# Patient Record
Sex: FEMALE | Race: BLACK OR AFRICAN AMERICAN | NOT HISPANIC OR LATINO | Employment: FULL TIME | URBAN - METROPOLITAN AREA
[De-identification: names, ages, dates, MRNs, and addresses within clinical notes are randomized per-mention and may not be internally consistent; named-entity substitution may affect disease eponyms.]

---

## 2017-02-20 ENCOUNTER — LAB VISIT (OUTPATIENT)
Dept: LAB | Facility: HOSPITAL | Age: 47
End: 2017-02-20
Attending: INTERNAL MEDICINE
Payer: COMMERCIAL

## 2017-02-20 DIAGNOSIS — Z29.9 PREVENTIVE MEASURE: ICD-10-CM

## 2017-02-20 LAB
25(OH)D3+25(OH)D2 SERPL-MCNC: 29 NG/ML
ALBUMIN SERPL BCP-MCNC: 3.7 G/DL
ALP SERPL-CCNC: 39 U/L
ALT SERPL W/O P-5'-P-CCNC: 5 U/L
ANION GAP SERPL CALC-SCNC: 10 MMOL/L
AST SERPL-CCNC: 15 U/L
BASOPHILS # BLD AUTO: 0.04 K/UL
BASOPHILS NFR BLD: 0.7 %
BILIRUB SERPL-MCNC: 1.6 MG/DL
BUN SERPL-MCNC: 11 MG/DL
CALCIUM SERPL-MCNC: 9 MG/DL
CHLORIDE SERPL-SCNC: 108 MMOL/L
CHOLEST/HDLC SERPL: 3.3 {RATIO}
CO2 SERPL-SCNC: 22 MMOL/L
COMPLEXED PSA SERPL-MCNC: 0.02 NG/ML
CREAT SERPL-MCNC: 0.8 MG/DL
DIFFERENTIAL METHOD: ABNORMAL
EOSINOPHIL # BLD AUTO: 0.1 K/UL
EOSINOPHIL NFR BLD: 1.3 %
ERYTHROCYTE [DISTWIDTH] IN BLOOD BY AUTOMATED COUNT: 14.8 %
EST. GFR  (AFRICAN AMERICAN): >60 ML/MIN/1.73 M^2
EST. GFR  (NON AFRICAN AMERICAN): >60 ML/MIN/1.73 M^2
FERRITIN SERPL-MCNC: 8 NG/ML
GLUCOSE SERPL-MCNC: 79 MG/DL
HCT VFR BLD AUTO: 36 %
HDL/CHOLESTEROL RATIO: 30.8 %
HDLC SERPL-MCNC: 208 MG/DL
HDLC SERPL-MCNC: 64 MG/DL
HGB BLD-MCNC: 11.9 G/DL
IRON SERPL-MCNC: 66 UG/DL
LDLC SERPL CALC-MCNC: 134.8 MG/DL
LYMPHOCYTES # BLD AUTO: 1.4 K/UL
LYMPHOCYTES NFR BLD: 26.3 %
MCH RBC QN AUTO: 27 PG
MCHC RBC AUTO-ENTMCNC: 33.1 %
MCV RBC AUTO: 82 FL
MONOCYTES # BLD AUTO: 0.4 K/UL
MONOCYTES NFR BLD: 7.3 %
NEUTROPHILS # BLD AUTO: 3.5 K/UL
NEUTROPHILS NFR BLD: 64.2 %
NONHDLC SERPL-MCNC: 144 MG/DL
PLATELET # BLD AUTO: 302 K/UL
PMV BLD AUTO: 10.7 FL
POTASSIUM SERPL-SCNC: 3.6 MMOL/L
PROT SERPL-MCNC: 6.9 G/DL
RBC # BLD AUTO: 4.41 M/UL
SATURATED IRON: 14 %
SODIUM SERPL-SCNC: 140 MMOL/L
TOTAL IRON BINDING CAPACITY: 459 UG/DL
TRANSFERRIN SERPL-MCNC: 310 MG/DL
TRIGL SERPL-MCNC: 46 MG/DL
TSH SERPL DL<=0.005 MIU/L-ACNC: 1.06 UIU/ML
WBC # BLD AUTO: 5.37 K/UL

## 2017-02-20 PROCEDURE — 36415 COLL VENOUS BLD VENIPUNCTURE: CPT | Mod: PO

## 2017-02-20 PROCEDURE — 84443 ASSAY THYROID STIM HORMONE: CPT

## 2017-02-20 PROCEDURE — 84153 ASSAY OF PSA TOTAL: CPT

## 2017-02-20 PROCEDURE — 83540 ASSAY OF IRON: CPT

## 2017-02-20 PROCEDURE — 82728 ASSAY OF FERRITIN: CPT

## 2017-02-20 PROCEDURE — 80053 COMPREHEN METABOLIC PANEL: CPT

## 2017-02-20 PROCEDURE — 80061 LIPID PANEL: CPT

## 2017-02-20 PROCEDURE — 85025 COMPLETE CBC W/AUTO DIFF WBC: CPT

## 2017-02-20 PROCEDURE — 82306 VITAMIN D 25 HYDROXY: CPT

## 2017-02-28 ENCOUNTER — OFFICE VISIT (OUTPATIENT)
Dept: INTERNAL MEDICINE | Facility: CLINIC | Age: 47
End: 2017-02-28
Payer: COMMERCIAL

## 2017-02-28 VITALS
DIASTOLIC BLOOD PRESSURE: 76 MMHG | WEIGHT: 111.56 LBS | HEART RATE: 75 BPM | HEIGHT: 65 IN | TEMPERATURE: 97 F | OXYGEN SATURATION: 100 % | BODY MASS INDEX: 18.59 KG/M2 | SYSTOLIC BLOOD PRESSURE: 106 MMHG

## 2017-02-28 DIAGNOSIS — K29.90 GASTRITIS AND DUODENITIS: ICD-10-CM

## 2017-02-28 DIAGNOSIS — E55.9 VITAMIN D DEFICIENCY DISEASE: Primary | ICD-10-CM

## 2017-02-28 DIAGNOSIS — G43.109 MIGRAINE WITH AURA AND WITHOUT STATUS MIGRAINOSUS, NOT INTRACTABLE: ICD-10-CM

## 2017-02-28 DIAGNOSIS — D50.9 IRON DEFICIENCY ANEMIA, UNSPECIFIED IRON DEFICIENCY ANEMIA TYPE: ICD-10-CM

## 2017-02-28 DIAGNOSIS — Z80.8 FH: THYROID CANCER: ICD-10-CM

## 2017-02-28 DIAGNOSIS — Z00.00 ENCOUNTER FOR PREVENTIVE HEALTH EXAMINATION: ICD-10-CM

## 2017-02-28 DIAGNOSIS — R07.89 OTHER CHEST PAIN: ICD-10-CM

## 2017-02-28 PROCEDURE — 99396 PREV VISIT EST AGE 40-64: CPT | Mod: S$GLB,,, | Performed by: INTERNAL MEDICINE

## 2017-02-28 PROCEDURE — 99999 PR PBB SHADOW E&M-EST. PATIENT-LVL III: CPT | Mod: PBBFAC,,, | Performed by: INTERNAL MEDICINE

## 2017-02-28 RX ORDER — OMEPRAZOLE 40 MG/1
40 CAPSULE, DELAYED RELEASE ORAL DAILY
Qty: 90 CAPSULE | Refills: 3 | Status: SHIPPED | OUTPATIENT
Start: 2017-02-28 | End: 2019-05-21 | Stop reason: SDUPTHER

## 2017-02-28 RX ORDER — AMITRIPTYLINE HYDROCHLORIDE 10 MG/1
10 TABLET, FILM COATED ORAL NIGHTLY PRN
Qty: 90 TABLET | Refills: 3 | Status: SHIPPED | OUTPATIENT
Start: 2017-02-28 | End: 2019-11-19 | Stop reason: SDUPTHER

## 2017-02-28 RX ORDER — ERGOCALCIFEROL 1.25 MG/1
50000 CAPSULE ORAL
Qty: 12 CAPSULE | Refills: 3 | Status: SHIPPED | OUTPATIENT
Start: 2017-02-28 | End: 2018-03-23

## 2017-02-28 NOTE — PROGRESS NOTES
"Subjective:       Patient ID: Alexandra Rivas is a 46 y.o. female.    Chief Complaint: Annual Exam    HPI Comments: Here for f/u medical problems and preventive exam.  Menstrual pain, advil helps.  No f/c/sw/cough.  Walking some for exercise.  No cp/sob/palp.  BMs normal.  Urine normal.  Taking vit D weekly.  Reflux good with Rx.    HM: 8/10 TDaP, 2/16 MMG/ Gyn Dr. Venegas, 10/16 thyroid u/s neg to be repeated yearly.        Review of Systems   Constitutional: Negative for appetite change, chills, diaphoresis and fever.   HENT: Negative for congestion, ear pain, rhinorrhea, sinus pressure and sore throat.    Respiratory: Negative for cough, chest tightness and shortness of breath.    Cardiovascular: Negative for chest pain and palpitations.   Gastrointestinal: Negative for blood in stool, constipation, diarrhea, nausea and vomiting.   Genitourinary: Negative for dysuria, frequency, hematuria, menstrual problem, urgency and vaginal discharge.   Musculoskeletal: Negative for arthralgias.   Skin: Negative for rash.   Neurological: Negative for dizziness and headaches.   Psychiatric/Behavioral: Negative for sleep disturbance. The patient is not nervous/anxious.        Objective:   /76 (BP Location: Right arm, Patient Position: Sitting, BP Method: Manual)  Pulse 75  Temp 96.9 °F (36.1 °C) (Tympanic)   Ht 5' 5" (1.651 m)  Wt 50.6 kg (111 lb 8.8 oz)  LMP 02/21/2017  SpO2 100%  BMI 18.56 kg/m2    Physical Exam   Constitutional: She is oriented to person, place, and time. She appears well-developed and well-nourished.   HENT:   Right Ear: External ear normal. Tympanic membrane is not injected.   Left Ear: External ear normal. Tympanic membrane is not injected.   Mouth/Throat: Oropharynx is clear and moist.   Eyes: Conjunctivae are normal.   Neck: Normal range of motion. Neck supple. No thyromegaly present.   Cardiovascular: Normal rate, regular rhythm and intact distal pulses.  Exam reveals no gallop and " no friction rub.    No murmur heard.  Pulmonary/Chest: Effort normal and breath sounds normal. She has no wheezes. She has no rales.   Abdominal: Soft. Bowel sounds are normal. She exhibits no mass. There is no tenderness.   Musculoskeletal: She exhibits no edema.   Lymphadenopathy:     She has no cervical adenopathy.   Neurological: She is alert and oriented to person, place, and time.   Skin: Skin is warm. No rash noted.   Psychiatric: She has a normal mood and affect.       Results for orders placed or performed in visit on 02/20/17   Comprehensive metabolic panel   Result Value Ref Range    Sodium 140 136 - 145 mmol/L    Potassium 3.6 3.5 - 5.1 mmol/L    Chloride 108 95 - 110 mmol/L    CO2 22 (L) 23 - 29 mmol/L    Glucose 79 70 - 110 mg/dL    BUN, Bld 11 6 - 20 mg/dL    Creatinine 0.8 0.5 - 1.4 mg/dL    Calcium 9.0 8.7 - 10.5 mg/dL    Total Protein 6.9 6.0 - 8.4 g/dL    Albumin 3.7 3.5 - 5.2 g/dL    Total Bilirubin 1.6 (H) 0.1 - 1.0 mg/dL    Alkaline Phosphatase 39 (L) 55 - 135 U/L    AST 15 10 - 40 U/L    ALT 5 (L) 10 - 44 U/L    Anion Gap 10 8 - 16 mmol/L    eGFR if African American >60.0 >60 mL/min/1.73 m^2    eGFR if non African American >60.0 >60 mL/min/1.73 m^2   Lipid panel   Result Value Ref Range    Cholesterol 208 (H) 120 - 199 mg/dL    Triglycerides 46 30 - 150 mg/dL    HDL 64 40 - 75 mg/dL    LDL Cholesterol 134.8 63.0 - 159.0 mg/dL    HDL/Chol Ratio 30.8 20.0 - 50.0 %    Total Cholesterol/HDL Ratio 3.3 2.0 - 5.0    Non-HDL Cholesterol 144 mg/dL   PSA, Screening   Result Value Ref Range    PSA, SCREEN 0.02 Not established ng/mL   CBC auto differential   Result Value Ref Range    WBC 5.37 3.90 - 12.70 K/uL    RBC 4.41 4.00 - 5.40 M/uL    Hemoglobin 11.9 (L) 12.0 - 16.0 g/dL    Hematocrit 36.0 (L) 37.0 - 48.5 %    MCV 82 82 - 98 fL    MCH 27.0 27.0 - 31.0 pg    MCHC 33.1 32.0 - 36.0 %    RDW 14.8 (H) 11.5 - 14.5 %    Platelets 302 150 - 350 K/uL    MPV 10.7 9.2 - 12.9 fL    Gran # 3.5 1.8 - 7.7 K/uL     Lymph # 1.4 1.0 - 4.8 K/uL    Mono # 0.4 0.3 - 1.0 K/uL    Eos # 0.1 0.0 - 0.5 K/uL    Baso # 0.04 0.00 - 0.20 K/uL    Gran% 64.2 38.0 - 73.0 %    Lymph% 26.3 18.0 - 48.0 %    Mono% 7.3 4.0 - 15.0 %    Eosinophil% 1.3 0.0 - 8.0 %    Basophil% 0.7 0.0 - 1.9 %    Differential Method Automated    TSH   Result Value Ref Range    TSH 1.061 0.400 - 4.000 uIU/mL   Vitamin D   Result Value Ref Range    Vit D, 25-Hydroxy 29 (L) 30 - 96 ng/mL   Iron and TIBC   Result Value Ref Range    Iron 66 30 - 160 ug/dL    Transferrin 310 200 - 375 mg/dL    TIBC 459 (H) 250 - 450 ug/dL    Saturated Iron 14 (L) 20 - 50 %   Ferritin   Result Value Ref Range    Ferritin 8 (L) 20.0 - 300.0 ng/mL       Assessment:       1. Vitamin D deficiency disease    2. Migraine with aura and without status migrainosus, not intractable    3. FH: thyroid cancer    4. Gastritis and duodenitis    5. Encounter for preventive health examination    6. Other chest pain    7. Iron deficiency anemia, unspecified iron deficiency anemia type        Plan:       Alexandra was seen today for annual exam.    Diagnoses and all orders for this visit:    Vitamin D deficiency disease  -     ergocalciferol (ERGOCALCIFEROL) 50,000 unit Cap; Take 1 capsule (50,000 Units total) by mouth every 7 days.    Migraine with aura and without status migrainosus, not intractable  -     amitriptyline (ELAVIL) 10 MG tablet; Take 1 tablet (10 mg total) by mouth nightly as needed for Insomnia.    FH: thyroid cancer- u/s yearly.    Gastritis and duodenitis- stable on PPI.    Encounter for preventive health examination- utd.  RTC 6mo.    Iron def anemia due to menorrhagia- start otc fergon.  Recheck 6mo.

## 2017-02-28 NOTE — MR AVS SNAPSHOT
[facility]  [address] [place] [address]  Phone: [phone]  Fax: [phone]                  Alexandra Rivas   2017 7:20 AM   Office Visit    Description:  Female : 1970   Provider:  Teetee Moran MD   Department:  [facility] - Internal [facility]           Reason for Visit     Annual Exam           Diagnoses this Visit        Comments    Vitamin D deficiency disease    -  Primary     Migraine with aura and without status migrainosus, not intractable         FH: thyroid cancer         Gastritis and duodenitis         Encounter for preventive health examination         Other chest pain         Iron deficiency anemia, unspecified iron deficiency anemia type                To Do List           Future Appointments        Provider Department Dept Phone    3/22/2017 9:00 AM THALIA IBANEZ-SCR [facility] [phone]    3/29/2017 7:45 AM Jed Venegas MD [facility]/ -[phone]    2017 9:45 AM LABORAT[facility], [facility] [phone]    2017 7:40 AM Teetee Moran MD [facility] [phone]      Goals (5 Years of Data)     None      Follow-Up and Disposition     Return in about 6 months (around 2017).       These Medications        Disp Refills Start End    amitriptyline (ELAVIL) 10 MG tablet 90 tablet 3 2017     Take 1 tablet (10 mg total) by mouth nightly as needed for Insomnia. - Oral    Pharmacy: [facility] [address] [place] [address] Ph #: [phone]       ergocalciferol (ERGOCALCIFEROL) 50,000 unit Cap 12 capsule 3 2017     Take 1 capsule (50,000 Units total) by mouth every 7 days. - Oral    Pharmacy: [address] Ph #: [phone]       omeprazole (PRILOSEC) 40 MG capsule 90 capsule 3 2017     Take 1 capsule (40 mg total) by mouth once daily. - Oral    Pharmacy: [address]  [address] [place] [address] #: [phone]         [facility]     [facility] - 24/7 Assistance  Registered nurses in the [facility] provide clinical advisement, health education, appointment booking, and other advisory services.  Call for this free service at [phone].             Medications           Message regarding Medications     Verify the changes and/or additions to your medication regime listed below are the same as discussed with your clinician today.  If any of these changes or additions are incorrect, please notify your healthcare provider.        CHANGE how you are taking these medications     Start Taking Instead of    ergocalciferol (ERGOCALCIFEROL) 50,000 unit Cap ergocalciferol (ERGOCALCIFEROL) 50,000 unit Cap    Dosage:  Take 1 capsule (50,000 Units total) by mouth every 7 days. Dosage:  Take 1 capsule (50,000 Units total) by mouth every 7 days. TAKE 1 CAPSULE BY MOUTH TWICE A WEEK    Reason for Change:  Reorder            Verify that the below list of medications is an accurate representation of the medications you are currently taking.  If none reported, the list may be blank. If incorrect, please contact your healthcare provider. Carry this list with you in case of emergency.           Current Medications     amitriptyline (ELAVIL) 10 MG tablet Take 1 tablet (10 mg total) by mouth nightly as needed for Insomnia.    clobetasol (TEMOVATE) 0.05 % external solution Apply to affected areas of scalp 1-2 times daily as needed for itch    ergocalciferol (ERGOCALCIFEROL) 50,000 unit Cap Take 1 capsule (50,000 Units total) by mouth every 7 days.    fluticasone (FLONASE) 50 mcg/actuation nasal spray 2 sprays by Each Nare route once daily.    ketoconazole (NIZORAL) 2 % shampoo Wash hair with medicated shampoo at least 1x/week - let sit on scalp at least 5 minutes prior to rinsing    omeprazole (PRILOSEC) 40 MG capsule Take 1 capsule (40 mg total) by mouth once daily.            Clinical Reference Information           Your Vitals Were     BP                   106/76 (BP Location: Right arm, Patient Position: Sitting, BP Method: Manual)           Blood Pressure          Most Recent Value    BP  106/76      Allergies as of 2/28/2017     Oxycodone-acetaminophen      Immunizations Administered on Date of Encounter - 2/28/2017     None      Orders Placed During Today's Visit      Normal Orders This Visit    Ambulatory consult to Gynecology     Future Labs/Procedures Expected by Expires    CBC auto differential  2/28/2017 2/28/2018    Ferritin  2/28/2017 8/31/2017    Mammo Digital Screening Bilat with CAD  2/28/2017 2/28/2018    Vitamin D  As directed 2/28/2018      Language Assistance Services     ATTENTION: Language assistance services are available, free of charge. Please call [phone].      ATENCIÓN: Si silvianola vida, tiene a good disposición servicios gratuitos de asistencia lingüística. Llame al [phone].     CHÚ Ý: N?u b?n nói Ti?ng Vi?t, có các d?ch v? h? tr? ngôn ng? mi?n phí dành cho b?n. G?i s? [phone].         [facility] complies with applicable Federal civil rights laws and does not discriminate on the basis of race, color, national origin, age, disability, or sex.

## 2017-03-22 ENCOUNTER — HOSPITAL ENCOUNTER (OUTPATIENT)
Dept: RADIOLOGY | Facility: HOSPITAL | Age: 47
Discharge: HOME | End: 2017-03-22
Attending: INTERNAL MEDICINE
Payer: COMMERCIAL

## 2017-03-22 DIAGNOSIS — Z00.00 ENCOUNTER FOR PREVENTIVE HEALTH EXAMINATION: ICD-10-CM

## 2017-03-22 PROCEDURE — 77067 SCR MAMMO BI INCL CAD: CPT | Mod: TC

## 2017-03-22 PROCEDURE — 77063 BREAST TOMOSYNTHESIS BI: CPT | Mod: 26,,, | Performed by: RADIOLOGY

## 2017-03-22 PROCEDURE — 77067 SCR MAMMO BI INCL CAD: CPT | Mod: 26,,, | Performed by: RADIOLOGY

## 2017-07-06 ENCOUNTER — OFFICE VISIT (OUTPATIENT)
Dept: INTERNAL MEDICINE | Facility: CLINIC | Age: 47
End: 2017-07-06
Payer: COMMERCIAL

## 2017-07-06 VITALS
HEART RATE: 62 BPM | HEIGHT: 65 IN | OXYGEN SATURATION: 100 % | SYSTOLIC BLOOD PRESSURE: 120 MMHG | DIASTOLIC BLOOD PRESSURE: 80 MMHG | TEMPERATURE: 97 F | BODY MASS INDEX: 19.36 KG/M2 | WEIGHT: 116.19 LBS

## 2017-07-06 DIAGNOSIS — E55.9 VITAMIN D DEFICIENCY DISEASE: ICD-10-CM

## 2017-07-06 DIAGNOSIS — J01.00 ACUTE NON-RECURRENT MAXILLARY SINUSITIS: ICD-10-CM

## 2017-07-06 DIAGNOSIS — D50.9 IRON DEFICIENCY ANEMIA, UNSPECIFIED IRON DEFICIENCY ANEMIA TYPE: Primary | ICD-10-CM

## 2017-07-06 PROCEDURE — 99999 PR PBB SHADOW E&M-EST. PATIENT-LVL III: CPT | Mod: PBBFAC,,, | Performed by: FAMILY MEDICINE

## 2017-07-06 PROCEDURE — 96372 THER/PROPH/DIAG INJ SC/IM: CPT | Mod: S$GLB,,, | Performed by: FAMILY MEDICINE

## 2017-07-06 PROCEDURE — 99214 OFFICE O/P EST MOD 30 MIN: CPT | Mod: S$GLB,,, | Performed by: FAMILY MEDICINE

## 2017-07-06 RX ORDER — BETAMETHASONE SODIUM PHOSPHATE AND BETAMETHASONE ACETATE 3; 3 MG/ML; MG/ML
6 INJECTION, SUSPENSION INTRA-ARTICULAR; INTRALESIONAL; INTRAMUSCULAR; SOFT TISSUE
Status: COMPLETED | OUTPATIENT
Start: 2017-07-06 | End: 2017-07-06

## 2017-07-06 RX ORDER — AMOXICILLIN 500 MG/1
1000 CAPSULE ORAL EVERY 12 HOURS
Qty: 56 CAPSULE | Refills: 0 | Status: SHIPPED | OUTPATIENT
Start: 2017-07-06 | End: 2017-07-13

## 2017-07-06 RX ORDER — PREDNISONE 5 MG/1
TABLET ORAL
Qty: 30 TABLET | Refills: 0 | Status: SHIPPED | OUTPATIENT
Start: 2017-07-06 | End: 2017-08-01

## 2017-07-06 RX ADMIN — BETAMETHASONE SODIUM PHOSPHATE AND BETAMETHASONE ACETATE 6 MG: 3; 3 INJECTION, SUSPENSION INTRA-ARTICULAR; INTRALESIONAL; INTRAMUSCULAR; SOFT TISSUE at 08:07

## 2017-07-06 NOTE — PROGRESS NOTES
NOTE: Documentation entered by me for this encounter was done in part using speech-recognition technology. Although I have made an effort to ensure accuracy, malapropisms may exist and should be interpreted in context.  -SHAWNA Dixon MD    PATIENT ID: Alexandra Rivas is a 46 y.o. female.    CHIEF COMPLAINT  Headache      HISTORY OF PRESENT ILLNESS  NOTE: The following condition(s) were addressed during this encounter. The listed order is one of convenience and does not necessarily reflect the chronology of the appointment, nor the relative importance of a condition. It is possible that additional description or status details about condition(s) may be found elsewhere in the documentation for today's encounter.    Problem List Items Addressed This Visit     Vitamin D deficiency disease    Iron deficiency anemia - Primary    Acute non-recurrent maxillary sinusitis    Current Assessment & Plan     This problem is NEW TO ME. Based on the report of the patient and information available to me at present, this condition does not require further work-up at this point. We will re-assess if the condition worsens or fails to improve as expected.          Relevant Medications    betamethasone acetate-betamethasone sodium phosphate injection 6 mg (Completed)    amoxicillin (AMOXIL) 500 MG capsule    predniSONE (DELTASONE) 5 MG tablet      Other Visit Diagnoses    None.       PROBLEM/CONDITION: NEW PROBLEM is headache. QUALITY described as an aching pressure. LOCATION is in the distribution of the maxillary and frontal sinuses predominantly. ASSOCIATED SYMPTOMS include a sense of dizziness when she turns her head quickly, but not vertigo. SEVERITY of symptoms is MODERATE. ONSET of symptoms was almost a month ago. She says that a couple of weeks ago she went to an urgent care clinic and received a cortisone shot and this temporarily ALLEVIATED her symptoms, but they have occurred. She says that in 2012 she had a  very similar illness, for which her primary care physician, Dr. Moran, ordered CT head, which revealed maxillary sinusitis. She says that she was treated with antibiotics and corticosteroids and the problem resolved. We discussed risks and benefits of treatment options. She agreed on empiric therapy, with consideration of additional diagnostic testing if this failed to provide satisfactory resolution of symptoms.     PROBLEM/CONDITION: She is noted to have iron deficiency anemia and vitamin D deficiency. She is not taking her iron supplement as directed due to side effects of constipation. She says that she is taking vitamin D as directed. We discussed ordering follow-up CBC, iron levels and vitamin D level, but it was determined that Dr. Moran has these tests scheduled for her next month, and she agreed to return to see Dr. Moran and reviewed those test results with her.    No other complaints or concerns reported.    REVIEW OF SYSTEMS  CONSTITUTIONAL: No fever reported.  CARDIOVASCULAR: No chest pain reported.  PULMONARY: No trouble breathing reported.     PHYSICAL EXAM  CONSTITUTIONAL: Vital signs (BP, P, T, RR, et al) noted. No apparent distress. Does not appear acutely ill or septic. Appears adequately hydrated.  EYES: Pupils equal and reactive. Extraocular movements intact. Sclerae anicteric. Lids and conjunctiva unremarkable.  ENT: External ENT grossly unremarkable. Ear canals clear. Tympanic membranes are unremarkable, intact and not inflamed or bulging. Hearing grossly intact. Nasal mucosa pink-blue and boggy. Maxillary sinuses mildly tender to percussion. Oropharynx moist with mild posterior cobblestoning but without lesion, inflammation or exudate. Posterior oropharynx is symmetric.   NECK: Neck supple without meningismus. Trachea midline. No significant cervical lymphadenopathy.  PULM: Lungs clear. Breathing unlabored.  HEART: Auscultation reveals regular rate and rhythm without  murmur, gallop or rub. No carotid bruit.  GI: Abdomen soft and nontender. Bowel sounds present.  DERM: Skin warm and moist with normal turgor.  NEURO: Strength is reasonably symmetric without gross focal motor deficits or gross deficits of cranial nerves III-XII.  PSYCH: Alert and oriented x 3. Mood is grossly euthymic. Affect appropriate. Judgment and insight not grossly compromised.  MSK: Grossly normal stance and gait.     HEALTH MAINTENANCE - DUE SOON OR OVERDUE  Health Maintenance Due   Topic Date Due    Pap Smear with HPV Cotest  08/06/2017        HEALTH MAINTENANCE - COMPLETED  Health Maintenance Topics with due status: Not Due       Topic Last Completion Date    TETANUS VACCINE 08/09/2010    Influenza Vaccine 10/24/2016    Lipid Panel 02/20/2017    Mammogram 03/22/2017        CURRENT MEDICATION LIST REVIEWED AND UPDATED  Current Outpatient Prescriptions   Medication Sig    amitriptyline (ELAVIL) 10 MG tablet Take 1 tablet (10 mg total) by mouth nightly as needed for Insomnia.    clobetasol (TEMOVATE) 0.05 % external solution Apply to affected areas of scalp 1-2 times daily as needed for itch    ergocalciferol (ERGOCALCIFEROL) 50,000 unit Cap Take 1 capsule (50,000 Units total) by mouth every 7 days.    fluticasone (FLONASE) 50 mcg/actuation nasal spray 2 sprays by Each Nare route once daily.    ketoconazole (NIZORAL) 2 % shampoo Wash hair with medicated shampoo at least 1x/week - let sit on scalp at least 5 minutes prior to rinsing    omeprazole (PRILOSEC) 40 MG capsule Take 1 capsule (40 mg total) by mouth once daily.    amoxicillin (AMOXIL) 500 MG capsule Take 2 capsules (1,000 mg total) by mouth every 12 (twelve) hours.    predniSONE (DELTASONE) 5 MG tablet Take 4 tablets by mouth once daily for 3 days, then 3 tabs daily for 3 days, then 2 tabs daily for 3 days, then 1 tab daily for 3 days     No current facility-administered medications for this visit.        ALLERGY LIST REVIEWED AND  "UPDATED  Review of patient's allergies indicates:   Allergen Reactions    Oxycodone-acetaminophen Itching       MEDICAL HISTORY REVIEWED AND UPDATED  She has a past medical history of Allergic rhinitis; FH: thyroid cancer; Gastritis and duodenitis; Headache, classical migraine; and Vitamin D deficiency disease.    SURGICAL HISTORY REVIEWED AND UPDATED  She has a past surgical history that includes Tubal ligation and Tonsillectomy.    SOCIAL HISTORY REVIEWED AND UPDATED  She reports that she has never smoked. She has never used smokeless tobacco. She reports that she drinks alcohol. She reports that she does not use drugs.    ASSESSMENT and PLAN  NOTE: Unless specified otherwise, the PLAN for a listed ASSESSMENT is to continue present treatment as prescribed. The planned follow-up and additional "Patient Instructions" may also be found in the After Visit Summary printed and provided to the patient.    Iron deficiency anemia, unspecified iron deficiency anemia type    Acute non-recurrent maxillary sinusitis  -     betamethasone acetate-betamethasone sodium phosphate injection 6 mg; Inject 1 mL (6 mg total) into the muscle one time.  -     amoxicillin (AMOXIL) 500 MG capsule; Take 2 capsules (1,000 mg total) by mouth every 12 (twelve) hours.  Dispense: 56 capsule; Refill: 0  -     predniSONE (DELTASONE) 5 MG tablet; Take 4 tablets by mouth once daily for 3 days, then 3 tabs daily for 3 days, then 2 tabs daily for 3 days, then 1 tab daily for 3 days  Dispense: 30 tablet; Refill: 0    Vitamin D deficiency disease        Medication List with Changes/Refills   New Medications    AMOXICILLIN (AMOXIL) 500 MG CAPSULE    Take 2 capsules (1,000 mg total) by mouth every 12 (twelve) hours.    PREDNISONE (DELTASONE) 5 MG TABLET    Take 4 tablets by mouth once daily for 3 days, then 3 tabs daily for 3 days, then 2 tabs daily for 3 days, then 1 tab daily for 3 days   Current Medications    AMITRIPTYLINE (ELAVIL) 10 MG TABLET    " Take 1 tablet (10 mg total) by mouth nightly as needed for Insomnia.    CLOBETASOL (TEMOVATE) 0.05 % EXTERNAL SOLUTION    Apply to affected areas of scalp 1-2 times daily as needed for itch    ERGOCALCIFEROL (ERGOCALCIFEROL) 50,000 UNIT CAP    Take 1 capsule (50,000 Units total) by mouth every 7 days.    FLUTICASONE (FLONASE) 50 MCG/ACTUATION NASAL SPRAY    2 sprays by Each Nare route once daily.    KETOCONAZOLE (NIZORAL) 2 % SHAMPOO    Wash hair with medicated shampoo at least 1x/week - let sit on scalp at least 5 minutes prior to rinsing    OMEPRAZOLE (PRILOSEC) 40 MG CAPSULE    Take 1 capsule (40 mg total) by mouth once daily.       Return for periodic management of chronic medical conditions.

## 2017-07-06 NOTE — ASSESSMENT & PLAN NOTE
This problem is NEW TO ME. Based on the report of the patient and information available to me at present, this condition does not require further work-up at this point. We will re-assess if the condition worsens or fails to improve as expected.

## 2017-07-13 ENCOUNTER — OFFICE VISIT (OUTPATIENT)
Dept: INTERNAL MEDICINE | Facility: CLINIC | Age: 47
End: 2017-07-13
Payer: COMMERCIAL

## 2017-07-13 VITALS
OXYGEN SATURATION: 98 % | HEART RATE: 88 BPM | TEMPERATURE: 98 F | DIASTOLIC BLOOD PRESSURE: 70 MMHG | SYSTOLIC BLOOD PRESSURE: 118 MMHG | BODY MASS INDEX: 19.32 KG/M2 | HEIGHT: 65 IN | WEIGHT: 115.94 LBS

## 2017-07-13 DIAGNOSIS — J01.10 ACUTE NON-RECURRENT FRONTAL SINUSITIS: Primary | ICD-10-CM

## 2017-07-13 PROCEDURE — 99999 PR PBB SHADOW E&M-EST. PATIENT-LVL III: CPT | Mod: PBBFAC,,, | Performed by: INTERNAL MEDICINE

## 2017-07-13 PROCEDURE — 99213 OFFICE O/P EST LOW 20 MIN: CPT | Mod: S$GLB,,, | Performed by: INTERNAL MEDICINE

## 2017-07-13 RX ORDER — LEVOFLOXACIN 500 MG/1
500 TABLET, FILM COATED ORAL DAILY
Qty: 14 TABLET | Refills: 0 | Status: SHIPPED | OUTPATIENT
Start: 2017-07-13 | End: 2017-07-23

## 2017-07-13 NOTE — PROGRESS NOTES
"Subjective:       Patient ID: Alexandra Rivas is a 46 y.o. female.    Chief Complaint: Follow-up    Here for follow up of medical problems.  Felt fine until May, went to  outside for headache (frontal sinus pressure), given injection and not much better.  Last week came here, Dr. Dixon started amoxil and prednisone.  Still feels very bad with ongoing head pressure, not ever easing up.  No f/c/n/v/blurry vision.  A little cough, dry.  4/12/17 left maxillary sinusitis on CT.    Updated/ annual due 1/18:  HM: 8/10 TDaP, 2/16 MMG/ Gyn Dr. Venegas, 10/16 thyroid u/s neg to be repeated yearly.          Review of Systems   Constitutional: Negative for chills, diaphoresis and fever.   Respiratory: Negative for cough and shortness of breath.    Cardiovascular: Negative for chest pain, palpitations and leg swelling.   Gastrointestinal: Negative for blood in stool, constipation, diarrhea, nausea and vomiting.   Genitourinary: Negative for dysuria, frequency and hematuria.   Psychiatric/Behavioral: The patient is not nervous/anxious.        Objective:   /70 (BP Location: Right arm, Patient Position: Sitting, BP Method: Manual)   Pulse 88   Temp 98.1 °F (36.7 °C) (Tympanic)   Ht 5' 5" (1.651 m)   Wt 52.6 kg (115 lb 15.4 oz)   LMP 05/29/2017 (Approximate) Comment: Irregular Period  SpO2 98%   BMI 19.30 kg/m²     Physical Exam   Constitutional: She is oriented to person, place, and time. She appears well-developed.   HENT:   Right Ear: External ear normal. Tympanic membrane is not injected.   Left Ear: External ear normal. Tympanic membrane is not injected.   Nose: Right sinus exhibits maxillary sinus tenderness and frontal sinus tenderness. Left sinus exhibits maxillary sinus tenderness and frontal sinus tenderness.   Mouth/Throat: Oropharynx is clear and moist.   Eyes: Conjunctivae are normal.   Neck: Neck supple. Carotid bruit is not present. No thyroid mass and no thyromegaly present. "   Cardiovascular: Normal rate, regular rhythm and intact distal pulses.  Exam reveals no gallop and no friction rub.    No murmur heard.  Pulmonary/Chest: Effort normal and breath sounds normal. She has no wheezes. She has no rales.   Abdominal: Soft. Bowel sounds are normal. She exhibits no mass. There is no hepatosplenomegaly. There is no tenderness.   Musculoskeletal: She exhibits no edema.   Lymphadenopathy:     She has no cervical adenopathy.   Neurological: She is alert and oriented to person, place, and time.   Psychiatric: She has a normal mood and affect.       Assessment:       1. Acute non-recurrent frontal sinusitis        Plan:       Alexandra was seen today for follow-up.    Diagnoses and all orders for this visit:    Acute non-recurrent frontal sinusitis- stop amoxil, cont pred, start levaquin 500 x 14d, CT when poss.  RTC 2wk.  -     CT Sinuses without Contrast; Future  -     levoFLOXacin (LEVAQUIN) 500 MG tablet; Take 1 tablet (500 mg total) by mouth once daily.

## 2017-07-14 ENCOUNTER — HOSPITAL ENCOUNTER (OUTPATIENT)
Dept: RADIOLOGY | Facility: HOSPITAL | Age: 47
Discharge: HOME | End: 2017-07-14
Attending: INTERNAL MEDICINE
Payer: COMMERCIAL

## 2017-07-14 DIAGNOSIS — J01.10 ACUTE NON-RECURRENT FRONTAL SINUSITIS: ICD-10-CM

## 2017-07-14 PROCEDURE — 70486 CT MAXILLOFACIAL W/O DYE: CPT | Mod: TC,PO

## 2017-07-14 PROCEDURE — 70486 CT MAXILLOFACIAL W/O DYE: CPT | Mod: 26,,, | Performed by: RADIOLOGY

## 2017-07-20 ENCOUNTER — PATIENT OUTREACH (OUTPATIENT)
Dept: ADMINISTRATIVE | Facility: HOSPITAL | Age: 47
End: 2017-07-20

## 2017-08-01 ENCOUNTER — OFFICE VISIT (OUTPATIENT)
Dept: INTERNAL MEDICINE | Facility: CLINIC | Age: 47
End: 2017-08-01
Payer: COMMERCIAL

## 2017-08-01 VITALS
TEMPERATURE: 95 F | HEIGHT: 65 IN | BODY MASS INDEX: 19.24 KG/M2 | OXYGEN SATURATION: 98 % | SYSTOLIC BLOOD PRESSURE: 106 MMHG | DIASTOLIC BLOOD PRESSURE: 74 MMHG | HEART RATE: 86 BPM | WEIGHT: 115.5 LBS

## 2017-08-01 DIAGNOSIS — J30.1 ACUTE SEASONAL ALLERGIC RHINITIS DUE TO POLLEN: ICD-10-CM

## 2017-08-01 DIAGNOSIS — G43.109 MIGRAINE WITH AURA AND WITHOUT STATUS MIGRAINOSUS, NOT INTRACTABLE: ICD-10-CM

## 2017-08-01 DIAGNOSIS — J01.00 ACUTE NON-RECURRENT MAXILLARY SINUSITIS: Primary | ICD-10-CM

## 2017-08-01 PROCEDURE — 99213 OFFICE O/P EST LOW 20 MIN: CPT | Mod: S$GLB,,, | Performed by: INTERNAL MEDICINE

## 2017-08-01 PROCEDURE — 99999 PR PBB SHADOW E&M-EST. PATIENT-LVL III: CPT | Mod: PBBFAC,,, | Performed by: INTERNAL MEDICINE

## 2017-08-01 RX ORDER — LORATADINE 10 MG/1
10 TABLET ORAL DAILY
Refills: 0 | COMMUNITY
Start: 2017-08-01 | End: 2020-07-08

## 2017-08-01 RX ORDER — FLUTICASONE PROPIONATE 50 MCG
2 SPRAY, SUSPENSION (ML) NASAL DAILY
Qty: 3 BOTTLE | Refills: 3 | Status: SHIPPED | OUTPATIENT
Start: 2017-08-01 | End: 2018-11-21 | Stop reason: SDUPTHER

## 2017-08-01 RX ORDER — MONTELUKAST SODIUM 10 MG/1
10 TABLET ORAL NIGHTLY
Qty: 30 TABLET | Refills: 6 | Status: SHIPPED | OUTPATIENT
Start: 2017-08-01 | End: 2017-08-31

## 2017-08-01 NOTE — PROGRESS NOTES
"Subjective:       Patient ID: Alexandra Rivas is a 46 y.o. female.    Chief Complaint: Follow-up    Here for follow up of medical problems.  Sinus pressure much improved now after prednisone and levaquin, but still needing sudafed frequently.  Had not been taking flonase daily.  No f/c/sw/cough.    Updated/ annual due 1/18:  HM: 8/10 TDaP, 2/16 MMG/ Gyn Dr. Venegas, 10/16 thyroid u/s neg to be repeated yearly.          Review of Systems   Constitutional: Negative for chills, diaphoresis and fever.   Respiratory: Negative for cough and shortness of breath.    Cardiovascular: Negative for chest pain, palpitations and leg swelling.   Gastrointestinal: Negative for blood in stool, constipation, diarrhea, nausea and vomiting.   Genitourinary: Negative for dysuria, frequency and hematuria.   Psychiatric/Behavioral: The patient is not nervous/anxious.        Objective:   /74 (BP Location: Right arm)   Pulse 86   Temp (!) 95.1 °F (35.1 °C) (Tympanic)   Ht 5' 5" (1.651 m)   Wt 52.4 kg (115 lb 8.3 oz)   LMP 05/29/2017 (Approximate)   SpO2 98%   BMI 19.22 kg/m²     Physical Exam   Constitutional: She is oriented to person, place, and time. She appears well-developed.   HENT:   Right Ear: External ear normal. Tympanic membrane is not injected.   Left Ear: External ear normal. Tympanic membrane is not injected.   Mouth/Throat: Oropharynx is clear and moist.   Eyes: Conjunctivae are normal.   Neck: Neck supple. Carotid bruit is not present. No thyroid mass and no thyromegaly present.   Cardiovascular: Normal rate, regular rhythm and intact distal pulses.  Exam reveals no gallop and no friction rub.    No murmur heard.  Pulmonary/Chest: Effort normal and breath sounds normal. She has no wheezes. She has no rales.   Abdominal: Soft. Bowel sounds are normal. She exhibits no mass. There is no hepatosplenomegaly. There is no tenderness.   Musculoskeletal: She exhibits no edema.   Lymphadenopathy:     She has no " cervical adenopathy.   Neurological: She is alert and oriented to person, place, and time.   Psychiatric: She has a normal mood and affect.       Assessment:       1. Acute non-recurrent maxillary sinusitis    2. Migraine with aura and without status migrainosus, not intractable    3. Acute seasonal allergic rhinitis due to pollen        Plan:       Alexadnra was seen today for follow-up.    Diagnoses and all orders for this visit:    Acute non-recurrent maxillary sinusitis    Migraine with aura and without status migrainosus, not intractable    Acute seasonal allergic rhinitis due to pollen- cont flonase, reassess 4wks as sched.  -     montelukast (SINGULAIR) 10 mg tablet; Take 1 tablet (10 mg total) by mouth every evening.  -     loratadine (CLARITIN) 10 mg tablet; Take 1 tablet (10 mg total) by mouth once daily.

## 2017-08-22 ENCOUNTER — LAB VISIT (OUTPATIENT)
Dept: LAB | Facility: HOSPITAL | Age: 47
End: 2017-08-22
Attending: INTERNAL MEDICINE
Payer: COMMERCIAL

## 2017-08-22 DIAGNOSIS — E55.9 VITAMIN D DEFICIENCY DISEASE: ICD-10-CM

## 2017-08-22 DIAGNOSIS — D50.9 IRON DEFICIENCY ANEMIA, UNSPECIFIED IRON DEFICIENCY ANEMIA TYPE: ICD-10-CM

## 2017-08-22 LAB
25(OH)D3+25(OH)D2 SERPL-MCNC: 37 NG/ML
BASOPHILS # BLD AUTO: 0.03 K/UL
BASOPHILS NFR BLD: 0.5 %
DIFFERENTIAL METHOD: ABNORMAL
EOSINOPHIL # BLD AUTO: 0.1 K/UL
EOSINOPHIL NFR BLD: 1.1 %
ERYTHROCYTE [DISTWIDTH] IN BLOOD BY AUTOMATED COUNT: 14.6 %
FERRITIN SERPL-MCNC: 9 NG/ML
HCT VFR BLD AUTO: 37.9 %
HGB BLD-MCNC: 12.4 G/DL
LYMPHOCYTES # BLD AUTO: 1.7 K/UL
LYMPHOCYTES NFR BLD: 29.9 %
MCH RBC QN AUTO: 26.8 PG
MCHC RBC AUTO-ENTMCNC: 32.7 G/DL
MCV RBC AUTO: 82 FL
MONOCYTES # BLD AUTO: 0.5 K/UL
MONOCYTES NFR BLD: 8 %
NEUTROPHILS # BLD AUTO: 3.4 K/UL
NEUTROPHILS NFR BLD: 60.3 %
PLATELET # BLD AUTO: 263 K/UL
PMV BLD AUTO: 10.5 FL
RBC # BLD AUTO: 4.62 M/UL
WBC # BLD AUTO: 5.61 K/UL

## 2017-08-22 PROCEDURE — 36415 COLL VENOUS BLD VENIPUNCTURE: CPT | Mod: PO

## 2017-08-22 PROCEDURE — 82728 ASSAY OF FERRITIN: CPT

## 2017-08-22 PROCEDURE — 82306 VITAMIN D 25 HYDROXY: CPT

## 2017-08-22 PROCEDURE — 85025 COMPLETE CBC W/AUTO DIFF WBC: CPT

## 2017-08-29 ENCOUNTER — OFFICE VISIT (OUTPATIENT)
Dept: INTERNAL MEDICINE | Facility: CLINIC | Age: 47
End: 2017-08-29
Payer: COMMERCIAL

## 2017-08-29 VITALS
HEIGHT: 65 IN | WEIGHT: 115.06 LBS | BODY MASS INDEX: 19.17 KG/M2 | HEART RATE: 84 BPM | OXYGEN SATURATION: 98 % | DIASTOLIC BLOOD PRESSURE: 78 MMHG | SYSTOLIC BLOOD PRESSURE: 116 MMHG | TEMPERATURE: 96 F

## 2017-08-29 DIAGNOSIS — D50.0 IRON DEFICIENCY ANEMIA DUE TO CHRONIC BLOOD LOSS: Primary | ICD-10-CM

## 2017-08-29 DIAGNOSIS — E55.9 VITAMIN D DEFICIENCY DISEASE: ICD-10-CM

## 2017-08-29 DIAGNOSIS — J30.1 CHRONIC SEASONAL ALLERGIC RHINITIS DUE TO POLLEN: ICD-10-CM

## 2017-08-29 DIAGNOSIS — Z80.8 FH: THYROID CANCER: ICD-10-CM

## 2017-08-29 PROCEDURE — 3008F BODY MASS INDEX DOCD: CPT | Mod: S$GLB,,, | Performed by: INTERNAL MEDICINE

## 2017-08-29 PROCEDURE — 99999 PR PBB SHADOW E&M-EST. PATIENT-LVL III: CPT | Mod: PBBFAC,,, | Performed by: INTERNAL MEDICINE

## 2017-08-29 PROCEDURE — 99213 OFFICE O/P EST LOW 20 MIN: CPT | Mod: S$GLB,,, | Performed by: INTERNAL MEDICINE

## 2017-08-29 NOTE — PROGRESS NOTES
"Subjective:       Patient ID: Alexandra Rivas is a 47 y.o. female.    Chief Complaint: Follow-up    Here for follow up of medical problems.  Sinus situation good now on singulair.  Doesn't tolerated oral iron.  Trying to get in diet.  No menses in 3months.  Energy level is low.  BMs normal, no black or blood.  Taking weekly vit D 50K.    Updated/ annual due 1/18:  HM: 8/10 TDaP, 2/17 MMG/ Gyn Dr. Venegas, 10/16 thyroid u/s neg to be repeated yearly.          Review of Systems   Constitutional: Negative for chills, diaphoresis and fever.   Respiratory: Negative for cough and shortness of breath.    Cardiovascular: Negative for chest pain, palpitations and leg swelling.   Gastrointestinal: Negative for blood in stool, constipation, diarrhea, nausea and vomiting.   Genitourinary: Negative for dysuria, frequency and hematuria.   Psychiatric/Behavioral: The patient is not nervous/anxious.        Objective:   /78 (BP Location: Right arm)   Pulse 84   Temp (!) 95.6 °F (35.3 °C) (Tympanic)   Ht 5' 5" (1.651 m)   Wt 52.2 kg (115 lb 1.3 oz)   SpO2 98%   BMI 19.15 kg/m²     Physical Exam   Constitutional: She is oriented to person, place, and time. She appears well-developed.   HENT:   Mouth/Throat: Oropharynx is clear and moist.   Neck: Neck supple. Carotid bruit is not present. No thyroid mass present.   Cardiovascular: Normal rate, regular rhythm and intact distal pulses.  Exam reveals no gallop and no friction rub.    No murmur heard.  Pulmonary/Chest: Effort normal and breath sounds normal. She has no wheezes. She has no rales.   Abdominal: Soft. Bowel sounds are normal. She exhibits no mass. There is no hepatosplenomegaly. There is no tenderness.   Musculoskeletal: She exhibits no edema.   Lymphadenopathy:     She has no cervical adenopathy.   Neurological: She is alert and oriented to person, place, and time.   Psychiatric: She has a normal mood and affect.       Results for orders placed or performed " in visit on 08/22/17   Vitamin D   Result Value Ref Range    Vit D, 25-Hydroxy 37 30 - 96 ng/mL   CBC auto differential   Result Value Ref Range    WBC 5.61 3.90 - 12.70 K/uL    RBC 4.62 4.00 - 5.40 M/uL    Hemoglobin 12.4 12.0 - 16.0 g/dL    Hematocrit 37.9 37.0 - 48.5 %    MCV 82 82 - 98 fL    MCH 26.8 (L) 27.0 - 31.0 pg    MCHC 32.7 32.0 - 36.0 g/dL    RDW 14.6 (H) 11.5 - 14.5 %    Platelets 263 150 - 350 K/uL    MPV 10.5 9.2 - 12.9 fL    Gran # 3.4 1.8 - 7.7 K/uL    Lymph # 1.7 1.0 - 4.8 K/uL    Mono # 0.5 0.3 - 1.0 K/uL    Eos # 0.1 0.0 - 0.5 K/uL    Baso # 0.03 0.00 - 0.20 K/uL    Gran% 60.3 38.0 - 73.0 %    Lymph% 29.9 18.0 - 48.0 %    Mono% 8.0 4.0 - 15.0 %    Eosinophil% 1.1 0.0 - 8.0 %    Basophil% 0.5 0.0 - 1.9 %    Differential Method Automated    Ferritin   Result Value Ref Range    Ferritin 9 (L) 20.0 - 300.0 ng/mL       Assessment:       1. Iron deficiency anemia due to chronic blood loss    2. Chronic seasonal allergic rhinitis due to pollen    3. FH: thyroid cancer    4. Vitamin D deficiency disease        Plan:       Alexandra was seen today for follow-up.    Diagnoses and all orders for this visit:    Iron deficiency anemia due to chronic blood loss  -     Ambulatory consult to Hematology / Oncology    Chronic seasonal allergic rhinitis due to pollen- cont singulair.    FH: thyroid cancer  -     US Soft Tissue Head Neck Thyroid; Future    Vitamin D deficiency disease- cont weekly.    RTC 3mo.

## 2017-09-11 ENCOUNTER — LAB VISIT (OUTPATIENT)
Dept: LAB | Facility: HOSPITAL | Age: 47
End: 2017-09-11
Attending: INTERNAL MEDICINE
Payer: COMMERCIAL

## 2017-09-11 ENCOUNTER — INITIAL CONSULT (OUTPATIENT)
Dept: HEMATOLOGY/ONCOLOGY | Facility: CLINIC | Age: 47
End: 2017-09-11
Payer: COMMERCIAL

## 2017-09-11 VITALS
HEIGHT: 65 IN | RESPIRATION RATE: 18 BRPM | BODY MASS INDEX: 19.28 KG/M2 | WEIGHT: 115.75 LBS | OXYGEN SATURATION: 99 % | TEMPERATURE: 98 F | SYSTOLIC BLOOD PRESSURE: 120 MMHG | HEART RATE: 88 BPM | DIASTOLIC BLOOD PRESSURE: 80 MMHG

## 2017-09-11 DIAGNOSIS — D50.9 MICROCYTIC ANEMIA: ICD-10-CM

## 2017-09-11 DIAGNOSIS — D50.0 IRON DEFICIENCY ANEMIA DUE TO CHRONIC BLOOD LOSS: ICD-10-CM

## 2017-09-11 DIAGNOSIS — D50.9 MICROCYTIC ANEMIA: Primary | ICD-10-CM

## 2017-09-11 LAB
ALBUMIN SERPL BCP-MCNC: 3.9 G/DL
ALP SERPL-CCNC: 45 U/L
ALT SERPL W/O P-5'-P-CCNC: 5 U/L
ANION GAP SERPL CALC-SCNC: 9 MMOL/L
AST SERPL-CCNC: 16 U/L
BASOPHILS # BLD AUTO: 0.04 K/UL
BASOPHILS NFR BLD: 0.7 %
BILIRUB SERPL-MCNC: 1 MG/DL
BUN SERPL-MCNC: 11 MG/DL
CALCIUM SERPL-MCNC: 9.5 MG/DL
CHLORIDE SERPL-SCNC: 103 MMOL/L
CO2 SERPL-SCNC: 28 MMOL/L
CREAT SERPL-MCNC: 0.8 MG/DL
CRP SERPL-MCNC: 0.6 MG/L
DIFFERENTIAL METHOD: NORMAL
EOSINOPHIL # BLD AUTO: 0.1 K/UL
EOSINOPHIL NFR BLD: 1 %
ERYTHROCYTE [DISTWIDTH] IN BLOOD BY AUTOMATED COUNT: 14.2 %
EST. GFR  (AFRICAN AMERICAN): >60 ML/MIN/1.73 M^2
EST. GFR  (NON AFRICAN AMERICAN): >60 ML/MIN/1.73 M^2
FERRITIN SERPL-MCNC: 7 NG/ML
GLUCOSE SERPL-MCNC: 99 MG/DL
HAPTOGLOB SERPL-MCNC: 119 MG/DL
HCT VFR BLD AUTO: 38 %
HGB BLD-MCNC: 12.2 G/DL
IRON SERPL-MCNC: 56 UG/DL
LDH SERPL L TO P-CCNC: 142 U/L
LYMPHOCYTES # BLD AUTO: 1.7 K/UL
LYMPHOCYTES NFR BLD: 28.5 %
MCH RBC QN AUTO: 27.2 PG
MCHC RBC AUTO-ENTMCNC: 32.1 G/DL
MCV RBC AUTO: 85 FL
MONOCYTES # BLD AUTO: 0.4 K/UL
MONOCYTES NFR BLD: 6.4 %
NEUTROPHILS # BLD AUTO: 3.7 K/UL
NEUTROPHILS NFR BLD: 63.4 %
PLATELET # BLD AUTO: 277 K/UL
PMV BLD AUTO: 10.2 FL
POTASSIUM SERPL-SCNC: 3.6 MMOL/L
PROT SERPL-MCNC: 7.2 G/DL
RBC # BLD AUTO: 4.49 M/UL
RETICS/RBC NFR AUTO: 0.9 %
SATURATED IRON: 12 %
SODIUM SERPL-SCNC: 140 MMOL/L
TOTAL IRON BINDING CAPACITY: 453 UG/DL
TRANSFERRIN SERPL-MCNC: 306 MG/DL
TSH SERPL DL<=0.005 MIU/L-ACNC: 0.79 UIU/ML
VIT B12 SERPL-MCNC: 299 PG/ML
WBC # BLD AUTO: 5.78 K/UL

## 2017-09-11 PROCEDURE — 3008F BODY MASS INDEX DOCD: CPT | Mod: S$GLB,,, | Performed by: INTERNAL MEDICINE

## 2017-09-11 PROCEDURE — 84443 ASSAY THYROID STIM HORMONE: CPT

## 2017-09-11 PROCEDURE — 82607 VITAMIN B-12: CPT

## 2017-09-11 PROCEDURE — 82728 ASSAY OF FERRITIN: CPT

## 2017-09-11 PROCEDURE — 99205 OFFICE O/P NEW HI 60 MIN: CPT | Mod: S$GLB,,, | Performed by: INTERNAL MEDICINE

## 2017-09-11 PROCEDURE — 80053 COMPREHEN METABOLIC PANEL: CPT | Mod: PO

## 2017-09-11 PROCEDURE — 36415 COLL VENOUS BLD VENIPUNCTURE: CPT | Mod: PO

## 2017-09-11 PROCEDURE — 86140 C-REACTIVE PROTEIN: CPT

## 2017-09-11 PROCEDURE — 85025 COMPLETE CBC W/AUTO DIFF WBC: CPT | Mod: PO

## 2017-09-11 PROCEDURE — 83540 ASSAY OF IRON: CPT

## 2017-09-11 PROCEDURE — 85045 AUTOMATED RETICULOCYTE COUNT: CPT

## 2017-09-11 PROCEDURE — 99999 PR PBB SHADOW E&M-EST. PATIENT-LVL III: CPT | Mod: PBBFAC,,, | Performed by: INTERNAL MEDICINE

## 2017-09-11 PROCEDURE — 83010 ASSAY OF HAPTOGLOBIN QUANT: CPT

## 2017-09-11 PROCEDURE — 83615 LACTATE (LD) (LDH) ENZYME: CPT | Mod: PO

## 2017-09-11 RX ORDER — HEPARIN 100 UNIT/ML
500 SYRINGE INTRAVENOUS
Status: CANCELLED | OUTPATIENT
Start: 2017-09-19

## 2017-09-11 RX ORDER — HEPARIN 100 UNIT/ML
500 SYRINGE INTRAVENOUS
Status: CANCELLED | OUTPATIENT
Start: 2017-09-11

## 2017-09-11 RX ORDER — SODIUM CHLORIDE 0.9 % (FLUSH) 0.9 %
10 SYRINGE (ML) INJECTION
Status: CANCELLED | OUTPATIENT
Start: 2017-09-11

## 2017-09-11 RX ORDER — SODIUM CHLORIDE 0.9 % (FLUSH) 0.9 %
10 SYRINGE (ML) INJECTION
Status: CANCELLED | OUTPATIENT
Start: 2017-09-19

## 2017-09-11 NOTE — PROGRESS NOTES
"Hematology/Oncology Office Note    Reason for referral:  Iron deficiency    CC:  Iron deficiency    Referred by:  Teetee Moran MD    Diagnosis:  Iron deficiency anemia with irregular menses    Treatment:  Intermittent oral iron supplementation with poor compliance due to significant GI side effects    History of present illness:  47-year-old premenopausal female with long-standing iron deficiency anemia who is responded poorly to oral iron supplementation in the past.  Her compliance with oral iron supplementation has been spotty due to significant GI side effects including nausea and constipation.  She currently has irregular menstrual cycles.  She denies melena, hematochezia, hematuria, or other forms of bleeding.      Past Medical History:   Diagnosis Date    Allergic rhinitis     FH: thyroid cancer     Gastritis and duodenitis 4/19/2016    Headache, classical migraine     occasional aura "every now and then"    Vitamin D deficiency disease          Social History:      Family History: family history includes Aneurysm in her father; Diabetes in her paternal grandmother; Hypertension in her mother and sister; Psoriasis in her maternal grandmother.        HPI  Review of Systems   Constitutional: Negative for activity change, appetite change, fatigue, fever and unexpected weight change.   HENT: Negative for congestion, dental problem, drooling, ear pain, facial swelling, mouth sores, nosebleeds, sore throat, trouble swallowing and voice change.    Eyes: Negative for itching.   Respiratory: Negative for apnea, cough, choking, chest tightness, shortness of breath and stridor.    Cardiovascular: Negative for chest pain, palpitations and leg swelling.   Gastrointestinal: Negative for abdominal distention, abdominal pain, anal bleeding, blood in stool, constipation, diarrhea, nausea and vomiting.   Genitourinary: Negative for difficulty urinating, flank pain, frequency, hematuria, pelvic pain and vaginal " "bleeding.   Musculoskeletal: Negative for arthralgias, back pain, gait problem, joint swelling, myalgias and neck pain.   Skin: Negative for color change, pallor, rash and wound.   Neurological: Negative for dizziness, tremors, facial asymmetry, speech difficulty, light-headedness and headaches.   Hematological: Negative for adenopathy. Does not bruise/bleed easily.   Psychiatric/Behavioral: Negative for agitation, behavioral problems, confusion, dysphoric mood and hallucinations. The patient is not nervous/anxious and is not hyperactive.        Objective:       Vitals:    09/11/17 1434   BP: 120/80   BP Location: Right arm   Patient Position: Sitting   BP Method: Large (Manual)   Pulse: 88   Resp: 18   Temp: 98.2 °F (36.8 °C)   TempSrc: Oral   SpO2: 99%   Weight: 52.5 kg (115 lb 11.9 oz)   Height: 5' 5" (1.651 m)     Physical Exam   Constitutional: She is oriented to person, place, and time. She appears well-developed and well-nourished. No distress.   Well groomed   HENT:   Head: Normocephalic and atraumatic.   Right Ear: Tympanic membrane and ear canal normal.   Left Ear: Tympanic membrane and ear canal normal.   Nose: Nose normal. Right sinus exhibits no maxillary sinus tenderness and no frontal sinus tenderness. Left sinus exhibits no maxillary sinus tenderness and no frontal sinus tenderness.   Mouth/Throat: Oropharynx is clear and moist and mucous membranes are normal. No oral lesions. Normal dentition. No oropharyngeal exudate.   Eyes: Conjunctivae, EOM and lids are normal. Pupils are equal, round, and reactive to light. Lids are everted and swept, no foreign bodies found. Right eye exhibits no discharge. Left eye exhibits no discharge. No scleral icterus.   Neck: Trachea normal and normal range of motion. Neck supple. No JVD present. No tracheal deviation present. No thyroid mass and no thyromegaly present.   No crepitus   Cardiovascular: Normal rate, regular rhythm, normal heart sounds, intact distal " pulses and normal pulses.  Exam reveals no gallop and no friction rub.    No murmur heard.  Pulmonary/Chest: Effort normal and breath sounds normal. No stridor. No respiratory distress. She has no wheezes. She has no rales. She exhibits no tenderness.   Abdominal: Soft. Normal appearance and bowel sounds are normal. She exhibits no distension and no mass. There is no hepatosplenomegaly. There is no tenderness. There is no rebound and no guarding.   Musculoskeletal: Normal range of motion. She exhibits no edema or tenderness.   Lymphadenopathy:     She has no cervical adenopathy.   Neurological: She is alert and oriented to person, place, and time. No cranial nerve deficit. She exhibits normal muscle tone. Coordination normal.   Skin: Skin is warm and dry. No rash noted. She is not diaphoretic. No cyanosis or erythema. No pallor. Nails show no clubbing.   Psychiatric: She has a normal mood and affect. Her behavior is normal. Judgment and thought content normal.   Vitals reviewed.      Lab Results   Component Value Date    WBC 5.78 09/11/2017    HGB 12.2 09/11/2017    HCT 38.0 09/11/2017    MCV 85 09/11/2017     09/11/2017     Lab Results   Component Value Date    CREATININE 0.8 09/11/2017    BUN 11 09/11/2017     09/11/2017    K 3.6 09/11/2017     09/11/2017    CO2 28 09/11/2017     Lab Results   Component Value Date    ALT 5 (L) 09/11/2017    AST 16 09/11/2017    ALKPHOS 45 (L) 09/11/2017    BILITOT 1.0 09/11/2017         Assessment:       47-year-old female with long-standing iron deficiency anemia presumed to be secondary to irregular menstrual cycles.  We will expand anemia workup as noted below and replete iron with Injectafer 750 mg IV ×2 doses.  Patient will follow-up in 1-2 weeks to begin IV iron and discuss results of workup.    Iron deficiency anemia:  --CBC, CMP, LDH, iron studies, B12, TSH, CRP    Irregular menstrual cycles likely perimenopausal:  --Follow-up with GYN

## 2017-09-11 NOTE — LETTER
September 11, 2017      Teetee Moran MD  [address] [facility] [place] [address]           [facility] - Hemotology Oncology  [address] [place] [address]  Phone: [phone]  Fax: [phone]          Patient: Alexandra Rivas   MR Number: [id number]   YOB: 1970   Date of Visit: 9/11/2017       Dear Dr. Teetee Moran:    Thank you for referring Alexandra Rivas to me for evaluation. Attached you will find relevant portions of my assessment and plan of care.    If you have questions, please do not hesitate to call me. I look forward to following Alexandra Rivas along with you.    Sincerely,    Babak Luque Jr., MD    Enclosure  CC:  No Recipients    If you would like to receive this communication electronically, please contact [email or web address] or [phone] to request more information on [employer] [facility] access.    For providers and/or their staff who would like to refer a patient to [facility], please contact us through our one-stop-shop provider referral line, [facility], at [phone].    If you feel you have received this communication in error or would no longer like to receive these types of communications, please e-mail [email or web address]

## 2017-09-18 ENCOUNTER — OFFICE VISIT (OUTPATIENT)
Dept: HEMATOLOGY/ONCOLOGY | Facility: CLINIC | Age: 47
End: 2017-09-18
Payer: COMMERCIAL

## 2017-09-18 ENCOUNTER — INFUSION (OUTPATIENT)
Dept: INFUSION THERAPY | Facility: HOSPITAL | Age: 47
End: 2017-09-18
Attending: INTERNAL MEDICINE
Payer: COMMERCIAL

## 2017-09-18 VITALS — DIASTOLIC BLOOD PRESSURE: 72 MMHG | HEART RATE: 73 BPM | SYSTOLIC BLOOD PRESSURE: 110 MMHG | RESPIRATION RATE: 18 BRPM

## 2017-09-18 VITALS
BODY MASS INDEX: 18.95 KG/M2 | TEMPERATURE: 98 F | HEIGHT: 65 IN | DIASTOLIC BLOOD PRESSURE: 80 MMHG | RESPIRATION RATE: 18 BRPM | SYSTOLIC BLOOD PRESSURE: 120 MMHG | OXYGEN SATURATION: 98 % | WEIGHT: 113.75 LBS | HEART RATE: 71 BPM

## 2017-09-18 DIAGNOSIS — D50.0 IRON DEFICIENCY ANEMIA DUE TO CHRONIC BLOOD LOSS: Primary | ICD-10-CM

## 2017-09-18 DIAGNOSIS — D50.9 IRON DEFICIENCY ANEMIA, UNSPECIFIED IRON DEFICIENCY ANEMIA TYPE: ICD-10-CM

## 2017-09-18 PROCEDURE — 96365 THER/PROPH/DIAG IV INF INIT: CPT | Mod: PO

## 2017-09-18 PROCEDURE — 3008F BODY MASS INDEX DOCD: CPT | Mod: S$GLB,,, | Performed by: INTERNAL MEDICINE

## 2017-09-18 PROCEDURE — 99214 OFFICE O/P EST MOD 30 MIN: CPT | Mod: S$GLB,,, | Performed by: INTERNAL MEDICINE

## 2017-09-18 PROCEDURE — 63600175 PHARM REV CODE 636 W HCPCS: Mod: PO | Performed by: INTERNAL MEDICINE

## 2017-09-18 PROCEDURE — 99999 PR PBB SHADOW E&M-EST. PATIENT-LVL III: CPT | Mod: PBBFAC,,, | Performed by: INTERNAL MEDICINE

## 2017-09-18 PROCEDURE — 25000003 PHARM REV CODE 250: Mod: PO | Performed by: INTERNAL MEDICINE

## 2017-09-18 RX ADMIN — FERRIC CARBOXYMALTOSE INJECTION 750 MG: 50 INJECTION, SOLUTION INTRAVENOUS at 01:09

## 2017-09-18 NOTE — PLAN OF CARE
Problem: Activity Intolerance (Adult)  Intervention: Promote Activity  Reviewed side effects of medication as well as s/s associated with anemia. Today is pt's first dose of Iron infusion.

## 2017-09-18 NOTE — PLAN OF CARE
"Problem: Patient Care Overview  Goal: Plan of Care Review  Outcome: Ongoing (interventions implemented as appropriate)  Pt states, " I feel tired today and I hope this iron infusion helps me"      "

## 2017-09-18 NOTE — PLAN OF CARE
Problem: Anemia (Adult)  Intervention: Facilitate Safe Activity  Reviewed side effects of Injectafer with pt, verbalized understanding.

## 2017-09-18 NOTE — PROGRESS NOTES
"Hematology/Oncology Office Note    Reason for referral:  Iron deficiency    CC:  Iron deficiency    Referred by:  No ref. provider found    Diagnosis:  Iron deficiency anemia with irregular menses    Treatment:  Intermittent oral iron supplementation with poor compliance due to significant GI side effects    History of present illness:  47-year-old premenopausal female with long-standing iron deficiency anemia who is responded poorly to oral iron supplementation in the past.  Her compliance with oral iron supplementation has been spotty due to significant GI side effects including nausea and constipation.  She currently has irregular menstrual cycles.  She denies melena, hematochezia, hematuria, or other forms of bleeding.    I have reviewed and updated the HPI, ROS, PMHx, Social Hx, Family Hx and treatment history.      Today's visit:  Patient reports fatigue and cold intolerance.  She presents today to review results of iron studies and begin IV iron replacement with Feraheme.      Past Medical History:   Diagnosis Date    Allergic rhinitis     FH: thyroid cancer     Gastritis and duodenitis 4/19/2016    Headache, classical migraine     occasional aura "every now and then"    Vitamin D deficiency disease          Social History:      Family History: family history includes Aneurysm in her father; Diabetes in her paternal grandmother; Hypertension in her mother and sister; Psoriasis in her maternal grandmother.        HPI    Review of Systems   Constitutional: Positive for fatigue. Negative for activity change, appetite change, fever and unexpected weight change.   HENT: Negative for congestion, dental problem, drooling, ear pain, facial swelling, mouth sores, nosebleeds and voice change.    Respiratory: Negative for apnea, cough, choking, chest tightness, shortness of breath and stridor.    Cardiovascular: Negative for chest pain, palpitations and leg swelling.   Gastrointestinal: Negative for abdominal " "distention, abdominal pain, blood in stool, constipation and vomiting.   Endocrine: Positive for cold intolerance.   Genitourinary: Negative for difficulty urinating, flank pain, frequency, hematuria and pelvic pain.   Musculoskeletal: Negative for arthralgias, back pain, gait problem, joint swelling, myalgias and neck pain.   Skin: Negative for color change, pallor, rash and wound.   Neurological: Negative for dizziness, tremors, seizures, facial asymmetry, speech difficulty, light-headedness and headaches.   Hematological: Negative for adenopathy. Does not bruise/bleed easily.   Psychiatric/Behavioral: Negative for agitation, behavioral problems, confusion, dysphoric mood and hallucinations. The patient is not nervous/anxious and is not hyperactive.        Objective:       Vitals:    09/18/17 1309   BP: 120/80   BP Location: Right arm   Patient Position: Sitting   BP Method: X-Large (Manual)   Pulse: 71   Resp: 18   Temp: 98 °F (36.7 °C)   TempSrc: Oral   SpO2: 98%   Weight: 51.6 kg (113 lb 12.1 oz)   Height: 5' 5" (1.651 m)     Physical Exam   Constitutional: She is oriented to person, place, and time. She appears well-developed and well-nourished. No distress.   Well groomed   HENT:   Head: Normocephalic and atraumatic.   Right Ear: Tympanic membrane and ear canal normal.   Left Ear: Tympanic membrane and ear canal normal.   Nose: Nose normal. Right sinus exhibits no maxillary sinus tenderness and no frontal sinus tenderness. Left sinus exhibits no maxillary sinus tenderness and no frontal sinus tenderness.   Mouth/Throat: Oropharynx is clear and moist and mucous membranes are normal. No oral lesions. Normal dentition. No oropharyngeal exudate.   Eyes: Conjunctivae, EOM and lids are normal. Pupils are equal, round, and reactive to light. Lids are everted and swept, no foreign bodies found. Right eye exhibits no discharge. Left eye exhibits no discharge. No scleral icterus.   Neck: Trachea normal and normal range " of motion. Neck supple. No JVD present. No tracheal deviation present. No thyroid mass and no thyromegaly present.   No crepitus   Cardiovascular: Normal rate, regular rhythm, normal heart sounds, intact distal pulses and normal pulses.  Exam reveals no gallop and no friction rub.    No murmur heard.  Pulmonary/Chest: Effort normal and breath sounds normal. No stridor. No respiratory distress. She has no wheezes. She has no rales. She exhibits no tenderness.   Abdominal: Soft. Normal appearance and bowel sounds are normal. She exhibits no distension and no mass. There is no hepatosplenomegaly. There is no tenderness. There is no rebound and no guarding.   Musculoskeletal: Normal range of motion. She exhibits no edema or tenderness.   Lymphadenopathy:     She has no cervical adenopathy.   Neurological: She is alert and oriented to person, place, and time. No cranial nerve deficit. She exhibits normal muscle tone. Coordination normal.   Skin: Skin is warm, dry and intact. Capillary refill takes less than 2 seconds. No rash noted. She is not diaphoretic. No cyanosis. No pallor.   Psychiatric: She has a normal mood and affect. Her behavior is normal. Judgment and thought content normal.   Vitals reviewed.      Lab Results   Component Value Date    WBC 5.78 09/11/2017    HGB 12.2 09/11/2017    HCT 38.0 09/11/2017    MCV 85 09/11/2017     09/11/2017     Lab Results   Component Value Date    CREATININE 0.8 09/11/2017    BUN 11 09/11/2017     09/11/2017    K 3.6 09/11/2017     09/11/2017    CO2 28 09/11/2017     Lab Results   Component Value Date    ALT 5 (L) 09/11/2017    AST 16 09/11/2017    ALKPHOS 45 (L) 09/11/2017    BILITOT 1.0 09/11/2017         Assessment:       47-year-old female with long-standing iron deficiency anemia presumed to be secondary to irregular menstrual cycles.  We will eplete iron with Injectafer 750 mg IV ×2 doses.  She will follow-up in 2-3 months with repeat CBC/iron  studies.    Iron deficiency anemia:  --Injectafer 750 mg IV ×2 doses  --Repeat CBC iron studies in 2-3 months    Irregular menstrual cycles likely perimenopausal:  --Follow-up with GYN

## 2017-09-18 NOTE — PATIENT INSTRUCTIONS
[facility]  [address]  [phone] phone     [phone] fax  Hours of Operation: Monday- Friday 8:00am- 5:00pm  After hours phone  [phone]  Hematology / Oncology Physicians on call      Dr. Lukasz Luque                        Please call with any concerns regarding your appointment today.

## 2017-09-25 ENCOUNTER — INFUSION (OUTPATIENT)
Dept: INFUSION THERAPY | Facility: HOSPITAL | Age: 47
End: 2017-09-25
Attending: INTERNAL MEDICINE
Payer: COMMERCIAL

## 2017-09-25 VITALS
HEART RATE: 66 BPM | BODY MASS INDEX: 18.95 KG/M2 | WEIGHT: 113.75 LBS | SYSTOLIC BLOOD PRESSURE: 113 MMHG | DIASTOLIC BLOOD PRESSURE: 79 MMHG | RESPIRATION RATE: 18 BRPM | OXYGEN SATURATION: 99 % | HEIGHT: 65 IN | TEMPERATURE: 98 F

## 2017-09-25 DIAGNOSIS — D50.0 IRON DEFICIENCY ANEMIA DUE TO CHRONIC BLOOD LOSS: Primary | ICD-10-CM

## 2017-09-25 DIAGNOSIS — D50.9 IRON DEFICIENCY ANEMIA, UNSPECIFIED IRON DEFICIENCY ANEMIA TYPE: ICD-10-CM

## 2017-09-25 PROCEDURE — 63600175 PHARM REV CODE 636 W HCPCS: Mod: PO | Performed by: INTERNAL MEDICINE

## 2017-09-25 PROCEDURE — 96365 THER/PROPH/DIAG IV INF INIT: CPT | Mod: PO

## 2017-09-25 PROCEDURE — 25000003 PHARM REV CODE 250: Mod: PO | Performed by: INTERNAL MEDICINE

## 2017-09-25 RX ADMIN — FERRIC CARBOXYMALTOSE INJECTION 750 MG: 50 INJECTION, SOLUTION INTRAVENOUS at 01:09

## 2017-09-25 RX ADMIN — SODIUM CHLORIDE: 9 INJECTION, SOLUTION INTRAVENOUS at 01:09

## 2017-09-25 NOTE — PATIENT INSTRUCTIONS
[facility]  [address]  [phone] phone     [phone] fax  Hours of Operation: Monday- Friday 8:00am- 5:00pm  After hours phone  [phone]  Hematology / Oncology Physicians on call      Dr. Lukasz Luque                        Please call with any concerns regarding your appointment today.

## 2017-10-02 ENCOUNTER — PATIENT MESSAGE (OUTPATIENT)
Dept: INTERNAL MEDICINE | Facility: CLINIC | Age: 47
End: 2017-10-02

## 2017-10-02 RX ORDER — NAPROXEN 500 MG/1
500 TABLET ORAL 2 TIMES DAILY PRN
Qty: 30 TABLET | Refills: 2 | Status: SHIPPED | OUTPATIENT
Start: 2017-10-02 | End: 2018-08-13

## 2017-11-13 ENCOUNTER — TELEPHONE (OUTPATIENT)
Dept: RADIOLOGY | Facility: HOSPITAL | Age: 47
End: 2017-11-13

## 2017-11-14 ENCOUNTER — HOSPITAL ENCOUNTER (OUTPATIENT)
Dept: RADIOLOGY | Facility: HOSPITAL | Age: 47
Discharge: HOME | End: 2017-11-14
Attending: INTERNAL MEDICINE
Payer: COMMERCIAL

## 2017-11-14 DIAGNOSIS — Z80.8 FH: THYROID CANCER: ICD-10-CM

## 2017-11-14 PROCEDURE — 76536 US EXAM OF HEAD AND NECK: CPT | Mod: 26,,, | Performed by: RADIOLOGY

## 2017-11-14 PROCEDURE — 76536 US EXAM OF HEAD AND NECK: CPT | Mod: TC,PO

## 2017-11-21 ENCOUNTER — OFFICE VISIT (OUTPATIENT)
Dept: HEMATOLOGY/ONCOLOGY | Facility: CLINIC | Age: 47
End: 2017-11-21
Payer: COMMERCIAL

## 2017-11-21 VITALS
TEMPERATURE: 98 F | DIASTOLIC BLOOD PRESSURE: 80 MMHG | HEART RATE: 62 BPM | BODY MASS INDEX: 19.07 KG/M2 | SYSTOLIC BLOOD PRESSURE: 120 MMHG | RESPIRATION RATE: 18 BRPM | HEIGHT: 65 IN | WEIGHT: 114.44 LBS | OXYGEN SATURATION: 100 %

## 2017-11-21 DIAGNOSIS — D50.9 MICROCYTIC ANEMIA: ICD-10-CM

## 2017-11-21 DIAGNOSIS — D50.0 IRON DEFICIENCY ANEMIA DUE TO CHRONIC BLOOD LOSS: Primary | ICD-10-CM

## 2017-11-21 PROCEDURE — 99214 OFFICE O/P EST MOD 30 MIN: CPT | Mod: S$GLB,,, | Performed by: INTERNAL MEDICINE

## 2017-11-21 PROCEDURE — 99999 PR PBB SHADOW E&M-EST. PATIENT-LVL III: CPT | Mod: PBBFAC,,, | Performed by: INTERNAL MEDICINE

## 2017-11-21 NOTE — PROGRESS NOTES
"Hematology/Oncology Office Note    Reason for referral:  Iron deficiency    CC:  Iron deficiency    Referred by:  No ref. provider found    Diagnosis:  Iron deficiency anemia with irregular menses    Treatment:  Intermittent oral iron supplementation with poor compliance due to significant GI side effects    History of present illness:  47-year-old premenopausal female with long-standing iron deficiency anemia who is responded poorly to oral iron supplementation in the past.  Her compliance with oral iron supplementation has been spotty due to significant GI side effects including nausea and constipation.  She currently has irregular menstrual cycles.  She denies melena, hematochezia, hematuria, or other forms of bleeding.    I have reviewed and updated the HPI, ROS, PMHx, Social Hx, Family Hx and treatment history.      Today's visit:  Patient is without complaints today.  She completed Injectafer 750 mg IV ×2 doses and 9/2017.  She reported improvement in energy and feeling better after receiving IV iron.    Past Medical History:   Diagnosis Date    Allergic rhinitis     FH: thyroid cancer     Gastritis and duodenitis 4/19/2016    Headache, classical migraine     occasional aura "every now and then"    Vitamin D deficiency disease          Social History:      Family History: family history includes Aneurysm in her father; Diabetes in her paternal grandmother; Hypertension in her mother and sister; Psoriasis in her maternal grandmother.        HPI    Review of Systems   Constitutional: Positive for fatigue. Negative for activity change, appetite change, fever and unexpected weight change.   HENT: Negative for congestion, dental problem, drooling, ear pain, facial swelling, mouth sores, nosebleeds and voice change.    Respiratory: Negative for apnea, cough, choking, chest tightness, shortness of breath and stridor.    Cardiovascular: Negative for chest pain, palpitations and leg swelling.   Gastrointestinal: " "Negative for abdominal distention, abdominal pain, blood in stool, constipation and vomiting.   Genitourinary: Positive for vaginal bleeding (intermittent menorrhagia). Negative for difficulty urinating, enuresis, flank pain, frequency, hematuria and pelvic pain.   Musculoskeletal: Negative for arthralgias, back pain, gait problem, joint swelling, myalgias and neck pain.   Skin: Negative for color change, pallor, rash and wound.   Neurological: Negative for dizziness, tremors, seizures, speech difficulty and light-headedness.   Hematological: Negative for adenopathy. Does not bruise/bleed easily.   Psychiatric/Behavioral: Negative for agitation, behavioral problems, confusion, dysphoric mood and hallucinations. The patient is not nervous/anxious and is not hyperactive.        Objective:       Vitals:    11/21/17 0744   BP: 120/80   BP Location: Right arm   Patient Position: Sitting   BP Method: Large (Manual)   Pulse: 62   Resp: 18   Temp: 98.1 °F (36.7 °C)   TempSrc: Oral   SpO2: 100%   Weight: 51.9 kg (114 lb 6.7 oz)   Height: 5' 4.96" (1.65 m)     Physical Exam   Constitutional: She is oriented to person, place, and time. She appears well-developed and well-nourished. No distress.   Well groomed   HENT:   Head: Normocephalic and atraumatic.   Right Ear: Tympanic membrane and ear canal normal.   Left Ear: Tympanic membrane and ear canal normal.   Nose: Nose normal. Right sinus exhibits no maxillary sinus tenderness and no frontal sinus tenderness. Left sinus exhibits no maxillary sinus tenderness and no frontal sinus tenderness.   Mouth/Throat: Oropharynx is clear and moist and mucous membranes are normal. No oral lesions. Normal dentition. No oropharyngeal exudate.   Eyes: Conjunctivae, EOM and lids are normal. Pupils are equal, round, and reactive to light. Lids are everted and swept, no foreign bodies found. Right eye exhibits no discharge. Left eye exhibits no discharge. No scleral icterus.   Neck: Trachea " normal and normal range of motion. Neck supple. No JVD present. No tracheal deviation present. No thyroid mass and no thyromegaly present.   No crepitus   Cardiovascular: Normal rate, regular rhythm, normal heart sounds, intact distal pulses and normal pulses.  Exam reveals no gallop and no friction rub.    No murmur heard.  Pulmonary/Chest: Effort normal and breath sounds normal. No stridor. No respiratory distress. She has no wheezes. She has no rales. She exhibits no tenderness.   Abdominal: Soft. Normal appearance and bowel sounds are normal. She exhibits no distension and no mass. There is no hepatosplenomegaly. There is no tenderness. There is no rebound and no guarding.   Musculoskeletal: Normal range of motion. She exhibits no edema or tenderness.   Lymphadenopathy:     She has no cervical adenopathy.   Neurological: She is alert and oriented to person, place, and time. No cranial nerve deficit. She exhibits normal muscle tone. Coordination normal.   Skin: Skin is warm, dry and intact. Capillary refill takes less than 2 seconds. No abrasion, no bruising, no ecchymosis and no rash noted. Rash is not macular, not pustular and not urticarial. She is not diaphoretic. No cyanosis. No pallor. Nails show no clubbing.   Psychiatric: She has a normal mood and affect. Her behavior is normal. Judgment and thought content normal.   Vitals reviewed.      Lab Results   Component Value Date    WBC 3.76 (L) 11/14/2017    HGB 13.4 11/14/2017    HCT 41.3 11/14/2017    MCV 89 11/14/2017     11/14/2017     Lab Results   Component Value Date    CREATININE 0.8 11/14/2017    BUN 8 11/14/2017     11/14/2017    K 4.1 11/14/2017     11/14/2017    CO2 27 11/14/2017     Lab Results   Component Value Date    ALT 12 11/14/2017    AST 13 11/14/2017    ALKPHOS 52 (L) 11/14/2017    BILITOT 0.9 11/14/2017         Assessment:       47-year-old female with long-standing iron deficiency anemia presumed to be secondary to  irregular menstrual cycles.  She completed Injectafer 750 mg IV ×2 doses in 9/2017 with improvement in hemoglobin and repletion of iron stores.  We will have the patient follow-up in 6 months with labs 1 week prior which will include CBC and iron studies.    Iron deficiency anemia:  --Injectafer 750 mg IV ×2 doses completed in 9/2017  --Repeat CBC iron studies in 6 months    Irregular menstrual cycles likely perimenopausal:  --Follow-up with GYN

## 2017-12-05 ENCOUNTER — OFFICE VISIT (OUTPATIENT)
Dept: INTERNAL MEDICINE | Facility: CLINIC | Age: 47
End: 2017-12-05
Payer: COMMERCIAL

## 2017-12-05 VITALS
SYSTOLIC BLOOD PRESSURE: 110 MMHG | HEIGHT: 65 IN | BODY MASS INDEX: 18.69 KG/M2 | OXYGEN SATURATION: 100 % | WEIGHT: 112.19 LBS | DIASTOLIC BLOOD PRESSURE: 76 MMHG | HEART RATE: 78 BPM | TEMPERATURE: 96 F

## 2017-12-05 DIAGNOSIS — D50.0 IRON DEFICIENCY ANEMIA DUE TO CHRONIC BLOOD LOSS: ICD-10-CM

## 2017-12-05 DIAGNOSIS — Z29.9 PREVENTIVE MEASURE: Primary | ICD-10-CM

## 2017-12-05 PROCEDURE — 99999 PR PBB SHADOW E&M-EST. PATIENT-LVL III: CPT | Mod: PBBFAC,,, | Performed by: INTERNAL MEDICINE

## 2017-12-05 PROCEDURE — 99213 OFFICE O/P EST LOW 20 MIN: CPT | Mod: 25,S$GLB,, | Performed by: INTERNAL MEDICINE

## 2017-12-05 PROCEDURE — 90686 IIV4 VACC NO PRSV 0.5 ML IM: CPT | Mod: S$GLB,,, | Performed by: INTERNAL MEDICINE

## 2017-12-05 PROCEDURE — 90471 IMMUNIZATION ADMIN: CPT | Mod: S$GLB,,, | Performed by: INTERNAL MEDICINE

## 2017-12-05 NOTE — PROGRESS NOTES
"Subjective:       Patient ID: Alexandra Rivas is a 47 y.o. female.    Chief Complaint: Follow-up    Here for follow up of medical problems.  Feels better since iron replaced IV.  To see new Gyn and now will has hys.  No f/c/sw/cough.  BMs and urine normal.  No cp/sob/palp.  Less migraines and no more dizziness since received iron infusion.    Updated/ annual due 1/18:  HM: 12/17 today fluvax, 8/10 TDaP, 2/17 MMG/ Gyn Dr. Venegas, 11/17 thyroid u/s neg to be repeated yearly.          Review of Systems   Constitutional: Negative for activity change, chills, diaphoresis, fever and unexpected weight change.   HENT: Negative for hearing loss, rhinorrhea and trouble swallowing.    Eyes: Negative for discharge and visual disturbance.   Respiratory: Negative for cough, chest tightness, shortness of breath and wheezing.    Cardiovascular: Negative for chest pain, palpitations and leg swelling.   Gastrointestinal: Negative for blood in stool, constipation, diarrhea, nausea and vomiting.   Endocrine: Negative for polydipsia and polyuria.   Genitourinary: Positive for menstrual problem. Negative for difficulty urinating, dysuria, frequency and hematuria.   Musculoskeletal: Negative for arthralgias, joint swelling and neck pain.   Neurological: Negative for weakness and headaches.   Psychiatric/Behavioral: Negative for confusion and dysphoric mood. The patient is not nervous/anxious.        Objective:   /76 (BP Location: Right arm, Patient Position: Sitting, BP Method: Medium (Manual))   Pulse 78   Temp 96.3 °F (35.7 °C) (Tympanic)   Ht 5' 5" (1.651 m)   Wt 50.9 kg (112 lb 3.4 oz)   LMP 11/21/2017 (Approximate)   SpO2 100%   BMI 18.67 kg/m²     Physical Exam   Constitutional: She is oriented to person, place, and time. She appears well-developed.   HENT:   Mouth/Throat: Oropharynx is clear and moist.   Neck: Neck supple. Carotid bruit is not present. No thyroid mass present.   Cardiovascular: Normal rate, " regular rhythm and intact distal pulses.  Exam reveals no gallop and no friction rub.    No murmur heard.  Pulmonary/Chest: Effort normal and breath sounds normal. She has no wheezes. She has no rales.   Abdominal: Soft. Bowel sounds are normal. She exhibits no mass. There is no hepatosplenomegaly. There is no tenderness.   Musculoskeletal: She exhibits no edema.   Lymphadenopathy:     She has no cervical adenopathy.   Neurological: She is alert and oriented to person, place, and time.   Psychiatric: She has a normal mood and affect.       Assessment:       1. Preventive measure    2. Iron deficiency anemia due to chronic blood loss        Plan:       Alexandra was seen today for follow-up.    Diagnoses and all orders for this visit:    Preventive measure- will do in 4mo.  Estab new Gyn.  -     Comprehensive metabolic panel; Future  -     Lipid panel; Future  -     Mammo Digital Screening Bilat with CAD; Future  -     Ambulatory consult to Gynecology  -     Vitamin D; Future  -     TSH; Future  -     CBC auto differential; Future    Iron deficiency anemia due to chronic blood loss- to f/u with HemeOnc 6mo.

## 2018-03-14 ENCOUNTER — LAB VISIT (OUTPATIENT)
Dept: LAB | Facility: HOSPITAL | Age: 48
End: 2018-03-14
Attending: INTERNAL MEDICINE
Payer: COMMERCIAL

## 2018-03-14 DIAGNOSIS — Z29.9 PREVENTIVE MEASURE: ICD-10-CM

## 2018-03-14 LAB
25(OH)D3+25(OH)D2 SERPL-MCNC: 19 NG/ML
ALBUMIN SERPL BCP-MCNC: 4.1 G/DL
ALP SERPL-CCNC: 47 U/L
ALT SERPL W/O P-5'-P-CCNC: 8 U/L
ANION GAP SERPL CALC-SCNC: 9 MMOL/L
AST SERPL-CCNC: 16 U/L
BASOPHILS # BLD AUTO: 0.04 K/UL
BASOPHILS NFR BLD: 1 %
BILIRUB SERPL-MCNC: 0.9 MG/DL
BUN SERPL-MCNC: 11 MG/DL
CALCIUM SERPL-MCNC: 9.8 MG/DL
CHLORIDE SERPL-SCNC: 104 MMOL/L
CHOLEST SERPL-MCNC: 209 MG/DL
CHOLEST/HDLC SERPL: 3.6 {RATIO}
CO2 SERPL-SCNC: 28 MMOL/L
CREAT SERPL-MCNC: 0.8 MG/DL
DIFFERENTIAL METHOD: ABNORMAL
EOSINOPHIL # BLD AUTO: 0.1 K/UL
EOSINOPHIL NFR BLD: 2.2 %
ERYTHROCYTE [DISTWIDTH] IN BLOOD BY AUTOMATED COUNT: 12.4 %
EST. GFR  (AFRICAN AMERICAN): >60 ML/MIN/1.73 M^2
EST. GFR  (NON AFRICAN AMERICAN): >60 ML/MIN/1.73 M^2
GLUCOSE SERPL-MCNC: 70 MG/DL
HCT VFR BLD AUTO: 41.7 %
HDLC SERPL-MCNC: 58 MG/DL
HDLC SERPL: 27.8 %
HGB BLD-MCNC: 13.1 G/DL
IMM GRANULOCYTES # BLD AUTO: 0.01 K/UL
IMM GRANULOCYTES NFR BLD AUTO: 0.2 %
LDLC SERPL CALC-MCNC: 128.2 MG/DL
LYMPHOCYTES # BLD AUTO: 1.5 K/UL
LYMPHOCYTES NFR BLD: 36.5 %
MCH RBC QN AUTO: 28.5 PG
MCHC RBC AUTO-ENTMCNC: 31.4 G/DL
MCV RBC AUTO: 91 FL
MONOCYTES # BLD AUTO: 0.3 K/UL
MONOCYTES NFR BLD: 8.4 %
NEUTROPHILS # BLD AUTO: 2.1 K/UL
NEUTROPHILS NFR BLD: 51.7 %
NONHDLC SERPL-MCNC: 151 MG/DL
NRBC BLD-RTO: 0 /100 WBC
PLATELET # BLD AUTO: 256 K/UL
PMV BLD AUTO: 10.9 FL
POTASSIUM SERPL-SCNC: 4.1 MMOL/L
PROT SERPL-MCNC: 7.2 G/DL
RBC # BLD AUTO: 4.59 M/UL
SODIUM SERPL-SCNC: 141 MMOL/L
TRIGL SERPL-MCNC: 114 MG/DL
TSH SERPL DL<=0.005 MIU/L-ACNC: 1.64 UIU/ML
WBC # BLD AUTO: 4.03 K/UL

## 2018-03-14 PROCEDURE — 84443 ASSAY THYROID STIM HORMONE: CPT

## 2018-03-14 PROCEDURE — 36415 COLL VENOUS BLD VENIPUNCTURE: CPT | Mod: PO

## 2018-03-14 PROCEDURE — 85025 COMPLETE CBC W/AUTO DIFF WBC: CPT

## 2018-03-14 PROCEDURE — 80053 COMPREHEN METABOLIC PANEL: CPT

## 2018-03-14 PROCEDURE — 82306 VITAMIN D 25 HYDROXY: CPT

## 2018-03-14 PROCEDURE — 80061 LIPID PANEL: CPT

## 2018-03-22 NOTE — PROGRESS NOTES
"Subjective:      Patient ID: Alexandra Rivas is a 47 y.o. female.    Chief Complaint: No chief complaint on file.      HPI  Here for f/u medical problems and preventive exam.  Energy good.  Walking for exercise.  Taking weekly vit D.  No f/c/sw/cough.  No cp/sob/palp.  BMs and urine normal.  Menses are irregular in past 3-4mo.  Having hot flashes.  Migraines less often, 2 in a month.      HM: 12/17 fluvax, 8/10 TDaP, 3/17 MMG, 4/18 sched Gyn Dr. Acevedo, 11/17 thyroid u/s neg to be repeated yearly.    Review of Systems   Constitutional: Negative for appetite change, chills, diaphoresis and fever.   HENT: Negative for congestion, ear pain, rhinorrhea, sinus pressure and sore throat.    Respiratory: Negative for cough, chest tightness and shortness of breath.    Cardiovascular: Negative for chest pain and palpitations.   Gastrointestinal: Negative for blood in stool, constipation, diarrhea, nausea and vomiting.   Genitourinary: Negative for dysuria, frequency, hematuria, menstrual problem, urgency and vaginal discharge.   Musculoskeletal: Negative for arthralgias.   Skin: Negative for rash.   Neurological: Negative for dizziness and headaches.   Psychiatric/Behavioral: Negative for sleep disturbance. The patient is not nervous/anxious.          Objective:   BP (!) 104/58 (BP Location: Right arm, Patient Position: Sitting, BP Method: Small (Manual))   Pulse 110   Temp 96.5 °F (35.8 °C) (Tympanic)   Ht 5' 5" (1.651 m)   Wt 49.3 kg (108 lb 11 oz)   LMP 12/23/2017 (Approximate)   SpO2 98%   BMI 18.09 kg/m²     Physical Exam   Constitutional: She is oriented to person, place, and time. She appears well-developed and well-nourished.   HENT:   Right Ear: External ear normal. Tympanic membrane is not injected.   Left Ear: External ear normal. Tympanic membrane is not injected.   Mouth/Throat: Oropharynx is clear and moist.   Eyes: Conjunctivae are normal.   Neck: Normal range of motion. Neck supple. No " thyromegaly present.   Cardiovascular: Normal rate, regular rhythm and intact distal pulses.  Exam reveals no gallop and no friction rub.    No murmur heard.  Pulmonary/Chest: Effort normal and breath sounds normal. She has no wheezes. She has no rales.   Abdominal: Soft. Bowel sounds are normal. She exhibits no mass. There is no tenderness.   Musculoskeletal: She exhibits no edema.   Lymphadenopathy:     She has no cervical adenopathy.   Neurological: She is alert and oriented to person, place, and time.   Skin: Skin is warm. No rash noted.   Psychiatric: She has a normal mood and affect.         Results for orders placed or performed in visit on 03/14/18   Comprehensive metabolic panel   Result Value Ref Range    Sodium 141 136 - 145 mmol/L    Potassium 4.1 3.5 - 5.1 mmol/L    Chloride 104 95 - 110 mmol/L    CO2 28 23 - 29 mmol/L    Glucose 70 70 - 110 mg/dL    BUN, Bld 11 6 - 20 mg/dL    Creatinine 0.8 0.5 - 1.4 mg/dL    Calcium 9.8 8.7 - 10.5 mg/dL    Total Protein 7.2 6.0 - 8.4 g/dL    Albumin 4.1 3.5 - 5.2 g/dL    Total Bilirubin 0.9 0.1 - 1.0 mg/dL    Alkaline Phosphatase 47 (L) 55 - 135 U/L    AST 16 10 - 40 U/L    ALT 8 (L) 10 - 44 U/L    Anion Gap 9 8 - 16 mmol/L    eGFR if African American >60.0 >60 mL/min/1.73 m^2    eGFR if non African American >60.0 >60 mL/min/1.73 m^2   Lipid panel   Result Value Ref Range    Cholesterol 209 (H) 120 - 199 mg/dL    Triglycerides 114 30 - 150 mg/dL    HDL 58 40 - 75 mg/dL    LDL Cholesterol 128.2 63.0 - 159.0 mg/dL    HDL/Chol Ratio 27.8 20.0 - 50.0 %    Total Cholesterol/HDL Ratio 3.6 2.0 - 5.0    Non-HDL Cholesterol 151 mg/dL   Vitamin D   Result Value Ref Range    Vit D, 25-Hydroxy 19 (L) 30 - 96 ng/mL   TSH   Result Value Ref Range    TSH 1.639 0.400 - 4.000 uIU/mL   CBC auto differential   Result Value Ref Range    WBC 4.03 3.90 - 12.70 K/uL    RBC 4.59 4.00 - 5.40 M/uL    Hemoglobin 13.1 12.0 - 16.0 g/dL    Hematocrit 41.7 37.0 - 48.5 %    MCV 91 82 - 98 fL    MCH  28.5 27.0 - 31.0 pg    MCHC 31.4 (L) 32.0 - 36.0 g/dL    RDW 12.4 11.5 - 14.5 %    Platelets 256 150 - 350 K/uL    MPV 10.9 9.2 - 12.9 fL    Immature Granulocytes 0.2 0.0 - 0.5 %    Gran # (ANC) 2.1 1.8 - 7.7 K/uL    Immature Grans (Abs) 0.01 0.00 - 0.04 K/uL    Lymph # 1.5 1.0 - 4.8 K/uL    Mono # 0.3 0.3 - 1.0 K/uL    Eos # 0.1 0.0 - 0.5 K/uL    Baso # 0.04 0.00 - 0.20 K/uL    nRBC 0 0 /100 WBC    Gran% 51.7 38.0 - 73.0 %    Lymph% 36.5 18.0 - 48.0 %    Mono% 8.4 4.0 - 15.0 %    Eosinophil% 2.2 0.0 - 8.0 %    Basophil% 1.0 0.0 - 1.9 %    Differential Method Automated        Assessment:       1. Encounter for preventive health examination    2. Migraine with aura and without status migrainosus, not intractable    3. Vitamin D deficiency disease    4. Iron deficiency anemia due to chronic blood loss    5. FH: thyroid cancer    6. Chronic seasonal allergic rhinitis due to pollen    7. Postmenopausal disorder          Plan:     Encounter for preventive health examination- utd.    Migraine with aura and without status migrainosus, not intractable- cont elavil prn.    Vitamin D deficiency disease- change to daily D3 2K, recheck 11/18.    Iron deficiency anemia due to chronic blood loss    FH: thyroid cancer- needs u/s 11/18.    Chronic seasonal allergic rhinitis due to pollen doing well.    Postmenopausal disorder  -     vitamin E 400 UNIT capsule; Take 1 capsule (400 Units total) by mouth once daily.    RTC 11/18.

## 2018-03-23 ENCOUNTER — OFFICE VISIT (OUTPATIENT)
Dept: INTERNAL MEDICINE | Facility: CLINIC | Age: 48
End: 2018-03-23
Payer: COMMERCIAL

## 2018-03-23 ENCOUNTER — HOSPITAL ENCOUNTER (OUTPATIENT)
Dept: RADIOLOGY | Facility: HOSPITAL | Age: 48
Discharge: HOME | End: 2018-03-23
Attending: INTERNAL MEDICINE
Payer: COMMERCIAL

## 2018-03-23 VITALS
TEMPERATURE: 97 F | OXYGEN SATURATION: 98 % | HEIGHT: 65 IN | HEIGHT: 65 IN | WEIGHT: 112 LBS | WEIGHT: 108.69 LBS | BODY MASS INDEX: 18.11 KG/M2 | DIASTOLIC BLOOD PRESSURE: 58 MMHG | HEART RATE: 110 BPM | BODY MASS INDEX: 18.66 KG/M2 | SYSTOLIC BLOOD PRESSURE: 104 MMHG

## 2018-03-23 DIAGNOSIS — G43.109 MIGRAINE WITH AURA AND WITHOUT STATUS MIGRAINOSUS, NOT INTRACTABLE: ICD-10-CM

## 2018-03-23 DIAGNOSIS — N95.1 POSTMENOPAUSAL DISORDER: ICD-10-CM

## 2018-03-23 DIAGNOSIS — D50.0 IRON DEFICIENCY ANEMIA DUE TO CHRONIC BLOOD LOSS: ICD-10-CM

## 2018-03-23 DIAGNOSIS — E55.9 VITAMIN D DEFICIENCY DISEASE: ICD-10-CM

## 2018-03-23 DIAGNOSIS — Z80.8 FH: THYROID CANCER: ICD-10-CM

## 2018-03-23 DIAGNOSIS — J30.1 CHRONIC SEASONAL ALLERGIC RHINITIS DUE TO POLLEN: ICD-10-CM

## 2018-03-23 DIAGNOSIS — Z00.00 ENCOUNTER FOR PREVENTIVE HEALTH EXAMINATION: Primary | ICD-10-CM

## 2018-03-23 DIAGNOSIS — Z29.9 PREVENTIVE MEASURE: ICD-10-CM

## 2018-03-23 PROCEDURE — 77067 SCR MAMMO BI INCL CAD: CPT | Mod: 26,,, | Performed by: RADIOLOGY

## 2018-03-23 PROCEDURE — 99999 PR PBB SHADOW E&M-EST. PATIENT-LVL III: CPT | Mod: PBBFAC,,, | Performed by: INTERNAL MEDICINE

## 2018-03-23 PROCEDURE — 77067 SCR MAMMO BI INCL CAD: CPT | Mod: TC,PO

## 2018-03-23 PROCEDURE — 77063 BREAST TOMOSYNTHESIS BI: CPT | Mod: 26,,, | Performed by: RADIOLOGY

## 2018-03-23 PROCEDURE — 99396 PREV VISIT EST AGE 40-64: CPT | Mod: S$GLB,,, | Performed by: INTERNAL MEDICINE

## 2018-03-23 RX ORDER — ACETAMINOPHEN 500 MG
1 TABLET ORAL DAILY
Qty: 100 CAPSULE | Refills: 6 | Status: SHIPPED | OUTPATIENT
Start: 2018-03-23 | End: 2020-07-08

## 2018-03-23 RX ORDER — VITAMIN E 268 MG
400 CAPSULE ORAL DAILY
COMMUNITY
Start: 2018-03-23 | End: 2022-01-31

## 2018-04-05 ENCOUNTER — OFFICE VISIT (OUTPATIENT)
Dept: OBSTETRICS AND GYNECOLOGY | Facility: CLINIC | Age: 48
End: 2018-04-05
Payer: COMMERCIAL

## 2018-04-05 VITALS
DIASTOLIC BLOOD PRESSURE: 70 MMHG | BODY MASS INDEX: 18.18 KG/M2 | HEIGHT: 65 IN | SYSTOLIC BLOOD PRESSURE: 118 MMHG | WEIGHT: 109.13 LBS

## 2018-04-05 DIAGNOSIS — Z01.419 ENCOUNTER FOR GYNECOLOGICAL EXAMINATION WITHOUT ABNORMAL FINDING: Primary | ICD-10-CM

## 2018-04-05 PROCEDURE — 99396 PREV VISIT EST AGE 40-64: CPT | Mod: S$GLB,,, | Performed by: OBSTETRICS & GYNECOLOGY

## 2018-04-05 PROCEDURE — 99999 PR PBB SHADOW E&M-EST. PATIENT-LVL III: CPT | Mod: PBBFAC,,, | Performed by: OBSTETRICS & GYNECOLOGY

## 2018-04-05 PROCEDURE — 88175 CYTOPATH C/V AUTO FLUID REDO: CPT

## 2018-04-05 NOTE — PROGRESS NOTES
"CC: Well woman exam    Alexandra Rivas is a 47 y.o. female  presents for a well woman exam.  LMP: No LMP recorded. Patient is not currently having periods (Reason: Perimenopausal). LMP 2017, had cycles oct, nov, dec but none 4months prior to that. Sheba-menopausal symptoms, tolerating them. On vitamin E supplements. Has had iron infusions in past, cycles used to be very heavy.    Past Medical History:   Diagnosis Date    Allergic rhinitis     FH: thyroid cancer     Gastritis and duodenitis 2016    Headache, classical migraine     occasional aura "every now and then"    Vitamin D deficiency disease    uterine fibroids    Past Surgical History:   Procedure Laterality Date    TONSILLECTOMY      TUBAL LIGATION       Social History     Social History    Marital status:      Spouse name: N/A    Number of children: N/A    Years of education: N/A     Social History Main Topics    Smoking status: Never Smoker    Smokeless tobacco: Never Used    Alcohol use 0.0 oz/week      Comment: occasionally    Drug use: No    Sexual activity: Yes     Partners: Male     Birth control/ protection: Surgical      Comment: BTL; mut monog     Other Topics Concern    Are You Pregnant Or Think You May Be? No    Breast-Feeding No     Social History Narrative    No narrative on file     Family History   Problem Relation Age of Onset    Hypertension Mother     Aneurysm Father     Thyroid cancer       papillary, mother and sister 43y.    Hypertension Sister     Diabetes Paternal Grandmother     Psoriasis Maternal Grandmother     Breast cancer Neg Hx     Colon cancer Neg Hx     Ovarian cancer Neg Hx     Melanoma Neg Hx     Lupus Neg Hx     Eczema Neg Hx     Thrombophilia Neg Hx      OB History      Para Term  AB Living    1 1 0     1    SAB TAB Ectopic Multiple Live Births                       Current Outpatient Prescriptions:     amitriptyline (ELAVIL) 10 MG tablet, Take 1 " "tablet (10 mg total) by mouth nightly as needed for Insomnia., Disp: 90 tablet, Rfl: 3    cholecalciferol, vitamin D3, 2,000 unit Cap, Take 1 capsule (2,000 Units total) by mouth once daily., Disp: 100 capsule, Rfl: 6    clobetasol (TEMOVATE) 0.05 % external solution, Apply to affected areas of scalp 1-2 times daily as needed for itch, Disp: 50 mL, Rfl: 3    fluticasone (FLONASE) 50 mcg/actuation nasal spray, 2 sprays by Each Nare route once daily., Disp: 3 Bottle, Rfl: 3    ketoconazole (NIZORAL) 2 % shampoo, Wash hair with medicated shampoo at least 1x/week - let sit on scalp at least 5 minutes prior to rinsing, Disp: 120 mL, Rfl: 5    loratadine (CLARITIN) 10 mg tablet, Take 1 tablet (10 mg total) by mouth once daily., Disp: , Rfl: 0    naproxen (NAPROSYN) 500 MG tablet, Take 1 tablet (500 mg total) by mouth 2 (two) times daily as needed (pain)., Disp: 30 tablet, Rfl: 2    omeprazole (PRILOSEC) 40 MG capsule, Take 1 capsule (40 mg total) by mouth once daily., Disp: 90 capsule, Rfl: 3    vitamin E 400 UNIT capsule, Take 1 capsule (400 Units total) by mouth once daily., Disp: , Rfl:     GYNECOLOGY HISTORY:  No abnormal pap/std    DATA REVIEWED:  Last pap: normal Date: 2014  Last mmg: normal Date: 2018  Ultrasound 2015  Findings: The uterus measured 10.1 x 4.1 x 5.7 cm and contained numerous fibroids.  The endometrium was 3.1 mm.  The right ovary was not seen.  The left ovary measured 3.5 x 4.1 x 2.2 cm and contained a septated follicle measuring 16 x 16 x 13 mm and a   complex follicle measuring 21 x 12 x 14 mm.  There was no free fluid or adnexal mass identified. There were two fibroids noted.  The superior 5 Grover contain calcifications and measures 16 x 15 x 19 mm and or fibroid measuring 16 x 14 x 19 mm.  there were   a few nabothian cysts in the cervix.    /70   Ht 5' 5" (1.651 m)   Wt 49.5 kg (109 lb 2 oz)   BMI 18.16 kg/m²     ROS:  GENERAL: Denies weight gain or weight loss. Feeling well " overall.   SKIN: Denies rash or lesions.   HEAD: Denies head injury or headache.   NODES: Denies enlarged lymph nodes.   CHEST: Denies chest pain or shortness of breath.   CARDIOVASCULAR: Denies palpitations or left sided chest pain.   ABDOMEN: No abdominal pain, constipation, diarrhea, nausea, vomiting or rectal bleeding.   URINARY: No frequency, dysuria, hematuria, or burning on urination.  REPRODUCTIVE: See HPI.   BREASTS: The patient denies pain, lumps, or nipple discharge.   HEMATOLOGIC: No easy bruisability or excessive bleeding.   MUSCULOSKELETAL: Denies joint pain or swelling.   NEUROLOGIC: Denies syncope or weakness.   PSYCHIATRIC: Denies depression, anxiety or mood swings.    PHYSICAL EXAM:    APPEARANCE: Well nourished, well developed, in no acute distress.  AFFECT: WNL, alert and oriented x 3  SKIN: No acne or hirsutism  NECK: Neck symmetric without masses or thyromegaly  NODES: No inguinal, cervical, axillary, or femoral lymph node enlargement  CHEST: Good respiratory effect  ABDOMEN: Soft.  No tenderness or masses.  No hepatosplenomegaly.  No hernias.  BREASTS: Symmetrical, no skin changes or visible lesions.  No palpable masses, nipple discharge bilaterally.  PELVIC: Normal external genitalia without lesions.  Normal hair distribution.  Adequate perineal body, normal urethral meatus.  Vagina atrophic without lesions or discharge.  Cervix pink, without lesions, discharge or tenderness.  No significant cystocele or rectocele.  Bimanual exam shows uterus to be normal size, regular, mobile and nontender.  Adnexa without masses or tenderness.   EXTREMITIES: No edema.    Encounter for gynecological examination without abnormal finding  -     Liquid-based pap smear, screening    Patient was counseled today on A.C.S. Pap guidelines (q3) and recommendations for yearly pelvic exams, yearly mammograms starting age 40, and clinical breast exams; to see her PCP for other health maintenance.

## 2018-04-13 ENCOUNTER — PATIENT MESSAGE (OUTPATIENT)
Dept: OBSTETRICS AND GYNECOLOGY | Facility: HOSPITAL | Age: 48
End: 2018-04-13

## 2018-05-21 ENCOUNTER — LAB VISIT (OUTPATIENT)
Dept: LAB | Facility: HOSPITAL | Age: 48
End: 2018-05-21
Attending: INTERNAL MEDICINE
Payer: COMMERCIAL

## 2018-05-21 DIAGNOSIS — D50.0 IRON DEFICIENCY ANEMIA DUE TO CHRONIC BLOOD LOSS: ICD-10-CM

## 2018-05-21 DIAGNOSIS — D50.9 MICROCYTIC ANEMIA: ICD-10-CM

## 2018-05-21 LAB
ALBUMIN SERPL BCP-MCNC: 4.1 G/DL
ALP SERPL-CCNC: 46 U/L
ALT SERPL W/O P-5'-P-CCNC: 6 U/L
ANION GAP SERPL CALC-SCNC: 10 MMOL/L
AST SERPL-CCNC: 13 U/L
BASOPHILS # BLD AUTO: 0.02 K/UL
BASOPHILS NFR BLD: 0.5 %
BILIRUB SERPL-MCNC: 0.9 MG/DL
BUN SERPL-MCNC: 11 MG/DL
CALCIUM SERPL-MCNC: 9.9 MG/DL
CHLORIDE SERPL-SCNC: 104 MMOL/L
CO2 SERPL-SCNC: 28 MMOL/L
CREAT SERPL-MCNC: 0.9 MG/DL
DIFFERENTIAL METHOD: ABNORMAL
EOSINOPHIL # BLD AUTO: 0.1 K/UL
EOSINOPHIL NFR BLD: 1.5 %
ERYTHROCYTE [DISTWIDTH] IN BLOOD BY AUTOMATED COUNT: 12.8 %
EST. GFR  (AFRICAN AMERICAN): >60 ML/MIN/1.73 M^2
EST. GFR  (NON AFRICAN AMERICAN): >60 ML/MIN/1.73 M^2
FERRITIN SERPL-MCNC: 391 NG/ML
GLUCOSE SERPL-MCNC: 69 MG/DL
HCT VFR BLD AUTO: 40.4 %
HGB BLD-MCNC: 12.8 G/DL
IRON SERPL-MCNC: 81 UG/DL
LDH SERPL L TO P-CCNC: 127 U/L
LYMPHOCYTES # BLD AUTO: 1.6 K/UL
LYMPHOCYTES NFR BLD: 40.4 %
MCH RBC QN AUTO: 27.8 PG
MCHC RBC AUTO-ENTMCNC: 31.7 G/DL
MCV RBC AUTO: 88 FL
MONOCYTES # BLD AUTO: 0.2 K/UL
MONOCYTES NFR BLD: 4.4 %
NEUTROPHILS # BLD AUTO: 2.2 K/UL
NEUTROPHILS NFR BLD: 53.2 %
PLATELET # BLD AUTO: 231 K/UL
PMV BLD AUTO: 9.8 FL
POTASSIUM SERPL-SCNC: 3.8 MMOL/L
PROT SERPL-MCNC: 7.1 G/DL
RBC # BLD AUTO: 4.61 M/UL
RETICS/RBC NFR AUTO: 1 %
SATURATED IRON: 27 %
SODIUM SERPL-SCNC: 142 MMOL/L
TOTAL IRON BINDING CAPACITY: 302 UG/DL
TRANSFERRIN SERPL-MCNC: 204 MG/DL
WBC # BLD AUTO: 4.06 K/UL

## 2018-05-21 PROCEDURE — 85045 AUTOMATED RETICULOCYTE COUNT: CPT

## 2018-05-21 PROCEDURE — 85025 COMPLETE CBC W/AUTO DIFF WBC: CPT | Mod: PO

## 2018-05-21 PROCEDURE — 83615 LACTATE (LD) (LDH) ENZYME: CPT | Mod: PO

## 2018-05-21 PROCEDURE — 83540 ASSAY OF IRON: CPT

## 2018-05-21 PROCEDURE — 80053 COMPREHEN METABOLIC PANEL: CPT | Mod: PO

## 2018-05-21 PROCEDURE — 36415 COLL VENOUS BLD VENIPUNCTURE: CPT | Mod: PO

## 2018-05-21 PROCEDURE — 82728 ASSAY OF FERRITIN: CPT

## 2018-05-29 ENCOUNTER — OFFICE VISIT (OUTPATIENT)
Dept: HEMATOLOGY/ONCOLOGY | Facility: CLINIC | Age: 48
End: 2018-05-29
Payer: COMMERCIAL

## 2018-05-29 VITALS
RESPIRATION RATE: 18 BRPM | SYSTOLIC BLOOD PRESSURE: 102 MMHG | HEART RATE: 90 BPM | TEMPERATURE: 98 F | HEIGHT: 65 IN | OXYGEN SATURATION: 99 % | BODY MASS INDEX: 18.03 KG/M2 | DIASTOLIC BLOOD PRESSURE: 68 MMHG | WEIGHT: 108.25 LBS

## 2018-05-29 DIAGNOSIS — D50.0 IRON DEFICIENCY ANEMIA DUE TO CHRONIC BLOOD LOSS: Primary | ICD-10-CM

## 2018-05-29 PROCEDURE — 99999 PR PBB SHADOW E&M-EST. PATIENT-LVL III: CPT | Mod: PBBFAC,,, | Performed by: INTERNAL MEDICINE

## 2018-05-29 PROCEDURE — 99214 OFFICE O/P EST MOD 30 MIN: CPT | Mod: S$GLB,,, | Performed by: INTERNAL MEDICINE

## 2018-05-29 PROCEDURE — 3008F BODY MASS INDEX DOCD: CPT | Mod: CPTII,S$GLB,, | Performed by: INTERNAL MEDICINE

## 2018-05-29 NOTE — PROGRESS NOTES
"Hematology/Oncology Office Note    Reason for referral:  Iron deficiency    CC:  Iron deficiency    Referred by:  No ref. provider found    Diagnosis:  Iron deficiency anemia with irregular menses    Treatment:  Intermittent oral iron supplementation with poor compliance due to significant GI side effects    History of present illness:  47-year-old premenopausal female with long-standing iron deficiency anemia who is responded poorly to oral iron supplementation in the past.  Her compliance with oral iron supplementation has been spotty due to significant GI side effects including nausea and constipation.  She currently has irregular menstrual cycles.  She denies melena, hematochezia, hematuria, or other forms of bleeding.    I have reviewed and updated the HPI, ROS, PMHx, Social Hx, Family Hx and treatment history.      Today's visit:  Patient is without complaints today.  She reports no menstrual cycles over the previous 5+ months.  She denies melena, hematochezia, hematuria, or other forms of bleeding.    Past Medical History:   Diagnosis Date    Allergic rhinitis     FH: thyroid cancer     Gastritis and duodenitis 4/19/2016    Headache, classical migraine     occasional aura "every now and then"    Vitamin D deficiency disease          Social History:      Family History: family history includes Aneurysm in her father; Diabetes in her paternal grandmother; Hypertension in her mother and sister; Psoriasis in her maternal grandmother.        HPI    Review of Systems   Constitutional: Negative for activity change, appetite change, chills, diaphoresis, fatigue, fever and unexpected weight change.   HENT: Negative for congestion, dental problem, drooling, ear discharge, ear pain, facial swelling, hearing loss, mouth sores and nosebleeds.    Eyes: Negative.    Respiratory: Negative for apnea, cough, choking, chest tightness, shortness of breath and stridor.    Cardiovascular: Negative for chest pain, palpitations " "and leg swelling.   Gastrointestinal: Negative for abdominal distention, abdominal pain, anal bleeding, blood in stool, constipation, diarrhea and vomiting.   Endocrine: Negative.    Genitourinary: Negative for difficulty urinating, enuresis, flank pain, frequency, hematuria and pelvic pain.   Musculoskeletal: Negative for arthralgias, back pain, gait problem, joint swelling, myalgias, neck pain and neck stiffness.   Skin: Negative for color change, pallor, rash and wound.   Allergic/Immunologic: Negative.    Neurological: Negative for dizziness, tremors, seizures, facial asymmetry, speech difficulty, light-headedness and headaches.   Hematological: Negative for adenopathy. Does not bruise/bleed easily.   Psychiatric/Behavioral: Negative for agitation, behavioral problems, confusion, decreased concentration, dysphoric mood and hallucinations. The patient is not nervous/anxious and is not hyperactive.        Objective:       Vitals:    05/29/18 0753   BP: 102/68   Pulse: 90   Resp: 18   Temp: 97.9 °F (36.6 °C)   TempSrc: Oral   SpO2: 99%   Weight: 49.1 kg (108 lb 3.9 oz)   Height: 5' 5" (1.651 m)     Physical Exam   Constitutional: She is oriented to person, place, and time. She appears well-developed and well-nourished. No distress.   Well groomed   HENT:   Head: Normocephalic and atraumatic.   Right Ear: Tympanic membrane and ear canal normal.   Left Ear: Tympanic membrane and ear canal normal.   Nose: Nose normal. Right sinus exhibits no maxillary sinus tenderness and no frontal sinus tenderness. Left sinus exhibits no maxillary sinus tenderness and no frontal sinus tenderness.   Mouth/Throat: Oropharynx is clear and moist and mucous membranes are normal. No oral lesions. Normal dentition. No oropharyngeal exudate.   Eyes: Conjunctivae, EOM and lids are normal. Pupils are equal, round, and reactive to light. Lids are everted and swept, no foreign bodies found. Right eye exhibits no discharge. Left eye exhibits no " discharge. No scleral icterus.   Neck: Trachea normal and normal range of motion. Neck supple. No tracheal deviation present. No thyroid mass and no thyromegaly present.   No crepitus   Cardiovascular: Normal rate, regular rhythm, normal heart sounds, intact distal pulses and normal pulses.  Exam reveals no gallop and no friction rub.    No murmur heard.  Pulmonary/Chest: Effort normal and breath sounds normal. No respiratory distress. She has no wheezes. She has no rales. She exhibits no tenderness.   Abdominal: Soft. Normal appearance and bowel sounds are normal. She exhibits no distension and no mass. There is no hepatosplenomegaly. There is no tenderness. There is no rebound and no guarding. No hernia.   Musculoskeletal: Normal range of motion. She exhibits no edema or tenderness.   Neurological: She is alert and oriented to person, place, and time. She displays normal reflexes. No cranial nerve deficit. She exhibits normal muscle tone. Coordination normal.   Skin: Skin is warm, dry and intact. Capillary refill takes less than 2 seconds. No rash noted. She is not diaphoretic. No cyanosis. No pallor. Nails show no clubbing.   Psychiatric: She has a normal mood and affect. Her behavior is normal. Judgment and thought content normal.   Vitals reviewed.      Lab Results   Component Value Date    WBC 4.06 05/21/2018    HGB 12.8 05/21/2018    HCT 40.4 05/21/2018    MCV 88 05/21/2018     05/21/2018     Lab Results   Component Value Date    CREATININE 0.9 05/21/2018    BUN 11 05/21/2018     05/21/2018    K 3.8 05/21/2018     05/21/2018    CO2 28 05/21/2018     Lab Results   Component Value Date    ALT 6 (L) 05/21/2018    AST 13 05/21/2018    ALKPHOS 46 (L) 05/21/2018    BILITOT 0.9 05/21/2018         Assessment:       47-year-old female with long-standing iron deficiency anemia presumed to be secondary to irregular menstrual cycles.  She completed Injectafer 750 mg IV ×2 doses in 9/2017 with  improvement in hemoglobin and repletion of iron stores.  CBC and iron studies performed on 05/21/2018 demonstrate normal H&H and adequate iron stores.  Patient has not had a menstrual cycle in 5+ months and iron deficiency unlikely to recur in absence of menstrual cycles.        Iron deficiency anemia:  --Injectafer 750 mg IV ×2 doses completed in 9/2017  --repeat CBC/iron studies in 1 year      Irregular menstrual cycles likely perimenopausal:  --Follow-up with GYN

## 2018-08-13 ENCOUNTER — OFFICE VISIT (OUTPATIENT)
Dept: INTERNAL MEDICINE | Facility: CLINIC | Age: 48
End: 2018-08-13
Payer: COMMERCIAL

## 2018-08-13 VITALS
HEART RATE: 76 BPM | SYSTOLIC BLOOD PRESSURE: 112 MMHG | OXYGEN SATURATION: 99 % | BODY MASS INDEX: 18.22 KG/M2 | WEIGHT: 109.38 LBS | HEIGHT: 65 IN | DIASTOLIC BLOOD PRESSURE: 84 MMHG | TEMPERATURE: 96 F

## 2018-08-13 DIAGNOSIS — M94.0 COSTOCHONDRITIS: Primary | ICD-10-CM

## 2018-08-13 PROCEDURE — 99999 PR PBB SHADOW E&M-EST. PATIENT-LVL IV: CPT | Mod: PBBFAC,,, | Performed by: PHYSICIAN ASSISTANT

## 2018-08-13 PROCEDURE — 3008F BODY MASS INDEX DOCD: CPT | Mod: CPTII,S$GLB,, | Performed by: PHYSICIAN ASSISTANT

## 2018-08-13 PROCEDURE — 96372 THER/PROPH/DIAG INJ SC/IM: CPT | Mod: S$GLB,,, | Performed by: PHYSICIAN ASSISTANT

## 2018-08-13 PROCEDURE — 99214 OFFICE O/P EST MOD 30 MIN: CPT | Mod: 25,S$GLB,, | Performed by: PHYSICIAN ASSISTANT

## 2018-08-13 RX ORDER — KETOROLAC TROMETHAMINE 30 MG/ML
60 INJECTION, SOLUTION INTRAMUSCULAR; INTRAVENOUS
Status: COMPLETED | OUTPATIENT
Start: 2018-08-13 | End: 2018-08-13

## 2018-08-13 RX ORDER — NAPROXEN 500 MG/1
500 TABLET ORAL 2 TIMES DAILY WITH MEALS
Qty: 14 TABLET | Refills: 0 | Status: SHIPPED | OUTPATIENT
Start: 2018-08-13 | End: 2018-08-20

## 2018-08-13 RX ADMIN — KETOROLAC TROMETHAMINE 60 MG: 30 INJECTION, SOLUTION INTRAMUSCULAR; INTRAVENOUS at 05:08

## 2018-08-13 NOTE — PROGRESS NOTES
Subjective:      Patient ID: Alexandra Rivas is a 48 y.o. female.    Chief Complaint: Chest Pain (left side)    Chest Pain    This is a new problem. The current episode started yesterday. The problem occurs constantly. The pain is present in the lateral region (left lower ribs). The pain is at a severity of 8/10. The quality of the pain is described as sharp. The pain radiates to the mid back (left back). Pertinent negatives include no abdominal pain, back pain, claudication, cough, diaphoresis, dizziness, exertional chest pressure, fever, headaches, hemoptysis, irregular heartbeat, leg pain, lower extremity edema, malaise/fatigue, nausea, near-syncope, numbness, orthopnea, palpitations, PND, shortness of breath, sputum production, syncope, vomiting or weakness. The pain is aggravated by deep breathing and movement. She has tried NSAIDs (400 ibuprofen every 6 hours) for the symptoms. The treatment provided mild relief. There are no known risk factors.   Pertinent negatives for past medical history include no aneurysm, no anxiety/panic attacks, no aortic aneurysm, no aortic dissection, no arrhythmia, no bicuspid aortic valve, no CAD, no cancer, no congenital heart disease, no connective tissue disease, no COPD, no CHF, no diabetes, no DVT, no hyperhomocysteinemia, no hyperlipidemia, no hypertension, no Kawasaki disease, no Marfan's syndrome, no MI, no mitral valve prolapse, no pacemaker, no PE, no PVD, no recent injury, no rheumatic fever, no seizures, no sickle cell disease, no sleep apnea, no spontaneous pneumothorax, no stimulant use, no strokes, no thyroid problem, no TIA, Calderon syndrome and no valve disorder.   Pertinent negatives for family medical history include: no aortic dissection, no CAD, no connective tissue disease, no diabetes, no heart disease, no hyperlipidemia, no hypertension, no Marfan's syndrome, no early MI, no PE, no PVD, no sickle cell disease, no stroke, no sudden death and no TIA.  "      Review of Systems   Constitutional: Negative for activity change, appetite change, chills, diaphoresis, fatigue, fever, malaise/fatigue and unexpected weight change.   HENT: Negative.  Negative for congestion, hearing loss, postnasal drip, rhinorrhea, sore throat, trouble swallowing and voice change.    Eyes: Negative.  Negative for visual disturbance.   Respiratory: Negative.  Negative for apnea, cough, hemoptysis, sputum production, choking, chest tightness, shortness of breath, wheezing and stridor.    Cardiovascular: Positive for chest pain. Negative for palpitations, orthopnea, claudication, leg swelling, syncope, PND and near-syncope.   Gastrointestinal: Negative for abdominal distention, abdominal pain, anal bleeding, blood in stool, constipation, diarrhea, nausea, rectal pain and vomiting.   Endocrine: Negative for cold intolerance, heat intolerance, polydipsia and polyuria.   Genitourinary: Negative.  Negative for decreased urine volume, difficulty urinating, dysuria, frequency, hematuria, pelvic pain, urgency, vaginal bleeding, vaginal discharge and vaginal pain.   Musculoskeletal: Negative for arthralgias, back pain, gait problem, joint swelling and myalgias.   Skin: Negative for color change, pallor, rash and wound.   Neurological: Negative for dizziness, tremors, seizures, weakness, light-headedness, numbness and headaches.   Hematological: Negative for adenopathy.   Psychiatric/Behavioral: Negative for behavioral problems, confusion, self-injury, sleep disturbance and suicidal ideas. The patient is not nervous/anxious.      Objective:   /84   Pulse 76   Temp 96.4 °F (35.8 °C) (Tympanic)   Ht 5' 5" (1.651 m)   Wt 49.6 kg (109 lb 5.6 oz)   SpO2 99%   BMI 18.20 kg/m²     Physical Exam   Constitutional: She is oriented to person, place, and time. She appears well-developed and well-nourished.   HENT:   Head: Normocephalic and atraumatic.   Eyes: Conjunctivae and EOM are normal. "   Cardiovascular: Normal rate, regular rhythm and normal heart sounds. Exam reveals no gallop and no friction rub.   No murmur heard.  Pulmonary/Chest: Effort normal and breath sounds normal. No respiratory distress. She has no decreased breath sounds. She has no wheezes. She has no rhonchi. She has no rales. Chest wall is not dull to percussion. She exhibits tenderness. She exhibits no mass, no bony tenderness, no laceration, no crepitus, no edema, no deformity, no swelling and no retraction.       Abdominal: Soft. She exhibits no distension. There is no tenderness. There is no guarding.   Musculoskeletal: Normal range of motion.   Neurological: She is alert and oriented to person, place, and time.   Skin: Skin is warm and dry. No rash noted. No erythema.   Psychiatric: She has a normal mood and affect. Her behavior is normal. Judgment and thought content normal.   Vitals reviewed.      Assessment:     1. Costochondritis      Plan:   Costochondritis  -     ketorolac injection 60 mg; Inject 2 mLs (60 mg total) into the muscle one time.  -     naproxen (NAPROSYN) 500 MG tablet; Take 1 tablet (500 mg total) by mouth 2 (two) times daily with meals. for 7 days  Dispense: 14 tablet; Refill: 0    -alternate ice and heat  -Educational handout on over-the-counter medications and at-home conservative care, pertinent to the patients diagnosis today, was handed to the patient and discussed in detail.  -red flags discussed. Handout provided.     Follow-up if symptoms worsen or fail to improve.

## 2018-08-13 NOTE — PATIENT INSTRUCTIONS
Chest Wall Pain: Costochondritis    The chest pain that you have had today is caused by costochondritis. This condition is caused by an inflammation of the cartilage joining your ribs to your breastbone. It is not caused by heart or lung problems. Your healthcare team has made sure that the chest pain you feel is not from a life threatening cause of chest pain such as heart attack, collapsed lung, blood clot in the lung, tear in the aorta, or esophageal rupture. The inflammation may have been brought on by a blow to the chest, lifting heavy objects, intense exercise, or an illness that made you cough and sneeze a lot. It often occurs during times of emotional stress. It can be painful, but it is not dangerous. It usually goes away in 1 to 2 weeks. But it may happen again. Rarely, a more serious condition may cause symptoms similar to costochondritis. Thats why its important to watch for the warning signs listed below.  Home care  Follow these guidelines when caring for yourself at home:  · If you feel that emotional stress is a cause of your condition, try to figure out the sources of that stress. It may not be obvious. Learn ways to deal with the stress in your life. This can include regular exercise, muscle relaxation, meditation, or simply taking time out for yourself.  · You may use acetaminophen, ibuprofen, or naproxen to control pain, unless another pain medicine was prescribed. If you have liver or kidney disease or ever had a stomach ulcer, talk with your healthcare provider before using these medicines.  · You can also help ease pain by using a hot, wet compress or heating pad. Use this with or without a medicated skin cream that helps relieves pain.  · Do stretching exercise as advised by your provider.  · Take any prescribed medicines as directed.  Follow-up care  Follow up with your healthcare provider, or as advised, if you do not start to get better in the next 2 days.  When to seek medical  advice  Call your healthcare provider right away if any of these occur:  · A change in the type of pain. Call if it feels different, becomes more serious, lasts longer, or spreads into your shoulder, arm, neck, jaw, or back.  · Shortness of breath or pain gets worse when you breathe  · Weakness, dizziness, or fainting  · Cough with dark-colored sputum (phlegm) or blood  · Abdominal pain  · Dark red or black stools  · Fever of 100.4ºF (38ºC) or higher, or as directed by your healthcare provider  Date Last Reviewed: 12/1/2016 © 2000-2017 [employer]. [address] [place] [address], [place] [address]. All rights reserved. This information is not intended as a substitute for professional medical care. Always follow your healthcare professional's instructions.        Costochondritis    Costochondritis is inflammation of a rib or the cartilage that connects a rib to your breastbone (sternum). It causes tenderness, and sometimes chest pain may be sharp or aching, or it may feel like pressure. Pain may get worse with deep breathing, movement, or exercise. In some cases, the pain is mistaken for a heart attack. Despite this, the condition is not serious. Read on to learn more about the condition and how it can be treated.  What causes costochondritis?  The cause of costochondritis is not completely clear, but it may happen after a chest injury, chest infection, or coughing episode. Some physical activities can sometimes lead to costochondritis. Large-breasted women may be more likely to have the condition. Often, the reason for the inflammation is unknown.  Diagnosing costochondritis  There is no test for costochondritis. The condition is diagnosed by the symptoms you have. Your healthcare provider will perform a physical exam. He or she will ask you about your symptoms and examine your chest for tenderness. In some cases, tests are done to rule out more serious problems. These tests may include imaging tests such as  chest X-ray, CT scan, or an ECG.  Treating costochondritis  If an underlying cause is found, treatment for that will likely relieve the problem. Costochondritis often goes away on its own. The course of the condition varies from person to person. It usually lasts from weeks to months. In some cases, mild symptoms continue for months to years. To ease symptoms:  · Take medicine as directed. These relieve pain and swelling. Ibuprofen or other NSAIDs are often recommended. In some cases, you may be given prescription medicine, such as muscle relaxants.  · Avoid activities that put stress on the chest or spine.  · Apply a heating pad (set to warm, not too high, heat) to the breastbone several times a day.  · Perform stretching exercises as directed.  Call the healthcare provider right away if you have any of the following:  · Pain that is not relieved by medicine  · Shortness of breath  · Lightheadedness, dizziness, or fainting  · Feeling of irregular heartbeat or fast pulse  Anyone with chest pain should see a healthcare provider, especially those who are older and may be at risk for heart disease.   Date Last Reviewed: 10/1/2016  © 5500-3697 [employer]. [address] [place] [address], [place] [address]. All rights reserved. This information is not intended as a substitute for professional medical care. Always follow your healthcare professional's instructions.

## 2018-08-16 ENCOUNTER — PATIENT MESSAGE (OUTPATIENT)
Dept: INTERNAL MEDICINE | Facility: CLINIC | Age: 48
End: 2018-08-16

## 2018-11-07 NOTE — PROGRESS NOTES
"Subjective:      Patient ID: Alexandra Rivas is a 48 y.o. female.    Chief Complaint: Follow-up      HPI  Here for follow up of medical problems.  3-4 weeks sinus congestion, not taking flonase, but taking sudafed prn.  No f/c.  Not exercising lately, attributes to busy with work.  No cp/sob/palp.  BMs normal.  Migraines 1-2 per month, relief with excedrin.  Not taking vit D regularly.  No menses x 1yr.    11/18 thyroid u/s:  FINDINGS:  Right lobe measures 5.1 x 1.1 x 1.4 cm.  Left lobe measures 5.0 x 1.2 x 1.1 cm.  Isthmus is 2 points.  Total gland volume is 7.1 mL compared to 13.0 mL previously.    Uniform echotexture throughout the gland with no cystic, solid or complex nodule identified.      Impression       No significant findings.         Updated/ annual due 3/19:  HM: 11/18 today fluvax, 8/10 TDaP, 3/18 MMG, 4/18 Gyn Dr. Acevedo, 11/17 thyroid u/s neg to be repeated yearly.     Review of Systems   Constitutional: Negative for chills, diaphoresis and fever.   Respiratory: Negative for cough and shortness of breath.    Cardiovascular: Negative for chest pain, palpitations and leg swelling.   Gastrointestinal: Negative for blood in stool, constipation, diarrhea, nausea and vomiting.   Genitourinary: Negative for dysuria, frequency and hematuria.   Psychiatric/Behavioral: The patient is not nervous/anxious.          Objective:   /78 (BP Location: Right arm, Patient Position: Sitting)   Pulse 86   Temp 96.4 °F (35.8 °C) (Tympanic)   Ht 5' 5" (1.651 m)   Wt 51 kg (112 lb 7 oz)   SpO2 99%   BMI 18.71 kg/m²     Physical Exam   Constitutional: She is oriented to person, place, and time. She appears well-developed.   HENT:   Right Ear: External ear normal. Tympanic membrane is not injected.   Left Ear: External ear normal. Tympanic membrane is not injected.   Mouth/Throat: Oropharynx is clear and moist.   Eyes: Conjunctivae are normal.   Neck: Neck supple. Carotid bruit is not present. No thyroid " mass and no thyromegaly present.   Cardiovascular: Normal rate, regular rhythm and intact distal pulses. Exam reveals no gallop and no friction rub.   No murmur heard.  Pulmonary/Chest: Effort normal and breath sounds normal. She has no wheezes. She has no rales.   Abdominal: Soft. Bowel sounds are normal. She exhibits no mass. There is no hepatosplenomegaly. There is no tenderness.   Musculoskeletal: She exhibits no edema.   Lymphadenopathy:     She has no cervical adenopathy.   Neurological: She is alert and oriented to person, place, and time.   Psychiatric: She has a normal mood and affect.       Results for orders placed or performed in visit on 11/12/18   Vitamin D   Result Value Ref Range    Vit D, 25-Hydroxy 20 (L) 30 - 96 ng/mL         Assessment:       1. Migraine with aura and without status migrainosus, not intractable    2. FH: thyroid cancer    3. Vitamin D deficiency disease    4. Chronic seasonal allergic rhinitis due to pollen    5. Preventive measure    6. Acute seasonal allergic rhinitis due to pollen          Plan:     Migraine with aura and without status migrainosus, not intractable- cont prn excedrin.    FH: thyroid cancer- u/s neg.    Vitamin D deficiency disease- change back to weekly, recheck 6mo.  -     Vitamin D; Future    Chronic seasonal allergic rhinitis due to pollen- take flonase daily, message me in 2 weeks and let me know how doing.  -     fluticasone (FLONASE) 50 mcg/actuation nasal spray; 2 sprays (100 mcg total) by Each Nare route once daily.  Dispense: 3 Bottle; Refill: 3    Preventive measure- will do in 6mo.  Fluvax.  Restart walking for exercise.  -     CBC auto differential; Future; Expected date: 11/21/2018  -     Comprehensive metabolic panel; Future; Expected date: 11/21/2018  -     Lipid panel; Future; Expected date: 11/21/2018  -     TSH; Future; Expected date: 11/21/2018  -     Vitamin D; Future

## 2018-11-12 ENCOUNTER — HOSPITAL ENCOUNTER (OUTPATIENT)
Dept: RADIOLOGY | Facility: HOSPITAL | Age: 48
Discharge: HOME | End: 2018-11-12
Attending: INTERNAL MEDICINE
Payer: COMMERCIAL

## 2018-11-12 DIAGNOSIS — Z80.8 FH: THYROID CANCER: ICD-10-CM

## 2018-11-12 PROCEDURE — 76536 US EXAM OF HEAD AND NECK: CPT | Mod: TC,PO

## 2018-11-12 PROCEDURE — 76536 US EXAM OF HEAD AND NECK: CPT | Mod: 26,,, | Performed by: RADIOLOGY

## 2018-11-21 ENCOUNTER — OFFICE VISIT (OUTPATIENT)
Dept: INTERNAL MEDICINE | Facility: CLINIC | Age: 48
End: 2018-11-21
Payer: COMMERCIAL

## 2018-11-21 VITALS
OXYGEN SATURATION: 99 % | WEIGHT: 112.44 LBS | TEMPERATURE: 96 F | SYSTOLIC BLOOD PRESSURE: 118 MMHG | HEIGHT: 65 IN | DIASTOLIC BLOOD PRESSURE: 78 MMHG | BODY MASS INDEX: 18.73 KG/M2 | HEART RATE: 86 BPM

## 2018-11-21 DIAGNOSIS — J30.1 ACUTE SEASONAL ALLERGIC RHINITIS DUE TO POLLEN: ICD-10-CM

## 2018-11-21 DIAGNOSIS — Z80.8 FH: THYROID CANCER: ICD-10-CM

## 2018-11-21 DIAGNOSIS — G43.109 MIGRAINE WITH AURA AND WITHOUT STATUS MIGRAINOSUS, NOT INTRACTABLE: Primary | ICD-10-CM

## 2018-11-21 DIAGNOSIS — E55.9 VITAMIN D DEFICIENCY DISEASE: ICD-10-CM

## 2018-11-21 DIAGNOSIS — J30.1 CHRONIC SEASONAL ALLERGIC RHINITIS DUE TO POLLEN: ICD-10-CM

## 2018-11-21 DIAGNOSIS — Z29.9 PREVENTIVE MEASURE: ICD-10-CM

## 2018-11-21 PROCEDURE — 90471 IMMUNIZATION ADMIN: CPT | Mod: S$GLB,,, | Performed by: INTERNAL MEDICINE

## 2018-11-21 PROCEDURE — 90686 IIV4 VACC NO PRSV 0.5 ML IM: CPT | Mod: S$GLB,,, | Performed by: INTERNAL MEDICINE

## 2018-11-21 PROCEDURE — 99214 OFFICE O/P EST MOD 30 MIN: CPT | Mod: 25,S$GLB,, | Performed by: INTERNAL MEDICINE

## 2018-11-21 PROCEDURE — 99999 PR PBB SHADOW E&M-EST. PATIENT-LVL IV: CPT | Mod: PBBFAC,,, | Performed by: INTERNAL MEDICINE

## 2018-11-21 PROCEDURE — 3008F BODY MASS INDEX DOCD: CPT | Mod: CPTII,S$GLB,, | Performed by: INTERNAL MEDICINE

## 2018-11-21 RX ORDER — ERGOCALCIFEROL 1.25 MG/1
50000 CAPSULE ORAL WEEKLY
Qty: 12 CAPSULE | Refills: 4 | Status: SHIPPED | OUTPATIENT
Start: 2018-11-21 | End: 2018-12-21

## 2018-11-21 RX ORDER — FLUTICASONE PROPIONATE 50 MCG
2 SPRAY, SUSPENSION (ML) NASAL DAILY
Qty: 3 BOTTLE | Refills: 3 | Status: SHIPPED | OUTPATIENT
Start: 2018-11-21 | End: 2020-05-19 | Stop reason: SDUPTHER

## 2019-02-05 ENCOUNTER — OFFICE VISIT (OUTPATIENT)
Dept: INTERNAL MEDICINE | Facility: CLINIC | Age: 49
End: 2019-02-05
Payer: COMMERCIAL

## 2019-02-05 VITALS
HEART RATE: 68 BPM | HEIGHT: 65 IN | SYSTOLIC BLOOD PRESSURE: 112 MMHG | DIASTOLIC BLOOD PRESSURE: 84 MMHG | TEMPERATURE: 98 F | OXYGEN SATURATION: 95 % | BODY MASS INDEX: 18.37 KG/M2 | WEIGHT: 110.25 LBS

## 2019-02-05 DIAGNOSIS — D50.0 IRON DEFICIENCY ANEMIA DUE TO CHRONIC BLOOD LOSS: ICD-10-CM

## 2019-02-05 DIAGNOSIS — J01.90 ACUTE SINUSITIS, RECURRENCE NOT SPECIFIED, UNSPECIFIED LOCATION: Primary | ICD-10-CM

## 2019-02-05 DIAGNOSIS — E55.9 VITAMIN D DEFICIENCY DISEASE: ICD-10-CM

## 2019-02-05 PROCEDURE — 3008F PR BODY MASS INDEX (BMI) DOCUMENTED: ICD-10-PCS | Mod: CPTII,S$GLB,, | Performed by: PHYSICIAN ASSISTANT

## 2019-02-05 PROCEDURE — 99999 PR PBB SHADOW E&M-EST. PATIENT-LVL III: CPT | Mod: PBBFAC,,, | Performed by: PHYSICIAN ASSISTANT

## 2019-02-05 PROCEDURE — 99999 PR PBB SHADOW E&M-EST. PATIENT-LVL III: ICD-10-PCS | Mod: PBBFAC,,, | Performed by: PHYSICIAN ASSISTANT

## 2019-02-05 PROCEDURE — 96372 THER/PROPH/DIAG INJ SC/IM: CPT | Mod: S$GLB,,, | Performed by: PHYSICIAN ASSISTANT

## 2019-02-05 PROCEDURE — 99213 OFFICE O/P EST LOW 20 MIN: CPT | Mod: 25,S$GLB,, | Performed by: PHYSICIAN ASSISTANT

## 2019-02-05 PROCEDURE — 96372 PR INJECTION,THERAP/PROPH/DIAG2ST, IM OR SUBCUT: ICD-10-PCS | Mod: S$GLB,,, | Performed by: PHYSICIAN ASSISTANT

## 2019-02-05 PROCEDURE — 3008F BODY MASS INDEX DOCD: CPT | Mod: CPTII,S$GLB,, | Performed by: PHYSICIAN ASSISTANT

## 2019-02-05 PROCEDURE — 99213 PR OFFICE/OUTPT VISIT, EST, LEVL III, 20-29 MIN: ICD-10-PCS | Mod: 25,S$GLB,, | Performed by: PHYSICIAN ASSISTANT

## 2019-02-05 RX ORDER — METHYLPREDNISOLONE ACETATE 40 MG/ML
60 INJECTION, SUSPENSION INTRA-ARTICULAR; INTRALESIONAL; INTRAMUSCULAR; SOFT TISSUE
Status: COMPLETED | OUTPATIENT
Start: 2019-02-05 | End: 2019-02-05

## 2019-02-05 RX ORDER — AMOXICILLIN 875 MG/1
875 TABLET, FILM COATED ORAL EVERY 12 HOURS
Qty: 20 TABLET | Refills: 0 | Status: SHIPPED | OUTPATIENT
Start: 2019-02-05 | End: 2019-02-15

## 2019-02-05 RX ADMIN — METHYLPREDNISOLONE ACETATE 60 MG: 40 INJECTION, SUSPENSION INTRA-ARTICULAR; INTRALESIONAL; INTRAMUSCULAR; SOFT TISSUE at 09:02

## 2019-02-05 NOTE — PROGRESS NOTES
"Subjective:       Patient ID: Alexandra Rivas is a 48 y.o. female.    Chief Complaint: Sinus Problem    48 year old female c/o nasal congestion, sinus pressure, R ear congestion with intermittent pain, and post-nasal drainage X more than one week. Pt is new to me. She reports sxs worsened a few days ago with mild myalgias and radiation of sinus pain into upper teeth. She had one episode of nausea with slight vomiting (since resolved). She reports no fever, chills, rash, N/V, CP, SOB, cough, sore throat, rhinorrhea, or other medical complaints. She has taken OTC cold medication with short-term improvement of sxs.    PCP: Dr. Moran    Patient Active Problem List:     Stress incontinence     Migraine with aura and without status migrainosus, not intractable     FH: thyroid cancer     Vitamin D deficiency disease     Myofascial pain     Gastritis and duodenitis     Iron deficiency anemia     Chronic seasonal allergic rhinitis due to pollen     Iron deficiency anemia due to chronic blood loss      Review of Systems   Constitutional: Negative for chills and fever.   HENT: Positive for congestion, ear pain and postnasal drip. Negative for rhinorrhea and sore throat.    Respiratory: Negative for cough and shortness of breath.    Cardiovascular: Negative for chest pain, palpitations and leg swelling.   Gastrointestinal: Negative for nausea and vomiting.   Skin: Negative for rash.   Neurological: Negative for dizziness, weakness, numbness and headaches.   Psychiatric/Behavioral: Negative for confusion.       Objective:       Vitals:    02/05/19 0858   BP: 112/84   BP Location: Left arm   Patient Position: Sitting   BP Method: Small (Manual)   Pulse: 68   Temp: 98 °F (36.7 °C)   TempSrc: Tympanic   SpO2: 95%   Weight: 50 kg (110 lb 3.7 oz)   Height: 5' 5" (1.651 m)     Physical Exam   Constitutional: She is oriented to person, place, and time. She appears well-developed and well-nourished. No distress.   HENT: "   Head: Normocephalic and atraumatic.   Right Ear: Ear canal normal. A middle ear effusion is present.   Left Ear: Tympanic membrane and ear canal normal.   Nose: Right sinus exhibits maxillary sinus tenderness and frontal sinus tenderness. Left sinus exhibits no maxillary sinus tenderness and no frontal sinus tenderness.   Mouth/Throat: Oropharynx is clear and moist. No oropharyngeal exudate.   Sinus drainage at posterior pharynx   Eyes: EOM are normal. No scleral icterus.   Neck: Neck supple.   Cardiovascular: Normal rate and regular rhythm.   Pulmonary/Chest: Effort normal and breath sounds normal. No respiratory distress. She has no decreased breath sounds. She has no wheezes. She has no rhonchi. She has no rales.   Musculoskeletal: Normal range of motion. She exhibits no edema.   Lymphadenopathy:        Head (right side): Tonsillar (TTP without swelling) adenopathy present.        Head (left side): Tonsillar (TTP without swelling) adenopathy present.   Neurological: She is alert and oriented to person, place, and time. No cranial nerve deficit.   Skin: Skin is warm and dry. No rash noted.   Psychiatric: She has a normal mood and affect. Her speech is normal and behavior is normal. Thought content normal.       Component      Latest Ref Rng & Units 11/12/2018 5/21/2018   WBC      3.90 - 12.70 K/uL  4.06   RBC      4.00 - 5.40 M/uL  4.61   Hemoglobin      12.0 - 16.0 g/dL  12.8   Hematocrit      37.0 - 48.5 %  40.4   MCV      82 - 98 fL  88   MCH      27.0 - 31.0 pg  27.8   MCHC      32.0 - 36.0 g/dL  31.7 (L)   RDW      11.5 - 14.5 %  12.8   Platelets      150 - 350 K/uL  231   MPV      9.2 - 12.9 fL  9.8   Gran # (ANC)      1.8 - 7.7 K/uL  2.2   Lymph #      1.0 - 4.8 K/uL  1.6   Mono #      0.3 - 1.0 K/uL  0.2 (L)   Eos #      0.0 - 0.5 K/uL  0.1   Baso #      0.00 - 0.20 K/uL  0.02   Gran%      38.0 - 73.0 %  53.2   Lymph%      18.0 - 48.0 %  40.4   Mono%      4.0 - 15.0 %  4.4   Eosinophil%      0.0 - 8.0 %   1.5   Basophil%      0.0 - 1.9 %  0.5   Differential Method        Automated   Sodium      136 - 145 mmol/L  142   Potassium      3.5 - 5.1 mmol/L  3.8   Chloride      95 - 110 mmol/L  104   CO2      23 - 29 mmol/L  28   Glucose      70 - 110 mg/dL  69 (L)   BUN, Bld      6 - 20 mg/dL  11   Creatinine      0.5 - 1.4 mg/dL  0.9   Calcium      8.7 - 10.5 mg/dL  9.9   Total Protein      6.0 - 8.4 g/dL  7.1   Albumin      3.5 - 5.2 g/dL  4.1   Total Bilirubin      0.1 - 1.0 mg/dL  0.9   Alkaline Phosphatase      55 - 135 U/L  46 (L)   AST      10 - 40 U/L  13   ALT      10 - 44 U/L  6 (L)   Anion Gap      8 - 16 mmol/L  10   eGFR if African American      >60 mL/min/1.73 m:2  >60   eGFR if non African American      >60 mL/min/1.73 m:2  >60   Vit D, 25-Hydroxy      30 - 96 ng/mL 20 (L)      Assessment:       1. Acute sinusitis, recurrence not specified, unspecified location    2. Iron deficiency anemia due to chronic blood loss    3. Vitamin D deficiency disease        Plan:         Alexandra was seen today for sinus problem.    Diagnoses and all orders for this visit:    Acute sinusitis, recurrence not specified, unspecified location  -     amoxicillin (AMOXIL) 875 MG tablet; Take 1 tablet (875 mg total) by mouth every 12 (twelve) hours. for 10 days  -     methylPREDNISolone acetate injection 60 mg  Steroid shot today. Amoxicillin X 10 days. Flonase or saline nasal spray PRN. Monitor sxs and RTC if sxs persist or worsen.    Iron deficiency anemia due to chronic blood loss    Vitamin D deficiency disease    Follow-up with your PCP as scheduled in 3 months for health management.

## 2019-05-08 NOTE — PROGRESS NOTES
"Subjective:      Patient ID: Alexandra Rivas is a 48 y.o. female.    Chief Complaint: Annual Exam      HPI  Here for f/u medical problems and preventive exam.  No menses x 2 years.  Having some hot flashes during the night, and "jittery feeling" in chest.  Vit E did not help.  Amitrip does help, when she takes it.  Painful intercourse despite lubricants.  Not exercising lately.  No f/c/sw/cough.  No cp/sob/palp.  BMs and urine normal.  Not taking vit D regularly.  GERD doing well on rx.    HM: 11/18 fluvax, 8/10 TDaP, 3/18 MMG, 4/18 Gyn Dr. Acevedo, 11/18 thyroid u/s neg to be repeated yearly.     Review of Systems   Constitutional: Negative for activity change, appetite change, chills, diaphoresis, fever and unexpected weight change.   HENT: Negative for congestion, ear pain, hearing loss, rhinorrhea, sinus pressure, sore throat and trouble swallowing.    Eyes: Negative for discharge and visual disturbance.   Respiratory: Negative for cough, chest tightness, shortness of breath and wheezing.    Cardiovascular: Positive for palpitations. Negative for chest pain.   Gastrointestinal: Negative for blood in stool, constipation, diarrhea, nausea and vomiting.   Endocrine: Negative for polydipsia and polyuria.   Genitourinary: Negative for difficulty urinating, dysuria, frequency, hematuria, menstrual problem, urgency and vaginal discharge.   Musculoskeletal: Positive for arthralgias. Negative for joint swelling and neck pain.   Skin: Negative for rash.   Neurological: Negative for dizziness, weakness and headaches.   Psychiatric/Behavioral: Negative for confusion, dysphoric mood and sleep disturbance. The patient is not nervous/anxious.          Objective:   /74 (BP Location: Right arm, Patient Position: Sitting, BP Method: Medium (Manual))   Pulse 76   Temp 98 °F (36.7 °C) (Oral)   Ht 5' 5" (1.651 m)   Wt 49.5 kg (109 lb 2 oz)   SpO2 100%   BMI 18.16 kg/m²     Physical Exam   Constitutional: She is " oriented to person, place, and time. She appears well-developed and well-nourished.   HENT:   Right Ear: External ear normal. Tympanic membrane is not injected.   Left Ear: External ear normal. Tympanic membrane is not injected.   Mouth/Throat: Oropharynx is clear and moist.   Eyes: Conjunctivae are normal.   Neck: Normal range of motion. Neck supple. No thyromegaly present.   Cardiovascular: Normal rate, regular rhythm and intact distal pulses. Exam reveals no gallop and no friction rub.   No murmur heard.  Pulmonary/Chest: Effort normal and breath sounds normal. She has no wheezes. She has no rales.   Abdominal: Soft. Bowel sounds are normal. She exhibits no mass. There is no tenderness.   Musculoskeletal: She exhibits no edema.   Lymphadenopathy:     She has no cervical adenopathy.   Neurological: She is alert and oriented to person, place, and time.   Skin: Skin is warm. No rash noted.   Psychiatric: She has a normal mood and affect.       Results for orders placed or performed in visit on 05/14/19   CBC auto differential   Result Value Ref Range    WBC 5.34 3.90 - 12.70 K/uL    RBC 4.66 4.00 - 5.40 M/uL    Hemoglobin 12.6 12.0 - 16.0 g/dL    Hematocrit 40.9 37.0 - 48.5 %    Mean Corpuscular Volume 88 82 - 98 fL    Mean Corpuscular Hemoglobin 27.0 27.0 - 31.0 pg    Mean Corpuscular Hemoglobin Conc 30.8 (L) 32.0 - 36.0 g/dL    RDW 13.0 11.5 - 14.5 %    Platelets 230 150 - 350 K/uL    MPV 9.7 9.2 - 12.9 fL    Immature Granulocytes 0.0 0.0 - 0.5 %    Gran # (ANC) 3.0 1.8 - 7.7 K/uL    Immature Grans (Abs) 0.00 0.00 - 0.04 K/uL    Lymph # 1.9 1.0 - 4.8 K/uL    Mono # 0.4 0.3 - 1.0 K/uL    Eos # 0.1 0.0 - 0.5 K/uL    Baso # 0.04 0.00 - 0.20 K/uL    nRBC 0 0 /100 WBC    Gran% 55.2 38.0 - 73.0 %    Lymph% 35.6 18.0 - 48.0 %    Mono% 6.6 4.0 - 15.0 %    Eosinophil% 1.9 0.0 - 8.0 %    Basophil% 0.7 0.0 - 1.9 %    Differential Method Automated    Comprehensive metabolic panel   Result Value Ref Range    Sodium 141 136 -  145 mmol/L    Potassium 3.8 3.5 - 5.1 mmol/L    Chloride 105 95 - 110 mmol/L    CO2 26 23 - 29 mmol/L    Glucose 84 70 - 110 mg/dL    BUN, Bld 14 6 - 20 mg/dL    Creatinine 0.9 0.5 - 1.4 mg/dL    Calcium 9.8 8.7 - 10.5 mg/dL    Total Protein 7.2 6.0 - 8.4 g/dL    Albumin 4.0 3.5 - 5.2 g/dL    Total Bilirubin 1.0 0.1 - 1.0 mg/dL    Alkaline Phosphatase 52 (L) 55 - 135 U/L    AST 16 10 - 40 U/L    ALT 7 (L) 10 - 44 U/L    Anion Gap 10 8 - 16 mmol/L    eGFR if African American >60 >60 mL/min/1.73 m^2    eGFR if non African American >60 >60 mL/min/1.73 m^2   Ferritin   Result Value Ref Range    Ferritin 324 (H) 20.0 - 300.0 ng/mL   Iron and TIBC   Result Value Ref Range    Iron 58 30 - 160 ug/dL    Transferrin 225 200 - 375 mg/dL    TIBC 333 250 - 450 ug/dL    Saturated Iron 17 (L) 20 - 50 %   Reticulocytes   Result Value Ref Range    Retic 0.8 0.5 - 2.5 %   Lactate dehydrogenase   Result Value Ref Range     110 - 260 U/L   CBC auto differential   Result Value Ref Range    WBC 5.34 3.90 - 12.70 K/uL    RBC 4.66 4.00 - 5.40 M/uL    Hemoglobin 12.6 12.0 - 16.0 g/dL    Hematocrit 40.9 37.0 - 48.5 %    Mean Corpuscular Volume 88 82 - 98 fL    Mean Corpuscular Hemoglobin 27.0 27.0 - 31.0 pg    Mean Corpuscular Hemoglobin Conc 30.8 (L) 32.0 - 36.0 g/dL    RDW 13.0 11.5 - 14.5 %    Platelets 230 150 - 350 K/uL    MPV 9.7 9.2 - 12.9 fL    Immature Granulocytes 0.0 0.0 - 0.5 %    Gran # (ANC) 3.0 1.8 - 7.7 K/uL    Immature Grans (Abs) 0.00 0.00 - 0.04 K/uL    Lymph # 1.9 1.0 - 4.8 K/uL    Mono # 0.4 0.3 - 1.0 K/uL    Eos # 0.1 0.0 - 0.5 K/uL    Baso # 0.04 0.00 - 0.20 K/uL    nRBC 0 0 /100 WBC    Gran% 55.2 38.0 - 73.0 %    Lymph% 35.6 18.0 - 48.0 %    Mono% 6.6 4.0 - 15.0 %    Eosinophil% 1.9 0.0 - 8.0 %    Basophil% 0.7 0.0 - 1.9 %    Differential Method Automated    Comprehensive metabolic panel   Result Value Ref Range    Sodium 141 136 - 145 mmol/L    Potassium 3.8 3.5 - 5.1 mmol/L    Chloride 105 95 - 110 mmol/L     CO2 26 23 - 29 mmol/L    Glucose 84 70 - 110 mg/dL    BUN, Bld 14 6 - 20 mg/dL    Creatinine 0.9 0.5 - 1.4 mg/dL    Calcium 9.8 8.7 - 10.5 mg/dL    Total Protein 7.2 6.0 - 8.4 g/dL    Albumin 4.0 3.5 - 5.2 g/dL    Total Bilirubin 1.0 0.1 - 1.0 mg/dL    Alkaline Phosphatase 52 (L) 55 - 135 U/L    AST 16 10 - 40 U/L    ALT 7 (L) 10 - 44 U/L    Anion Gap 10 8 - 16 mmol/L    eGFR if African American >60 >60 mL/min/1.73 m^2    eGFR if non African American >60 >60 mL/min/1.73 m^2   Lipid panel   Result Value Ref Range    Cholesterol 223 (H) 120 - 199 mg/dL    Triglycerides 62 30 - 150 mg/dL    HDL 69 40 - 75 mg/dL    LDL Cholesterol 141.6 63.0 - 159.0 mg/dL    Hdl/Cholesterol Ratio 30.9 20.0 - 50.0 %    Total Cholesterol/HDL Ratio 3.2 2.0 - 5.0    Non-HDL Cholesterol 154 mg/dL   TSH   Result Value Ref Range    TSH 1.558 0.400 - 4.000 uIU/mL   Vitamin D   Result Value Ref Range    Vit D, 25-Hydroxy 17 (L) 30 - 96 ng/mL         Assessment:       1. Encounter for preventive health examination    2. Vitamin D deficiency disease    3. Migraine with aura and without status migrainosus, not intractable    4. FH: thyroid cancer    5. Other chest pain    6. Gastroesophageal reflux disease, esophagitis presence not specified          Plan:     Alexandra was seen today for annual exam.    Diagnoses and all orders for this visit:    Encounter for preventive health examination- utd.  -     Mammo Digital Screening Bilat; Future  -     Ambulatory consult to Gynecology    Vitamin D deficiency disease- restart weekly, recheck 6mo.  -     ergocalciferol (ERGOCALCIFEROL) 50,000 unit Cap; Take 1 capsule (50,000 Units total) by mouth once a week.  -     Vitamin D; Future    Migraine with aura and without status migrainosus, not intractable- doing well     FH: thyroid cancer- recheck 6mo.  -     US Soft Tissue Head Neck Thyroid; Future    FLonase daily for tinnitis, call if not resolved.

## 2019-05-14 ENCOUNTER — LAB VISIT (OUTPATIENT)
Dept: LAB | Facility: HOSPITAL | Age: 49
End: 2019-05-14
Payer: COMMERCIAL

## 2019-05-14 DIAGNOSIS — D50.0 IRON DEFICIENCY ANEMIA DUE TO CHRONIC BLOOD LOSS: ICD-10-CM

## 2019-05-14 DIAGNOSIS — Z29.9 PREVENTIVE MEASURE: ICD-10-CM

## 2019-05-14 DIAGNOSIS — E55.9 VITAMIN D DEFICIENCY DISEASE: ICD-10-CM

## 2019-05-14 LAB
25(OH)D3+25(OH)D2 SERPL-MCNC: 17 NG/ML (ref 30–96)
ALBUMIN SERPL BCP-MCNC: 4 G/DL (ref 3.5–5.2)
ALBUMIN SERPL BCP-MCNC: 4 G/DL (ref 3.5–5.2)
ALP SERPL-CCNC: 52 U/L (ref 55–135)
ALP SERPL-CCNC: 52 U/L (ref 55–135)
ALT SERPL W/O P-5'-P-CCNC: 7 U/L (ref 10–44)
ALT SERPL W/O P-5'-P-CCNC: 7 U/L (ref 10–44)
ANION GAP SERPL CALC-SCNC: 10 MMOL/L (ref 8–16)
ANION GAP SERPL CALC-SCNC: 10 MMOL/L (ref 8–16)
AST SERPL-CCNC: 16 U/L (ref 10–40)
AST SERPL-CCNC: 16 U/L (ref 10–40)
BASOPHILS # BLD AUTO: 0.04 K/UL (ref 0–0.2)
BASOPHILS # BLD AUTO: 0.04 K/UL (ref 0–0.2)
BASOPHILS NFR BLD: 0.7 % (ref 0–1.9)
BASOPHILS NFR BLD: 0.7 % (ref 0–1.9)
BILIRUB SERPL-MCNC: 1 MG/DL (ref 0.1–1)
BILIRUB SERPL-MCNC: 1 MG/DL (ref 0.1–1)
BUN SERPL-MCNC: 14 MG/DL (ref 6–20)
BUN SERPL-MCNC: 14 MG/DL (ref 6–20)
CALCIUM SERPL-MCNC: 9.8 MG/DL (ref 8.7–10.5)
CALCIUM SERPL-MCNC: 9.8 MG/DL (ref 8.7–10.5)
CHLORIDE SERPL-SCNC: 105 MMOL/L (ref 95–110)
CHLORIDE SERPL-SCNC: 105 MMOL/L (ref 95–110)
CHOLEST SERPL-MCNC: 223 MG/DL (ref 120–199)
CHOLEST/HDLC SERPL: 3.2 {RATIO} (ref 2–5)
CO2 SERPL-SCNC: 26 MMOL/L (ref 23–29)
CO2 SERPL-SCNC: 26 MMOL/L (ref 23–29)
CREAT SERPL-MCNC: 0.9 MG/DL (ref 0.5–1.4)
CREAT SERPL-MCNC: 0.9 MG/DL (ref 0.5–1.4)
DIFFERENTIAL METHOD: ABNORMAL
DIFFERENTIAL METHOD: ABNORMAL
EOSINOPHIL # BLD AUTO: 0.1 K/UL (ref 0–0.5)
EOSINOPHIL # BLD AUTO: 0.1 K/UL (ref 0–0.5)
EOSINOPHIL NFR BLD: 1.9 % (ref 0–8)
EOSINOPHIL NFR BLD: 1.9 % (ref 0–8)
ERYTHROCYTE [DISTWIDTH] IN BLOOD BY AUTOMATED COUNT: 13 % (ref 11.5–14.5)
ERYTHROCYTE [DISTWIDTH] IN BLOOD BY AUTOMATED COUNT: 13 % (ref 11.5–14.5)
EST. GFR  (AFRICAN AMERICAN): >60 ML/MIN/1.73 M^2
EST. GFR  (AFRICAN AMERICAN): >60 ML/MIN/1.73 M^2
EST. GFR  (NON AFRICAN AMERICAN): >60 ML/MIN/1.73 M^2
EST. GFR  (NON AFRICAN AMERICAN): >60 ML/MIN/1.73 M^2
FERRITIN SERPL-MCNC: 324 NG/ML (ref 20–300)
GLUCOSE SERPL-MCNC: 84 MG/DL (ref 70–110)
GLUCOSE SERPL-MCNC: 84 MG/DL (ref 70–110)
HCT VFR BLD AUTO: 40.9 % (ref 37–48.5)
HCT VFR BLD AUTO: 40.9 % (ref 37–48.5)
HDLC SERPL-MCNC: 69 MG/DL (ref 40–75)
HDLC SERPL: 30.9 % (ref 20–50)
HGB BLD-MCNC: 12.6 G/DL (ref 12–16)
HGB BLD-MCNC: 12.6 G/DL (ref 12–16)
IMM GRANULOCYTES # BLD AUTO: 0 K/UL (ref 0–0.04)
IMM GRANULOCYTES # BLD AUTO: 0 K/UL (ref 0–0.04)
IMM GRANULOCYTES NFR BLD AUTO: 0 % (ref 0–0.5)
IMM GRANULOCYTES NFR BLD AUTO: 0 % (ref 0–0.5)
IRON SERPL-MCNC: 58 UG/DL (ref 30–160)
LDH SERPL L TO P-CCNC: 153 U/L (ref 110–260)
LDLC SERPL CALC-MCNC: 141.6 MG/DL (ref 63–159)
LYMPHOCYTES # BLD AUTO: 1.9 K/UL (ref 1–4.8)
LYMPHOCYTES # BLD AUTO: 1.9 K/UL (ref 1–4.8)
LYMPHOCYTES NFR BLD: 35.6 % (ref 18–48)
LYMPHOCYTES NFR BLD: 35.6 % (ref 18–48)
MCH RBC QN AUTO: 27 PG (ref 27–31)
MCH RBC QN AUTO: 27 PG (ref 27–31)
MCHC RBC AUTO-ENTMCNC: 30.8 G/DL (ref 32–36)
MCHC RBC AUTO-ENTMCNC: 30.8 G/DL (ref 32–36)
MCV RBC AUTO: 88 FL (ref 82–98)
MCV RBC AUTO: 88 FL (ref 82–98)
MONOCYTES # BLD AUTO: 0.4 K/UL (ref 0.3–1)
MONOCYTES # BLD AUTO: 0.4 K/UL (ref 0.3–1)
MONOCYTES NFR BLD: 6.6 % (ref 4–15)
MONOCYTES NFR BLD: 6.6 % (ref 4–15)
NEUTROPHILS # BLD AUTO: 3 K/UL (ref 1.8–7.7)
NEUTROPHILS # BLD AUTO: 3 K/UL (ref 1.8–7.7)
NEUTROPHILS NFR BLD: 55.2 % (ref 38–73)
NEUTROPHILS NFR BLD: 55.2 % (ref 38–73)
NONHDLC SERPL-MCNC: 154 MG/DL
NRBC BLD-RTO: 0 /100 WBC
NRBC BLD-RTO: 0 /100 WBC
PLATELET # BLD AUTO: 230 K/UL (ref 150–350)
PLATELET # BLD AUTO: 230 K/UL (ref 150–350)
PMV BLD AUTO: 9.7 FL (ref 9.2–12.9)
PMV BLD AUTO: 9.7 FL (ref 9.2–12.9)
POTASSIUM SERPL-SCNC: 3.8 MMOL/L (ref 3.5–5.1)
POTASSIUM SERPL-SCNC: 3.8 MMOL/L (ref 3.5–5.1)
PROT SERPL-MCNC: 7.2 G/DL (ref 6–8.4)
PROT SERPL-MCNC: 7.2 G/DL (ref 6–8.4)
RBC # BLD AUTO: 4.66 M/UL (ref 4–5.4)
RBC # BLD AUTO: 4.66 M/UL (ref 4–5.4)
RETICS/RBC NFR AUTO: 0.8 % (ref 0.5–2.5)
SATURATED IRON: 17 % (ref 20–50)
SODIUM SERPL-SCNC: 141 MMOL/L (ref 136–145)
SODIUM SERPL-SCNC: 141 MMOL/L (ref 136–145)
TOTAL IRON BINDING CAPACITY: 333 UG/DL (ref 250–450)
TRANSFERRIN SERPL-MCNC: 225 MG/DL (ref 200–375)
TRIGL SERPL-MCNC: 62 MG/DL (ref 30–150)
TSH SERPL DL<=0.005 MIU/L-ACNC: 1.56 UIU/ML (ref 0.4–4)
WBC # BLD AUTO: 5.34 K/UL (ref 3.9–12.7)
WBC # BLD AUTO: 5.34 K/UL (ref 3.9–12.7)

## 2019-05-14 PROCEDURE — 85045 AUTOMATED RETICULOCYTE COUNT: CPT

## 2019-05-14 PROCEDURE — 80061 LIPID PANEL: CPT

## 2019-05-14 PROCEDURE — 82728 ASSAY OF FERRITIN: CPT

## 2019-05-14 PROCEDURE — 82306 VITAMIN D 25 HYDROXY: CPT

## 2019-05-14 PROCEDURE — 80053 COMPREHEN METABOLIC PANEL: CPT

## 2019-05-14 PROCEDURE — 85025 COMPLETE CBC W/AUTO DIFF WBC: CPT

## 2019-05-14 PROCEDURE — 36415 COLL VENOUS BLD VENIPUNCTURE: CPT

## 2019-05-14 PROCEDURE — 84443 ASSAY THYROID STIM HORMONE: CPT

## 2019-05-14 PROCEDURE — 83540 ASSAY OF IRON: CPT

## 2019-05-14 PROCEDURE — 83615 LACTATE (LD) (LDH) ENZYME: CPT

## 2019-05-21 ENCOUNTER — OFFICE VISIT (OUTPATIENT)
Dept: HEMATOLOGY/ONCOLOGY | Facility: CLINIC | Age: 49
End: 2019-05-21
Payer: COMMERCIAL

## 2019-05-21 ENCOUNTER — HOSPITAL ENCOUNTER (OUTPATIENT)
Dept: RADIOLOGY | Facility: HOSPITAL | Age: 49
Discharge: HOME | End: 2019-05-21
Attending: INTERNAL MEDICINE
Payer: COMMERCIAL

## 2019-05-21 ENCOUNTER — OFFICE VISIT (OUTPATIENT)
Dept: FAMILY MEDICINE | Facility: CLINIC | Age: 49
End: 2019-05-21
Payer: COMMERCIAL

## 2019-05-21 VITALS
DIASTOLIC BLOOD PRESSURE: 74 MMHG | TEMPERATURE: 98 F | HEART RATE: 78 BPM | OXYGEN SATURATION: 100 % | WEIGHT: 109.13 LBS | HEIGHT: 65 IN | BODY MASS INDEX: 18.18 KG/M2 | TEMPERATURE: 98 F | SYSTOLIC BLOOD PRESSURE: 118 MMHG | BODY MASS INDEX: 18.18 KG/M2 | WEIGHT: 109.13 LBS | SYSTOLIC BLOOD PRESSURE: 108 MMHG | HEART RATE: 76 BPM | OXYGEN SATURATION: 99 % | HEIGHT: 65 IN | DIASTOLIC BLOOD PRESSURE: 82 MMHG

## 2019-05-21 DIAGNOSIS — Z80.8 FH: THYROID CANCER: ICD-10-CM

## 2019-05-21 DIAGNOSIS — K21.9 GASTROESOPHAGEAL REFLUX DISEASE, ESOPHAGITIS PRESENCE NOT SPECIFIED: ICD-10-CM

## 2019-05-21 DIAGNOSIS — Z00.00 ENCOUNTER FOR PREVENTIVE HEALTH EXAMINATION: ICD-10-CM

## 2019-05-21 DIAGNOSIS — R07.89 OTHER CHEST PAIN: ICD-10-CM

## 2019-05-21 DIAGNOSIS — Z00.00 ENCOUNTER FOR PREVENTIVE HEALTH EXAMINATION: Primary | ICD-10-CM

## 2019-05-21 DIAGNOSIS — E55.9 VITAMIN D DEFICIENCY DISEASE: ICD-10-CM

## 2019-05-21 DIAGNOSIS — D50.0 IRON DEFICIENCY ANEMIA DUE TO CHRONIC BLOOD LOSS: Primary | ICD-10-CM

## 2019-05-21 DIAGNOSIS — G43.109 MIGRAINE WITH AURA AND WITHOUT STATUS MIGRAINOSUS, NOT INTRACTABLE: ICD-10-CM

## 2019-05-21 PROCEDURE — 3008F BODY MASS INDEX DOCD: CPT | Mod: CPTII,S$GLB,, | Performed by: NURSE PRACTITIONER

## 2019-05-21 PROCEDURE — 3008F PR BODY MASS INDEX (BMI) DOCUMENTED: ICD-10-PCS | Mod: CPTII,S$GLB,, | Performed by: NURSE PRACTITIONER

## 2019-05-21 PROCEDURE — 99396 PREV VISIT EST AGE 40-64: CPT | Mod: S$GLB,,, | Performed by: INTERNAL MEDICINE

## 2019-05-21 PROCEDURE — 77063 MAMMO DIGITAL SCREENING BILAT WITH TOMOSYNTHESIS_CAD: ICD-10-PCS | Mod: 26,,, | Performed by: RADIOLOGY

## 2019-05-21 PROCEDURE — 99396 PR PREVENTIVE VISIT,EST,40-64: ICD-10-PCS | Mod: S$GLB,,, | Performed by: INTERNAL MEDICINE

## 2019-05-21 PROCEDURE — 77067 SCR MAMMO BI INCL CAD: CPT | Mod: 26,,, | Performed by: RADIOLOGY

## 2019-05-21 PROCEDURE — 99214 PR OFFICE/OUTPT VISIT, EST, LEVL IV, 30-39 MIN: ICD-10-PCS | Mod: S$GLB,,, | Performed by: NURSE PRACTITIONER

## 2019-05-21 PROCEDURE — 99999 PR PBB SHADOW E&M-EST. PATIENT-LVL III: CPT | Mod: PBBFAC,,, | Performed by: NURSE PRACTITIONER

## 2019-05-21 PROCEDURE — 99999 PR PBB SHADOW E&M-EST. PATIENT-LVL IV: ICD-10-PCS | Mod: PBBFAC,,, | Performed by: INTERNAL MEDICINE

## 2019-05-21 PROCEDURE — 77067 MAMMO DIGITAL SCREENING BILAT WITH TOMOSYNTHESIS_CAD: ICD-10-PCS | Mod: 26,,, | Performed by: RADIOLOGY

## 2019-05-21 PROCEDURE — 77067 SCR MAMMO BI INCL CAD: CPT | Mod: TC

## 2019-05-21 PROCEDURE — 77063 BREAST TOMOSYNTHESIS BI: CPT | Mod: 26,,, | Performed by: RADIOLOGY

## 2019-05-21 PROCEDURE — 99999 PR PBB SHADOW E&M-EST. PATIENT-LVL III: ICD-10-PCS | Mod: PBBFAC,,, | Performed by: NURSE PRACTITIONER

## 2019-05-21 PROCEDURE — 99999 PR PBB SHADOW E&M-EST. PATIENT-LVL IV: CPT | Mod: PBBFAC,,, | Performed by: INTERNAL MEDICINE

## 2019-05-21 PROCEDURE — 99214 OFFICE O/P EST MOD 30 MIN: CPT | Mod: S$GLB,,, | Performed by: NURSE PRACTITIONER

## 2019-05-21 RX ORDER — ERGOCALCIFEROL 1.25 MG/1
50000 CAPSULE ORAL WEEKLY
Qty: 4 CAPSULE | Refills: 4 | Status: SHIPPED | OUTPATIENT
Start: 2019-05-21 | End: 2019-06-20

## 2019-05-21 RX ORDER — OMEPRAZOLE 40 MG/1
40 CAPSULE, DELAYED RELEASE ORAL DAILY
Qty: 90 CAPSULE | Refills: 3 | Status: SHIPPED | OUTPATIENT
Start: 2019-05-21 | End: 2022-01-31 | Stop reason: SDUPTHER

## 2019-05-21 RX ORDER — IRON,CARBONYL/ASCORBIC ACID 100-250 MG
1 TABLET ORAL DAILY
Qty: 60 TABLET | Refills: 1 | Status: SHIPPED | OUTPATIENT
Start: 2019-05-21 | End: 2019-09-18

## 2019-05-21 NOTE — PATIENT INSTRUCTIONS
Iron Supplements  Most people think of iron as a metal that is used to make pots, frying pans, and soup kettles. This same metal (or mineral) also plays a vital role in the body. Iron helps the blood cells carry oxygen. When you dont get enough iron, you may feel tired and lack energy. Over time, without enough iron, your body makes fewer red blood cells. This can cause a condition called iron-deficiency anemia. Since your body doesnt make iron, you must get it from foods or supplements.     Food sources of iron  Iron is found in a few types of foods. Good sources include:  · Red meat, poultry, fish, eggs  · Dried fruits (especially raisins, prunes, figs)  · Legumes such as dried beans and lentils  · Breads and cereals with iron added  · Blackstrap molasses  · Spinach  · Foods cooked in cast-iron pans. This is especially true of acidic foods, such as tomatoes and mickey.   Why use a supplement?  Women often need additional iron because they lose menstrual blood during their period. But even grown men and boys may need a supplement at some point. You may need an iron supplement if any of the following is true for you:  · I rarely eat foods that are high in iron (such as red meat, poultry, dried beans, and foods with iron added).  · I am a vegetarian and I rarely eat legumes (dried beans, peas, lentils).  · I am a woman who has heavy menstrual periods.  · I am pregnant or breastfeeding.  · I have been diagnosed with iron-deficiency anemia.  If you take iron  Here are some tips to help you get the most from an iron supplement:  · Take it with vitamin C for better absorption.  · Dont take an iron supplement with milk. The calcium in milk limits iron absorption.  · Iron supplements can cause constipation. To prevent this, drink plenty of water, eat high-fiber foods, and exercise often. Also try an iron supplement with an added stool softener.  · Eat a healthy diet to get all the nutrients your body  needs.  Recommended iron intake  The amount of iron each person needs is different, and varies by age. Women who are pregnant or breastfeeding need extra iron. But taking more than the suggested amount is not always healthy. Your health care provider can help you choose the right amount of iron for you.   Date Last Reviewed: 6/2/2015  © 4789-9471 [employer] [address] [place] [address], [place] [address]. All rights reserved. This information is not intended as a substitute for professional medical care. Always follow your healthcare professional's instructions.

## 2019-05-21 NOTE — ASSESSMENT & PLAN NOTE
Patient reports last menstrual period 1.5 years ago. However her saturated iron level 17%, has decreased to 17%. Ferritin remains slightly elevated but decreased from prior. Patient unsure if diet is rich in iron sources. Her hemoglobin remains WNL.     Orders placed for EGD/Colonoscopy. Will try ICAR-C PO daily. Handout of iron rich foods given to patient. She will f/u 3-4 months with labs 1-2 days prior. Await EGD/Colonoscopy. She knows to contact us if intolerant to oral iron replacement, questions, concerns, anemia symptoms

## 2019-05-21 NOTE — PROGRESS NOTES
Subjective:       Patient ID: Alexandra Rivas is a 48 y.o. female.    Chief Complaint: Iron deficiency. Lab results    HPI: 48 y.o female who presents today for f/u LEVI believed to be secondary to irregular heavy menses. Patient has been intolerant of oral iron in the past due to GI upset. Patient received and tolerated IV Injectafer 2017. Iron levels had been normal since receiving iron infusions. Patient reports last menses 1.5 years ago.     Labs for today's visit reflect decreased saturated iron level 17%. Hemoglobin remains normal. CMP unremarkable. Patient denies any abnormal visible bleeding. Her only complaint is intermittent fatigue. She denies all other associated symptoms.   Social History     Socioeconomic History    Marital status:      Spouse name: Not on file    Number of children: Not on file    Years of education: Not on file    Highest education level: Not on file   Occupational History    Not on file   Social Needs    Financial resource strain: Not hard at all    Food insecurity:     Worry: Never true     Inability: Never true    Transportation needs:     Medical: No     Non-medical: No   Tobacco Use    Smoking status: Never Smoker    Smokeless tobacco: Never Used   Substance and Sexual Activity    Alcohol use: Yes     Alcohol/week: 0.0 oz     Frequency: Monthly or less     Drinks per session: 1 or 2     Binge frequency: Never     Comment: occasionally    Drug use: No    Sexual activity: Yes     Partners: Male     Birth control/protection: Surgical     Comment: BTL; mut monog   Lifestyle    Physical activity:     Days per week: 0 days     Minutes per session: 0 min    Stress: Only a little   Relationships    Social connections:     Talks on phone: More than three times a week     Gets together: Once a week     Attends Yarsani service: Not on file     Active member of club or organization: No     Attends meetings of clubs or organizations: Never     Relationship  "status:    Other Topics Concern    Are you pregnant or think you may be? No    Breast-feeding No   Social History Narrative    Not on file       Past Medical History:   Diagnosis Date    Allergic rhinitis     FH: thyroid cancer     Gastritis and duodenitis 4/19/2016    Headache, classical migraine     occasional aura "every now and then"    Vitamin D deficiency disease        Family History   Problem Relation Age of Onset    Hypertension Mother     Aneurysm Father     Thyroid cancer Unknown         papillary, mother and sister 43y.    Hypertension Sister     Diabetes Paternal Grandmother     Psoriasis Maternal Grandmother     Breast cancer Neg Hx     Colon cancer Neg Hx     Ovarian cancer Neg Hx     Melanoma Neg Hx     Lupus Neg Hx     Eczema Neg Hx     Thrombophilia Neg Hx        Past Surgical History:   Procedure Laterality Date    ESOPHAGOGASTRODUODENOSCOPY (EGD) N/A 3/4/2016    Performed by Mark Ugalde MD at [facility] ENDO    TONSILLECTOMY      TUBAL LIGATION         Review of Systems   Constitutional: Positive for fatigue. Negative for activity change, appetite change, chills, diaphoresis, fever and unexpected weight change.   HENT: Negative for congestion, mouth sores, nosebleeds, sore throat, trouble swallowing and voice change.    Eyes: Negative for photophobia and visual disturbance.   Respiratory: Negative for cough, chest tightness, shortness of breath and wheezing.    Cardiovascular: Negative for chest pain, palpitations and leg swelling.   Gastrointestinal: Negative for abdominal distention, abdominal pain, anal bleeding, blood in stool, constipation, diarrhea, nausea, rectal pain and vomiting.   Genitourinary: Negative for difficulty urinating, dysuria and hematuria.   Musculoskeletal: Negative for arthralgias, back pain and myalgias.   Skin: Negative for pallor, rash and wound.   Neurological: Negative for dizziness, syncope, weakness, light-headedness and headaches. "   Hematological: Negative for adenopathy. Does not bruise/bleed easily.   Psychiatric/Behavioral: The patient is not nervous/anxious.          Medication List with Changes/Refills   New Medications    IRON-VITAMIN C 100-250 MG, ICAR-C, (ICAR-C) 100-250 MG TAB    Take 1 tablet by mouth once daily.   Current Medications    AMITRIPTYLINE (ELAVIL) 10 MG TABLET    Take 1 tablet (10 mg total) by mouth nightly as needed for Insomnia.    CHOLECALCIFEROL, VITAMIN D3, 2,000 UNIT CAP    Take 1 capsule (2,000 Units total) by mouth once daily.    CLOBETASOL (TEMOVATE) 0.05 % EXTERNAL SOLUTION    Apply to affected areas of scalp 1-2 times daily as needed for itch    ERGOCALCIFEROL (ERGOCALCIFEROL) 50,000 UNIT CAP    Take 1 capsule (50,000 Units total) by mouth once a week.    FLUTICASONE (FLONASE) 50 MCG/ACTUATION NASAL SPRAY    2 sprays (100 mcg total) by Each Nare route once daily.    KETOCONAZOLE (NIZORAL) 2 % SHAMPOO    Wash hair with medicated shampoo at least 1x/week - let sit on scalp at least 5 minutes prior to rinsing    LORATADINE (CLARITIN) 10 MG TABLET    Take 1 tablet (10 mg total) by mouth once daily.    OMEPRAZOLE (PRILOSEC) 40 MG CAPSULE    Take 1 capsule (40 mg total) by mouth once daily.    VITAMIN E 400 UNIT CAPSULE    Take 1 capsule (400 Units total) by mouth once daily.     Objective:     Vitals:    05/21/19 0955   BP: 118/82   Pulse: 78   Temp: 98.4 °F (36.9 °C)     Lab Results   Component Value Date    WBC 5.34 05/14/2019    WBC 5.34 05/14/2019    HGB 12.6 05/14/2019    HGB 12.6 05/14/2019    HCT 40.9 05/14/2019    HCT 40.9 05/14/2019    MCV 88 05/14/2019    MCV 88 05/14/2019     05/14/2019     05/14/2019     Lab Results   Component Value Date    IRON 58 05/14/2019    TIBC 333 05/14/2019    FERRITIN 324 (H) 05/14/2019     BMP  Lab Results   Component Value Date     05/14/2019     05/14/2019    K 3.8 05/14/2019    K 3.8 05/14/2019     05/14/2019     05/14/2019    CO2 26  05/14/2019    CO2 26 05/14/2019    BUN 14 05/14/2019    BUN 14 05/14/2019    CREATININE 0.9 05/14/2019    CREATININE 0.9 05/14/2019    CALCIUM 9.8 05/14/2019    CALCIUM 9.8 05/14/2019    ANIONGAP 10 05/14/2019    ANIONGAP 10 05/14/2019    ESTGFRAFRICA >60 05/14/2019    ESTGFRAFRICA >60 05/14/2019    EGFRNONAA >60 05/14/2019    EGFRNONAA >60 05/14/2019     Lab Results   Component Value Date    ALT 7 (L) 05/14/2019    ALT 7 (L) 05/14/2019    AST 16 05/14/2019    AST 16 05/14/2019    ALKPHOS 52 (L) 05/14/2019    ALKPHOS 52 (L) 05/14/2019    BILITOT 1.0 05/14/2019    BILITOT 1.0 05/14/2019         Physical Exam   Constitutional: She is oriented to person, place, and time. She appears well-developed and well-nourished. She is cooperative.   HENT:   Head: Normocephalic.   Right Ear: Hearing normal. No drainage.   Left Ear: Hearing normal. No drainage.   Nose: Nose normal.   Mouth/Throat: Oropharynx is clear and moist.   Eyes: Conjunctivae, EOM and lids are normal. Right eye exhibits no discharge. Left eye exhibits no discharge. No scleral icterus.   Neck: Normal range of motion. No thyroid mass present.   Cardiovascular: Normal rate, regular rhythm and normal heart sounds.   No murmur heard.  Pulmonary/Chest: Effort normal and breath sounds normal. No respiratory distress. She has no wheezes. She has no rhonchi. She has no rales.   Abdominal: Soft. Bowel sounds are normal. She exhibits no distension. There is no tenderness.   Genitourinary:   Genitourinary Comments: deferred   Musculoskeletal: Normal range of motion. She exhibits no edema.   Lymphadenopathy:        Head (right side): No submandibular, no preauricular and no posterior auricular adenopathy present.        Head (left side): No submandibular, no preauricular and no posterior auricular adenopathy present.        Right cervical: No superficial cervical adenopathy present.       Left cervical: No superficial cervical adenopathy present.   Neurological: She is  alert and oriented to person, place, and time.   Skin: Skin is warm, dry and intact.   Psychiatric: She has a normal mood and affect. Her speech is normal and behavior is normal. Thought content normal.   Vitals reviewed.       Assessment:     Problem List Items Addressed This Visit        Oncology    Iron deficiency anemia due to chronic blood loss - Primary     Patient reports last menstrual period 1.5 years ago. However her saturated iron level 17%, has decreased to 17%. Ferritin remains slightly elevated but decreased from prior. Patient unsure if diet is rich in iron sources. Her hemoglobin remains WNL.     Orders placed for EGD/Colonoscopy. Will try ICAR-C PO daily. Handout of iron rich foods given to patient. She will f/u 3-4 months with labs 1-2 days prior. Await EGD/Colonoscopy. She knows to contact us if intolerant to oral iron replacement, questions, concerns, anemia symptoms         Relevant Medications    iron-vitamin C 100-250 mg, ICAR-C, (ICAR-C) 100-250 mg Tab    Other Relevant Orders    Case request GI: COLONOSCOPY (Completed)    CBC auto differential    Comprehensive metabolic panel    Iron and TIBC    Ferritin    C-reactive protein            Plan:     Iron deficiency anemia due to chronic blood loss  -     Case request GI: COLONOSCOPY  -     iron-vitamin C 100-250 mg, ICAR-C, (ICAR-C) 100-250 mg Tab; Take 1 tablet by mouth once daily.  Dispense: 60 tablet; Refill: 1  -     CBC auto differential; Future; Expected date: 05/21/2019  -     Comprehensive metabolic panel; Future; Expected date: 05/21/2019  -     Iron and TIBC; Future; Expected date: 05/21/2019  -     Ferritin; Future; Expected date: 05/21/2019  -     C-reactive protein; Future; Expected date: 05/21/2019          I will review assessment/plan with collaborating physician     TORO Roth

## 2019-06-27 ENCOUNTER — OFFICE VISIT (OUTPATIENT)
Dept: OBSTETRICS AND GYNECOLOGY | Facility: CLINIC | Age: 49
End: 2019-06-27
Payer: COMMERCIAL

## 2019-06-27 VITALS
BODY MASS INDEX: 18.37 KG/M2 | HEIGHT: 65 IN | DIASTOLIC BLOOD PRESSURE: 70 MMHG | WEIGHT: 110.25 LBS | SYSTOLIC BLOOD PRESSURE: 100 MMHG

## 2019-06-27 DIAGNOSIS — N94.10 DYSPAREUNIA IN FEMALE: ICD-10-CM

## 2019-06-27 DIAGNOSIS — Z01.419 ENCOUNTER FOR GYNECOLOGICAL EXAMINATION WITHOUT ABNORMAL FINDING: Primary | ICD-10-CM

## 2019-06-27 PROCEDURE — 99999 PR PBB SHADOW E&M-EST. PATIENT-LVL III: CPT | Mod: PBBFAC,,, | Performed by: OBSTETRICS & GYNECOLOGY

## 2019-06-27 PROCEDURE — 99396 PR PREVENTIVE VISIT,EST,40-64: ICD-10-PCS | Mod: S$GLB,,, | Performed by: OBSTETRICS & GYNECOLOGY

## 2019-06-27 PROCEDURE — 99396 PREV VISIT EST AGE 40-64: CPT | Mod: S$GLB,,, | Performed by: OBSTETRICS & GYNECOLOGY

## 2019-06-27 PROCEDURE — 99999 PR PBB SHADOW E&M-EST. PATIENT-LVL III: ICD-10-PCS | Mod: PBBFAC,,, | Performed by: OBSTETRICS & GYNECOLOGY

## 2019-06-30 NOTE — PROGRESS NOTES
"CC: Well woman exam    Alexandra Rivas is a 48 y.o. female  presents for a well woman exam.  LMP: No LMP recorded. Patient is perimenopausal..  C/o painful intercourse. Some climacteric symptoms    Past Medical History:   Diagnosis Date    Allergic rhinitis     FH: thyroid cancer     Gastritis and duodenitis 2016    Headache, classical migraine     occasional aura "every now and then"    Vitamin D deficiency disease      Past Surgical History:   Procedure Laterality Date    ESOPHAGOGASTRODUODENOSCOPY (EGD) N/A 3/4/2016    Performed by [facility]galde [facility] at [facility] ENDO    TONSILLECTOMY      TUBAL LIGATION       Social History     Socioeconomic History    Marital status:      Spouse name: Not on file    Number of children: Not on file    Years of education: Not on file    Highest education level: Not on file   Occupational History    Not on file   Social Needs    Financial resource strain: Not hard at all    Food insecurity:     Worry: Never true     Inability: Never true    Transportation needs:     Medical: No     Non-medical: No   Tobacco Use    Smoking status: Never Smoker    Smokeless tobacco: Never Used   Substance and Sexual Activity    Alcohol use: Yes     Alcohol/week: 0.0 oz     Frequency: Monthly or less     Drinks per session: 1 or 2     Binge frequency: Never     Comment: occasionally    Drug use: No    Sexual activity: Yes     Partners: Male     Birth control/protection: Surgical     Comment: BTL; mut monog   Lifestyle    Physical activity:     Days per week: 0 days     Minutes per session: 0 min    Stress: Only a little   Relationships    Social connections:     Talks on phone: More than three times a week     Gets together: Once a week     Attends [employer]op[employer] service: Not on file     Active member of club or organization: No     Attends meetings of clubs or organizations: Never     Relationship status:    Other Topics Concern    Are you " pregnant or think you may be? No    Breast-feeding No   Social History Narrative    Not on file     Family History   Problem Relation Age of Onset    Hypertension Mother     Aneurysm Father     Thyroid cancer Unknown         papillary, mother and sister 43y.    Hypertension Sister     Diabetes Paternal Grandmother     Psoriasis Maternal Grandmother     Breast cancer Neg Hx     Colon cancer Neg Hx     Ovarian cancer Neg Hx     Melanoma Neg Hx     Lupus Neg Hx     Eczema Neg Hx     Thrombophilia Neg Hx      OB History        1    Para   1    Term   1            AB        Living   1       SAB        TAB        Ectopic        Multiple        Live Births   1                 Current Outpatient Medications:     amitriptyline (ELAVIL) 10 MG tablet, Take 1 tablet (10 mg total) by mouth nightly as needed for Insomnia., Disp: 90 tablet, Rfl: 3    cholecalciferol, vitamin D3, 2,000 unit Cap, Take 1 capsule (2,000 Units total) by mouth once daily., Disp: 100 capsule, Rfl: 6    clobetasol (TEMOVATE) 0.05 % external solution, Apply to affected areas of scalp 1-2 times daily as needed for itch, Disp: 50 mL, Rfl: 3    fluticasone (FLONASE) 50 mcg/actuation nasal spray, 2 sprays (100 mcg total) by Each Nare route once daily., Disp: 3 Bottle, Rfl: 3    iron-vitamin C 100-250 mg, ICAR-C, (ICAR-C) 100-250 mg Tab, Take 1 tablet by mouth once daily., Disp: 60 tablet, Rfl: 1    ketoconazole (NIZORAL) 2 % shampoo, Wash hair with medicated shampoo at least 1x/week - let sit on scalp at least 5 minutes prior to rinsing, Disp: 120 mL, Rfl: 5    omeprazole (PRILOSEC) 40 MG capsule, Take 1 capsule (40 mg total) by mouth once daily., Disp: 90 capsule, Rfl: 3    vitamin E 400 UNIT capsule, Take 1 capsule (400 Units total) by mouth once daily., Disp: , Rfl:     loratadine (CLARITIN) 10 mg tablet, Take 1 tablet (10 mg total) by mouth once daily., Disp: , Rfl: 0    GYNECOLOGY HISTORY:  No abnormal pap/std    DATA  "REVIEWED:  Last pap: normal Date: 2018  Last mmg: normal Date: 2019    /70 (BP Location: Right arm)   Ht 5' 5" (1.651 m)   Wt 50 kg (110 lb 3.7 oz)   BMI 18.34 kg/m²     ROS:  GENERAL: Denies weight gain or weight loss. Feeling well overall.   SKIN: Denies rash or lesions.   HEAD: Denies head injury or headache.   NODES: Denies enlarged lymph nodes.   CHEST: Denies chest pain or shortness of breath.   CARDIOVASCULAR: Denies palpitations or left sided chest pain.   ABDOMEN: No abdominal pain, constipation, diarrhea, nausea, vomiting or rectal bleeding.   URINARY: No frequency, dysuria, hematuria, or burning on urination.  REPRODUCTIVE: See HPI.   BREASTS: The patient denies pain, lumps, or nipple discharge.   HEMATOLOGIC: No easy bruisability or excessive bleeding.   MUSCULOSKELETAL: Denies joint pain or swelling.   NEUROLOGIC: Denies syncope or weakness.   PSYCHIATRIC: Denies depression, anxiety or mood swings.    PHYSICAL EXAM:    APPEARANCE: Well nourished, well developed, in no acute distress.  AFFECT: WNL, alert and oriented x 3  SKIN: No acne or hirsutism  NECK: Neck symmetric without masses or thyromegaly  NODES: No inguinal, cervical, axillary, or femoral lymph node enlargement  CHEST: Good respiratory effect  ABDOMEN: Soft.  No tenderness or masses.  No hepatosplenomegaly.  No hernias.  BREASTS: Symmetrical, no skin changes or visible lesions.  No palpable masses, nipple discharge bilaterally.  PELVIC: Normal external genitalia without lesions.  Normal hair distribution.  Adequate perineal body, normal urethral meatus.  Vagina atrophic without lesions or discharge.  Cervix pink, without lesions, discharge or tenderness.  No significant cystocele or rectocele.  Bimanual exam shows uterus to be normal size, regular, mobile and nontender.  Adnexa without masses or tenderness.   EXTREMITIES: No edema.    Encounter for gynecological examination without abnormal finding    Dyspareunia in female    rec " replens & olive oil for intercourse & vaginal dryness; counseled on indications for vaginal estrogen    Patient was counseled today on A.C.S. Pap guidelines (q3) and recommendations for yearly pelvic exams, yearly mammograms starting age 40, and clinical breast exams; to see her PCP for other health maintenance.

## 2019-10-28 ENCOUNTER — OFFICE VISIT (OUTPATIENT)
Dept: HEMATOLOGY/ONCOLOGY | Facility: CLINIC | Age: 49
End: 2019-10-28
Payer: COMMERCIAL

## 2019-10-28 ENCOUNTER — LAB VISIT (OUTPATIENT)
Dept: LAB | Facility: HOSPITAL | Age: 49
End: 2019-10-28
Attending: NURSE PRACTITIONER
Payer: COMMERCIAL

## 2019-10-28 VITALS
TEMPERATURE: 99 F | OXYGEN SATURATION: 100 % | DIASTOLIC BLOOD PRESSURE: 80 MMHG | HEART RATE: 80 BPM | HEIGHT: 65 IN | RESPIRATION RATE: 16 BRPM | WEIGHT: 111.56 LBS | SYSTOLIC BLOOD PRESSURE: 120 MMHG | BODY MASS INDEX: 18.59 KG/M2

## 2019-10-28 DIAGNOSIS — D50.9 IRON DEFICIENCY ANEMIA, UNSPECIFIED IRON DEFICIENCY ANEMIA TYPE: ICD-10-CM

## 2019-10-28 DIAGNOSIS — D50.0 IRON DEFICIENCY ANEMIA DUE TO CHRONIC BLOOD LOSS: ICD-10-CM

## 2019-10-28 DIAGNOSIS — D50.0 IRON DEFICIENCY ANEMIA DUE TO CHRONIC BLOOD LOSS: Primary | ICD-10-CM

## 2019-10-28 LAB
ALBUMIN SERPL BCP-MCNC: 4.5 G/DL (ref 3.5–5.2)
ALP SERPL-CCNC: 54 U/L (ref 55–135)
ALT SERPL W/O P-5'-P-CCNC: 11 U/L (ref 10–44)
ANION GAP SERPL CALC-SCNC: 9 MMOL/L (ref 8–16)
AST SERPL-CCNC: 19 U/L (ref 10–40)
BASOPHILS # BLD AUTO: 0.03 K/UL (ref 0–0.2)
BASOPHILS NFR BLD: 0.8 % (ref 0–1.9)
BILIRUB SERPL-MCNC: 0.9 MG/DL (ref 0.1–1)
BUN SERPL-MCNC: 12 MG/DL (ref 6–20)
CALCIUM SERPL-MCNC: 9.9 MG/DL (ref 8.7–10.5)
CHLORIDE SERPL-SCNC: 105 MMOL/L (ref 95–110)
CO2 SERPL-SCNC: 27 MMOL/L (ref 23–29)
CREAT SERPL-MCNC: 0.8 MG/DL (ref 0.5–1.4)
CRP SERPL-MCNC: 0.6 MG/L (ref 0–8.2)
DIFFERENTIAL METHOD: ABNORMAL
EOSINOPHIL # BLD AUTO: 0.1 K/UL (ref 0–0.5)
EOSINOPHIL NFR BLD: 1.3 % (ref 0–8)
ERYTHROCYTE [DISTWIDTH] IN BLOOD BY AUTOMATED COUNT: 12.7 % (ref 11.5–14.5)
EST. GFR  (AFRICAN AMERICAN): >60 ML/MIN/1.73 M^2
EST. GFR  (NON AFRICAN AMERICAN): >60 ML/MIN/1.73 M^2
FERRITIN SERPL-MCNC: 353 NG/ML (ref 20–300)
GLUCOSE SERPL-MCNC: 87 MG/DL (ref 70–110)
HCT VFR BLD AUTO: 40.9 % (ref 37–48.5)
HGB BLD-MCNC: 13 G/DL (ref 12–16)
IMM GRANULOCYTES # BLD AUTO: 0.01 K/UL (ref 0–0.04)
IMM GRANULOCYTES NFR BLD AUTO: 0.3 % (ref 0–0.5)
IRON SERPL-MCNC: 73 UG/DL (ref 30–160)
LYMPHOCYTES # BLD AUTO: 1.5 K/UL (ref 1–4.8)
LYMPHOCYTES NFR BLD: 38.6 % (ref 18–48)
MCH RBC QN AUTO: 27.4 PG (ref 27–31)
MCHC RBC AUTO-ENTMCNC: 31.8 G/DL (ref 32–36)
MCV RBC AUTO: 86 FL (ref 82–98)
MONOCYTES # BLD AUTO: 0.3 K/UL (ref 0.3–1)
MONOCYTES NFR BLD: 7.3 % (ref 4–15)
NEUTROPHILS # BLD AUTO: 2.1 K/UL (ref 1.8–7.7)
NEUTROPHILS NFR BLD: 52 % (ref 38–73)
NRBC BLD-RTO: 0 /100 WBC
PLATELET # BLD AUTO: 260 K/UL (ref 150–350)
PMV BLD AUTO: 9.2 FL (ref 9.2–12.9)
POTASSIUM SERPL-SCNC: 4 MMOL/L (ref 3.5–5.1)
PROT SERPL-MCNC: 7.7 G/DL (ref 6–8.4)
RBC # BLD AUTO: 4.74 M/UL (ref 4–5.4)
SATURATED IRON: 24 % (ref 20–50)
SODIUM SERPL-SCNC: 141 MMOL/L (ref 136–145)
TOTAL IRON BINDING CAPACITY: 308 UG/DL (ref 250–450)
TRANSFERRIN SERPL-MCNC: 208 MG/DL (ref 200–375)
WBC # BLD AUTO: 3.96 K/UL (ref 3.9–12.7)

## 2019-10-28 PROCEDURE — 3008F BODY MASS INDEX DOCD: CPT | Mod: CPTII,S$GLB,, | Performed by: NURSE PRACTITIONER

## 2019-10-28 PROCEDURE — 83540 ASSAY OF IRON: CPT

## 2019-10-28 PROCEDURE — 99999 PR PBB SHADOW E&M-EST. PATIENT-LVL III: CPT | Mod: PBBFAC,,, | Performed by: NURSE PRACTITIONER

## 2019-10-28 PROCEDURE — 85025 COMPLETE CBC W/AUTO DIFF WBC: CPT

## 2019-10-28 PROCEDURE — 80053 COMPREHEN METABOLIC PANEL: CPT

## 2019-10-28 PROCEDURE — 99214 PR OFFICE/OUTPT VISIT, EST, LEVL IV, 30-39 MIN: ICD-10-PCS | Mod: S$GLB,,, | Performed by: NURSE PRACTITIONER

## 2019-10-28 PROCEDURE — 36415 COLL VENOUS BLD VENIPUNCTURE: CPT

## 2019-10-28 PROCEDURE — 82728 ASSAY OF FERRITIN: CPT

## 2019-10-28 PROCEDURE — 3008F PR BODY MASS INDEX (BMI) DOCUMENTED: ICD-10-PCS | Mod: CPTII,S$GLB,, | Performed by: NURSE PRACTITIONER

## 2019-10-28 PROCEDURE — 99999 PR PBB SHADOW E&M-EST. PATIENT-LVL III: ICD-10-PCS | Mod: PBBFAC,,, | Performed by: NURSE PRACTITIONER

## 2019-10-28 PROCEDURE — 86140 C-REACTIVE PROTEIN: CPT

## 2019-10-28 PROCEDURE — 99214 OFFICE O/P EST MOD 30 MIN: CPT | Mod: S$GLB,,, | Performed by: NURSE PRACTITIONER

## 2019-10-28 RX ORDER — ERGOCALCIFEROL 1.25 MG/1
50000 CAPSULE ORAL
Refills: 4 | COMMUNITY
Start: 2019-08-14 | End: 2020-05-13

## 2019-10-28 RX ORDER — ACETAMINOPHEN, DEXTROMETHORPHAN HBR, GUAIFENESIN, PSEUDOEPHEDRINE HCL 325; 20; 200; 60 MG/1; MG/1; MG/1; MG/1
TABLET ORAL
Refills: 0 | COMMUNITY
Start: 2019-10-23 | End: 2020-07-08

## 2019-10-28 NOTE — PROGRESS NOTES
Subjective:       Patient ID: Alexandra Rivas is a 49 y.o. female.    Chief Complaint: Iron deficiency. Lab results    HPI: 49 y.o female who with h/o LEVI believed to be secondary to irregular heavy menses. Patient has been intolerant of oral iron in the past due to GI upset. Patient received and tolerated IV Injectafer 2017. Iron levels had been normal since receiving iron infusions. Patient reports last menses 1.5 years ago.     Patient presents today for follow up of LEVI. She is without complaints today. She denies all other associated symptoms.    Social History     Socioeconomic History    Marital status:      Spouse name: Not on file    Number of children: Not on file    Years of education: Not on file    Highest education level: Not on file   Occupational History    Not on file   Social Needs    Financial resource strain: Not hard at all    Food insecurity:     Worry: Never true     Inability: Never true    Transportation needs:     Medical: No     Non-medical: No   Tobacco Use    Smoking status: Never Smoker    Smokeless tobacco: Never Used   Substance and Sexual Activity    Alcohol use: Yes     Alcohol/week: 0.0 standard drinks     Frequency: Monthly or less     Drinks per session: 1 or 2     Binge frequency: Never     Comment: occasionally    Drug use: No    Sexual activity: Yes     Partners: Male     Birth control/protection: Surgical     Comment: BTL; mut monog   Lifestyle    Physical activity:     Days per week: 0 days     Minutes per session: 0 min    Stress: Only a little   Relationships    Social connections:     Talks on phone: More than three times a week     Gets together: Once a week     Attends Yarsanism service: Not on file     Active member of club or organization: No     Attends meetings of clubs or organizations: Never     Relationship status:    Other Topics Concern    Are you pregnant or think you may be? No    Breast-feeding No   Social History  "Narrative    Not on file       Past Medical History:   Diagnosis Date    Allergic rhinitis     FH: thyroid cancer     Gastritis and duodenitis 4/19/2016    Headache, classical migraine     occasional aura "every now and then"    Vitamin D deficiency disease        Family History   Problem Relation Age of Onset    Hypertension Mother     Aneurysm Father     Thyroid cancer Unknown         papillary, mother and sister 43y.    Hypertension Sister     Diabetes Paternal Grandmother     Psoriasis Maternal Grandmother     Breast cancer Neg Hx     Colon cancer Neg Hx     Ovarian cancer Neg Hx     Melanoma Neg Hx     Lupus Neg Hx     Eczema Neg Hx     Thrombophilia Neg Hx        Past Surgical History:   Procedure Laterality Date    TONSILLECTOMY      TUBAL LIGATION         Review of Systems   Constitutional: Negative for activity change, appetite change, chills, diaphoresis, fatigue, fever and unexpected weight change.   HENT: Negative for congestion, mouth sores, nosebleeds, sore throat, trouble swallowing and voice change.    Eyes: Negative for photophobia and visual disturbance.   Respiratory: Negative for cough, chest tightness, shortness of breath and wheezing.    Cardiovascular: Negative for chest pain, palpitations and leg swelling.   Gastrointestinal: Negative for abdominal distention, abdominal pain, anal bleeding, blood in stool, constipation, diarrhea, nausea, rectal pain and vomiting.   Genitourinary: Negative for difficulty urinating, dysuria and hematuria.   Musculoskeletal: Negative for arthralgias, back pain and myalgias.   Skin: Negative for pallor, rash and wound.   Neurological: Negative for dizziness, syncope, weakness, light-headedness and headaches.   Hematological: Negative for adenopathy. Does not bruise/bleed easily.   Psychiatric/Behavioral: The patient is not nervous/anxious.          Medication List with Changes/Refills   Current Medications    AMITRIPTYLINE (ELAVIL) 10 MG " TABLET    Take 1 tablet (10 mg total) by mouth nightly as needed for Insomnia.    CHOLECALCIFEROL, VITAMIN D3, 2,000 UNIT CAP    Take 1 capsule (2,000 Units total) by mouth once daily.    CLOBETASOL (TEMOVATE) 0.05 % EXTERNAL SOLUTION    Apply to affected areas of scalp 1-2 times daily as needed for itch    DURAFLU 89--325 MG TAB    TAKE 1 TABLET BY MOUTH EVERY 4 HOURS FOR 7 DAYS    FLUTICASONE (FLONASE) 50 MCG/ACTUATION NASAL SPRAY    2 sprays (100 mcg total) by Each Nare route once daily.    KETOCONAZOLE (NIZORAL) 2 % SHAMPOO    Wash hair with medicated shampoo at least 1x/week - let sit on scalp at least 5 minutes prior to rinsing    LORATADINE (CLARITIN) 10 MG TABLET    Take 1 tablet (10 mg total) by mouth once daily.    OMEPRAZOLE (PRILOSEC) 40 MG CAPSULE    Take 1 capsule (40 mg total) by mouth once daily.    VITAMIN D2 50,000 UNIT CAPSULE    Take 50,000 Units by mouth every 7 days.    VITAMIN E 400 UNIT CAPSULE    Take 1 capsule (400 Units total) by mouth once daily.     Objective:     Vitals:    10/28/19 1115   BP: 120/80   Pulse: 80   Resp: 16   Temp: 98.6 °F (37 °C)     Lab Results   Component Value Date    WBC 3.96 10/28/2019    HGB 13.0 10/28/2019    HCT 40.9 10/28/2019    MCV 86 10/28/2019     10/28/2019     Lab Results   Component Value Date    IRON 58 05/14/2019    TIBC 333 05/14/2019    FERRITIN 324 (H) 05/14/2019     BMP  Lab Results   Component Value Date     10/28/2019    K 4.0 10/28/2019     10/28/2019    CO2 27 10/28/2019    BUN 12 10/28/2019    CREATININE 0.8 10/28/2019    CALCIUM 9.9 10/28/2019    ANIONGAP 9 10/28/2019    ESTGFRAFRICA >60 10/28/2019    EGFRNONAA >60 10/28/2019     Lab Results   Component Value Date    ALT 11 10/28/2019    AST 19 10/28/2019    ALKPHOS 54 (L) 10/28/2019    BILITOT 0.9 10/28/2019         Physical Exam   Constitutional: She is oriented to person, place, and time. She appears well-developed and well-nourished. She is cooperative.   DANA:    Head: Normocephalic.   Right Ear: Hearing normal. No drainage.   Left Ear: Hearing normal. No drainage.   Nose: Nose normal.   Mouth/Throat: Oropharynx is clear and moist.   Eyes: Conjunctivae, EOM and lids are normal. Right eye exhibits no discharge. Left eye exhibits no discharge. No scleral icterus.   Neck: Normal range of motion. No thyroid mass present.   Cardiovascular: Normal rate, regular rhythm and normal heart sounds.   No murmur heard.  Pulmonary/Chest: Effort normal and breath sounds normal. No respiratory distress. She has no wheezes. She has no rhonchi. She has no rales.   Abdominal: Soft. Bowel sounds are normal. She exhibits no distension. There is no tenderness.   Genitourinary:   Genitourinary Comments: deferred   Musculoskeletal: Normal range of motion. She exhibits no edema.   Lymphadenopathy:        Head (right side): No submandibular, no preauricular and no posterior auricular adenopathy present.        Head (left side): No submandibular, no preauricular and no posterior auricular adenopathy present.        Right cervical: No superficial cervical adenopathy present.       Left cervical: No superficial cervical adenopathy present.   Neurological: She is alert and oriented to person, place, and time.   Skin: Skin is warm, dry and intact.   Psychiatric: She has a normal mood and affect. Her speech is normal and behavior is normal. Thought content normal.   Vitals reviewed.       Assessment:     Problem List Items Addressed This Visit        Oncology    Iron deficiency anemia     Hemoglobin normal. CMP unremarkable. Iron studies pending. F/u and replete PRN. Patient reports taking iron pills sporadically.     Will plan to d/c iron pills if iron levels normal. Can hold off on EGD/Colonoscopy for now. If iron levels are low will proceed with endoscopies.     F/u 3 months with repeat labs         Relevant Orders    CBC auto differential    Basic metabolic panel    Iron and TIBC    Ferritin    Iron  deficiency anemia due to chronic blood loss - Primary    Relevant Orders    CBC auto differential    Basic metabolic panel    Iron and TIBC    Ferritin            Plan:     Iron deficiency anemia due to chronic blood loss  -     CBC auto differential; Future; Expected date: 10/28/2019  -     Basic metabolic panel; Future; Expected date: 10/28/2019  -     Iron and TIBC; Future; Expected date: 10/28/2019  -     Ferritin; Future; Expected date: 10/28/2019    Iron deficiency anemia, unspecified iron deficiency anemia type          I will review assessment/plan with collaborating physician Dr. Gerald Pedersen, SRIDHARP-C

## 2019-10-28 NOTE — ASSESSMENT & PLAN NOTE
Hemoglobin normal. CMP unremarkable. Iron studies pending. F/u and replete PRN. Patient reports taking iron pills sporadically.     Will plan to d/c iron pills if iron levels normal. Can hold off on EGD/Colonoscopy for now. If iron levels are low will proceed with endoscopies.     F/u 3 months with repeat labs

## 2019-11-05 ENCOUNTER — PATIENT MESSAGE (OUTPATIENT)
Dept: FAMILY MEDICINE | Facility: CLINIC | Age: 49
End: 2019-11-05

## 2019-11-05 DIAGNOSIS — E55.9 VITAMIN D DEFICIENCY DISEASE: Primary | ICD-10-CM

## 2019-11-05 NOTE — PROGRESS NOTES
"Subjective:      Patient ID: Alexandra Rivas is a 49 y.o. female.    Chief Complaint: Follow-up (6 months w/ labs )      HPI  Here for follow up of medical problems.  Sister and mom with thyroid cancer.  Energy good.  No exercise lately.  Does think she can walk during a break at work.  Ongoing hot flashes, dominique during night.  HAs doing better lately.  Taking vit D weekly, much more reliable with this dose form.  BMs normal.  No cp/sob/palp.  No f/c/sw/cough.  Right shoulder pain with posterior rotation on and off x 6mo.        11/19 thyroid u/s:  FINDINGS:  Right lobe: 4.9 x 0.8 x 1.3 cm    Left lobe: 4.0 x 1.0 x 1.5 cm    Isthmus: 0.2 cm    Total thyroid volume: 5 cc    Echotexture: Normal    Nodules: No concerning thyroid nodule currently.      Impression       No detrimental change.         Updated/ annual due 5/20:  HM: 11/19 fluvax, 11/19 today HAV, 8/10 TDaP, 5/19 MMG/ Gyn Dr. Acevedo, 11/19 thyroid u/s neg to be repeated yearly.     Review of Systems   Constitutional: Negative for chills, diaphoresis and fever.   Respiratory: Negative for cough and shortness of breath.    Cardiovascular: Negative for chest pain, palpitations and leg swelling.   Gastrointestinal: Negative for blood in stool, constipation, diarrhea, nausea and vomiting.   Genitourinary: Negative for dysuria, frequency and hematuria.   Psychiatric/Behavioral: The patient is not nervous/anxious.          Objective:   /72 (BP Location: Left arm, Patient Position: Sitting, BP Method: Medium (Manual))   Pulse 101   Temp 98.1 °F (36.7 °C) (Oral)   Ht 5' 5" (1.651 m)   Wt 50.1 kg (110 lb 7.2 oz)   SpO2 98%   BMI 18.38 kg/m²     Physical Exam   Constitutional: She is oriented to person, place, and time. She appears well-developed.   HENT:   Mouth/Throat: Oropharynx is clear and moist.   Neck: Neck supple. Carotid bruit is not present. No thyroid mass present.   Cardiovascular: Normal rate, regular rhythm and intact distal pulses. Exam " reveals no gallop and no friction rub.   No murmur heard.  Pulmonary/Chest: Effort normal and breath sounds normal. She has no wheezes. She has no rales.   Abdominal: Soft. Bowel sounds are normal. She exhibits no mass. There is no hepatosplenomegaly. There is no tenderness.   Musculoskeletal: She exhibits no edema.   Lymphadenopathy:     She has no cervical adenopathy.   Neurological: She is alert and oriented to person, place, and time.   Psychiatric: She has a normal mood and affect.       Results for ESTHER YEE (MRN [id number]) as of 11/19/2019 09:14   Ref. Range 5/14/2019 08:04 5/14/2019 08:04 10/28/2019 10:56 11/15/2019 10:54   Vit D, 25-Hydroxy Latest Ref Range: 30.0 - 100.0 ng/mL 17 (L)   44.5       Assessment:       1. Vitamin D deficiency disease    2. Migraine with aura and without status migrainosus, not intractable    3. FH: thyroid cancer    4. Chronic seasonal allergic rhinitis due to pollen    5. Preventive measure    6. Menopausal disorder    7. Tendinitis of right rotator cuff          Plan:     Vitamin D deficiency disease- doing well, cont weekly.  -     Vitamin D; Future    Migraine with aura and without status migrainosus, not intractable/ Menopausal disorder- take rx regularly.  -     amitriptyline (ELAVIL) 10 MG tablet; Take 1 tablet (10 mg total) by mouth nightly as needed for Insomnia.  Dispense: 90 tablet; Refill: 3    FH: thyroid cancer-  Cont u/s every November.    Chronic seasonal allergic rhinitis due to pollen    Preventive measure- fluvax, HAV now.  Lab in 6mo.  600mg ibuprofen TID x 7-10d prn right rot cuff sx.  -     Hepatitis A Vaccine (Adult) (IM)  -     CBC auto differential; Future; Expected date: 11/19/2019  -     Comprehensive metabolic panel; Future; Expected date: 11/19/2019  -     Lipid panel; Future; Expected date: 11/19/2019  -     TSH; Future; Expected date: 11/19/2019  -     Vitamin D; Future

## 2019-11-11 ENCOUNTER — TELEPHONE (OUTPATIENT)
Dept: RADIOLOGY | Facility: HOSPITAL | Age: 49
End: 2019-11-11

## 2019-11-12 ENCOUNTER — HOSPITAL ENCOUNTER (OUTPATIENT)
Dept: RADIOLOGY | Facility: HOSPITAL | Age: 49
Discharge: HOME | End: 2019-11-12
Attending: INTERNAL MEDICINE
Payer: COMMERCIAL

## 2019-11-12 DIAGNOSIS — Z80.8 FH: THYROID CANCER: ICD-10-CM

## 2019-11-12 PROCEDURE — 76536 US EXAM OF HEAD AND NECK: CPT | Mod: 26,,, | Performed by: RADIOLOGY

## 2019-11-12 PROCEDURE — 76536 US SOFT TISSUE HEAD NECK THYROID: ICD-10-PCS | Mod: 26,,, | Performed by: RADIOLOGY

## 2019-11-12 PROCEDURE — 76536 US EXAM OF HEAD AND NECK: CPT | Mod: TC

## 2019-11-16 LAB — 25(OH)D3+25(OH)D2 SERPL-MCNC: 44.5 NG/ML (ref 30–100)

## 2019-11-19 ENCOUNTER — OFFICE VISIT (OUTPATIENT)
Dept: FAMILY MEDICINE | Facility: CLINIC | Age: 49
End: 2019-11-19
Payer: COMMERCIAL

## 2019-11-19 VITALS
HEIGHT: 65 IN | TEMPERATURE: 98 F | DIASTOLIC BLOOD PRESSURE: 72 MMHG | SYSTOLIC BLOOD PRESSURE: 116 MMHG | OXYGEN SATURATION: 98 % | WEIGHT: 110.44 LBS | HEART RATE: 101 BPM | BODY MASS INDEX: 18.4 KG/M2

## 2019-11-19 DIAGNOSIS — E55.9 VITAMIN D DEFICIENCY DISEASE: Primary | ICD-10-CM

## 2019-11-19 DIAGNOSIS — M75.81 TENDINITIS OF RIGHT ROTATOR CUFF: ICD-10-CM

## 2019-11-19 DIAGNOSIS — Z80.8 FH: THYROID CANCER: ICD-10-CM

## 2019-11-19 DIAGNOSIS — G43.109 MIGRAINE WITH AURA AND WITHOUT STATUS MIGRAINOSUS, NOT INTRACTABLE: ICD-10-CM

## 2019-11-19 DIAGNOSIS — J30.1 CHRONIC SEASONAL ALLERGIC RHINITIS DUE TO POLLEN: ICD-10-CM

## 2019-11-19 DIAGNOSIS — Z29.9 PREVENTIVE MEASURE: ICD-10-CM

## 2019-11-19 DIAGNOSIS — N95.9 MENOPAUSAL DISORDER: ICD-10-CM

## 2019-11-19 PROCEDURE — 90471 FLU VACCINE (QUAD) GREATER THAN OR EQUAL TO 3YO PRESERVATIVE FREE IM: ICD-10-PCS | Mod: S$GLB,,, | Performed by: INTERNAL MEDICINE

## 2019-11-19 PROCEDURE — 90471 IMMUNIZATION ADMIN: CPT | Mod: S$GLB,,, | Performed by: INTERNAL MEDICINE

## 2019-11-19 PROCEDURE — 90686 IIV4 VACC NO PRSV 0.5 ML IM: CPT | Mod: S$GLB,,, | Performed by: INTERNAL MEDICINE

## 2019-11-19 PROCEDURE — 90686 FLU VACCINE (QUAD) GREATER THAN OR EQUAL TO 3YO PRESERVATIVE FREE IM: ICD-10-PCS | Mod: S$GLB,,, | Performed by: INTERNAL MEDICINE

## 2019-11-19 PROCEDURE — 90472 HEPATITIS A VACCINE ADULT IM: ICD-10-PCS | Mod: S$GLB,,, | Performed by: INTERNAL MEDICINE

## 2019-11-19 PROCEDURE — 99214 PR OFFICE/OUTPT VISIT, EST, LEVL IV, 30-39 MIN: ICD-10-PCS | Mod: 25,S$GLB,, | Performed by: INTERNAL MEDICINE

## 2019-11-19 PROCEDURE — 90632 HEPA VACCINE ADULT IM: CPT | Mod: S$GLB,,, | Performed by: INTERNAL MEDICINE

## 2019-11-19 PROCEDURE — 3008F BODY MASS INDEX DOCD: CPT | Mod: CPTII,S$GLB,, | Performed by: INTERNAL MEDICINE

## 2019-11-19 PROCEDURE — 3008F PR BODY MASS INDEX (BMI) DOCUMENTED: ICD-10-PCS | Mod: CPTII,S$GLB,, | Performed by: INTERNAL MEDICINE

## 2019-11-19 PROCEDURE — 90472 IMMUNIZATION ADMIN EACH ADD: CPT | Mod: S$GLB,,, | Performed by: INTERNAL MEDICINE

## 2019-11-19 PROCEDURE — 99999 PR PBB SHADOW E&M-EST. PATIENT-LVL IV: CPT | Mod: PBBFAC,,, | Performed by: INTERNAL MEDICINE

## 2019-11-19 PROCEDURE — 99214 OFFICE O/P EST MOD 30 MIN: CPT | Mod: 25,S$GLB,, | Performed by: INTERNAL MEDICINE

## 2019-11-19 PROCEDURE — 99999 PR PBB SHADOW E&M-EST. PATIENT-LVL IV: ICD-10-PCS | Mod: PBBFAC,,, | Performed by: INTERNAL MEDICINE

## 2019-11-19 PROCEDURE — 90632 HEPATITIS A VACCINE ADULT IM: ICD-10-PCS | Mod: S$GLB,,, | Performed by: INTERNAL MEDICINE

## 2019-11-19 RX ORDER — AMITRIPTYLINE HYDROCHLORIDE 10 MG/1
10 TABLET, FILM COATED ORAL NIGHTLY PRN
Qty: 90 TABLET | Refills: 3 | Status: SHIPPED | OUTPATIENT
Start: 2019-11-19 | End: 2020-05-19 | Stop reason: SDUPTHER

## 2020-02-10 ENCOUNTER — TELEPHONE (OUTPATIENT)
Dept: HEMATOLOGY/ONCOLOGY | Facility: CLINIC | Age: 50
End: 2020-02-10

## 2020-02-10 DIAGNOSIS — D50.0 IRON DEFICIENCY ANEMIA DUE TO CHRONIC BLOOD LOSS: Primary | ICD-10-CM

## 2020-02-10 NOTE — TELEPHONE ENCOUNTER
----- Message from Julita Pedersen NP sent at 2/10/2020  1:35 PM CST -----  Contact: Pt   If possible yes please. Thank you  ----- Message -----  From: Gi Lagunas MA  Sent: 2/10/2020   1:24 PM CST  To: Julita Pedersen NP    This patient is coming to see you tomorrow, but no labs are ordered. Do you want her to have labs done prior?  Thanks  Carina  ----- Message -----  From: Kristin Arteaga  Sent: 2/10/2020  12:15 PM CST  To: [facility] Staff    Pt called in regards to checking to see if she needs to complete lab work prior to office visit. Pt can be reached at [phone] or  [phone] (home)

## 2020-02-10 NOTE — TELEPHONE ENCOUNTER
Spoke with patient, advised that Ty does want her to have labs prior to visit. Patient requests orders to be sent to [facility] on [facility] [address]. Patient states she will go today.  Orders faxed//[facility]

## 2020-02-11 ENCOUNTER — OFFICE VISIT (OUTPATIENT)
Dept: HEMATOLOGY/ONCOLOGY | Facility: CLINIC | Age: 50
End: 2020-02-11
Payer: COMMERCIAL

## 2020-02-11 VITALS
OXYGEN SATURATION: 100 % | DIASTOLIC BLOOD PRESSURE: 76 MMHG | WEIGHT: 111.56 LBS | HEART RATE: 70 BPM | SYSTOLIC BLOOD PRESSURE: 119 MMHG | HEIGHT: 62 IN | TEMPERATURE: 97 F | BODY MASS INDEX: 20.53 KG/M2

## 2020-02-11 DIAGNOSIS — D50.0 IRON DEFICIENCY ANEMIA DUE TO CHRONIC BLOOD LOSS: Primary | ICD-10-CM

## 2020-02-11 PROCEDURE — 3008F BODY MASS INDEX DOCD: CPT | Mod: CPTII,S$GLB,, | Performed by: NURSE PRACTITIONER

## 2020-02-11 PROCEDURE — 99213 OFFICE O/P EST LOW 20 MIN: CPT | Mod: S$GLB,,, | Performed by: NURSE PRACTITIONER

## 2020-02-11 PROCEDURE — 3008F PR BODY MASS INDEX (BMI) DOCUMENTED: ICD-10-PCS | Mod: CPTII,S$GLB,, | Performed by: NURSE PRACTITIONER

## 2020-02-11 PROCEDURE — 99999 PR PBB SHADOW E&M-EST. PATIENT-LVL III: ICD-10-PCS | Mod: PBBFAC,,, | Performed by: NURSE PRACTITIONER

## 2020-02-11 PROCEDURE — 99999 PR PBB SHADOW E&M-EST. PATIENT-LVL III: CPT | Mod: PBBFAC,,, | Performed by: NURSE PRACTITIONER

## 2020-02-11 PROCEDURE — 99213 PR OFFICE/OUTPT VISIT, EST, LEVL III, 20-29 MIN: ICD-10-PCS | Mod: S$GLB,,, | Performed by: NURSE PRACTITIONER

## 2020-02-11 NOTE — PROGRESS NOTES
Subjective:       Patient ID: Alexandra Rivas is a 49 y.o. female.    Chief Complaint: Iron deficiency. Lab results    HPI: 49 y.o female who with h/o LEVI believed to be secondary to irregular heavy menses. Patient has been intolerant of oral iron in the past due to GI upset. Patient received and tolerated IV Injectafer 2017. Iron levels had been normal since receiving iron infusions. Patient reports last menses 1.5 years ago.     Patient presents today for follow up of LEVI. She has discontinued iron pills as previously instructed. She is without complaints today. She denies all other associated symptoms.    Social History     Socioeconomic History    Marital status:      Spouse name: Not on file    Number of children: Not on file    Years of education: Not on file    Highest education level: Not on file   Occupational History    Not on file   Social Needs    Financial resource strain: Not hard at all    Food insecurity:     Worry: Never true     Inability: Never true    Transportation needs:     Medical: No     Non-medical: No   Tobacco Use    Smoking status: Never Smoker    Smokeless tobacco: Never Used   Substance and Sexual Activity    Alcohol use: Yes     Alcohol/week: 0.0 standard drinks     Frequency: Monthly or less     Drinks per session: 1 or 2     Binge frequency: Never     Comment: occasionally    Drug use: No    Sexual activity: Yes     Partners: Male     Birth control/protection: Surgical     Comment: BTL; mut monog   Lifestyle    Physical activity:     Days per week: 0 days     Minutes per session: 0 min    Stress: Only a little   Relationships    Social connections:     Talks on phone: More than three times a week     Gets together: Once a week     Attends Latter day service: Not on file     Active member of club or organization: No     Attends meetings of clubs or organizations: Never     Relationship status:    Other Topics Concern    Are you pregnant or think  "you may be? No    Breast-feeding No   Social History Narrative    Not on file       Past Medical History:   Diagnosis Date    Allergic rhinitis     FH: thyroid cancer     Gastritis and duodenitis 4/19/2016    Headache, classical migraine     occasional aura "every now and then"    Vitamin D deficiency disease        Family History   Problem Relation Age of Onset    Hypertension Mother     Aneurysm Father     Thyroid cancer Unknown         papillary, mother and sister 43y.    Hypertension Sister     Diabetes Paternal Grandmother     Psoriasis Maternal Grandmother     Breast cancer Neg Hx     Colon cancer Neg Hx     Ovarian cancer Neg Hx     Melanoma Neg Hx     Lupus Neg Hx     Eczema Neg Hx     Thrombophilia Neg Hx        Past Surgical History:   Procedure Laterality Date    TONSILLECTOMY      TUBAL LIGATION         Review of Systems   Constitutional: Negative for activity change, appetite change, chills, diaphoresis, fatigue, fever and unexpected weight change.   HENT: Negative for congestion, mouth sores, nosebleeds, sore throat, trouble swallowing and voice change.    Eyes: Negative for photophobia and visual disturbance.   Respiratory: Negative for cough, chest tightness, shortness of breath and wheezing.    Cardiovascular: Negative for chest pain, palpitations and leg swelling.   Gastrointestinal: Negative for abdominal distention, abdominal pain, anal bleeding, blood in stool, constipation, diarrhea, nausea, rectal pain and vomiting.   Genitourinary: Negative for difficulty urinating, dysuria and hematuria.   Musculoskeletal: Negative for arthralgias, back pain and myalgias.   Skin: Negative for pallor, rash and wound.   Neurological: Negative for dizziness, syncope, weakness, light-headedness and headaches.   Hematological: Negative for adenopathy. Does not bruise/bleed easily.   Psychiatric/Behavioral: The patient is not nervous/anxious.          Medication List with Changes/Refills "   Current Medications    AMITRIPTYLINE (ELAVIL) 10 MG TABLET    Take 1 tablet (10 mg total) by mouth nightly as needed for Insomnia.    CHOLECALCIFEROL, VITAMIN D3, 2,000 UNIT CAP    Take 1 capsule (2,000 Units total) by mouth once daily.    CLOBETASOL (TEMOVATE) 0.05 % EXTERNAL SOLUTION    Apply to affected areas of scalp 1-2 times daily as needed for itch    DURAFLU 25--325 MG TAB    TAKE 1 TABLET BY MOUTH EVERY 4 HOURS FOR 7 DAYS    FLUTICASONE (FLONASE) 50 MCG/ACTUATION NASAL SPRAY    2 sprays (100 mcg total) by Each Nare route once daily.    KETOCONAZOLE (NIZORAL) 2 % SHAMPOO    Wash hair with medicated shampoo at least 1x/week - let sit on scalp at least 5 minutes prior to rinsing    LORATADINE (CLARITIN) 10 MG TABLET    Take 1 tablet (10 mg total) by mouth once daily.    OMEPRAZOLE (PRILOSEC) 40 MG CAPSULE    Take 1 capsule (40 mg total) by mouth once daily.    VITAMIN D2 50,000 UNIT CAPSULE    Take 50,000 Units by mouth every 7 days.    VITAMIN E 400 UNIT CAPSULE    Take 1 capsule (400 Units total) by mouth once daily.     Objective:     Vitals:    02/11/20 1115   BP: 119/76   Pulse: 70   Temp: 97 °F (36.1 °C)     Lab Results   Component Value Date    WBC 3.96 10/28/2019    HGB 13.0 10/28/2019    HCT 40.9 10/28/2019    MCV 86 10/28/2019     10/28/2019     Lab Results   Component Value Date    IRON 73 10/28/2019    TIBC 308 10/28/2019    FERRITIN 353 (H) 10/28/2019     BMP  Lab Results   Component Value Date     10/28/2019    K 4.0 10/28/2019     10/28/2019    CO2 27 10/28/2019    BUN 12 10/28/2019    CREATININE 0.8 10/28/2019    CALCIUM 9.9 10/28/2019    ANIONGAP 9 10/28/2019    ESTGFRAFRICA >60 10/28/2019    EGFRNONAA >60 10/28/2019     Lab Results   Component Value Date    ALT 11 10/28/2019    AST 19 10/28/2019    ALKPHOS 54 (L) 10/28/2019    BILITOT 0.9 10/28/2019         Physical Exam   Constitutional: She is oriented to person, place, and time. She appears well-developed and  well-nourished. She is cooperative.   HENT:   Head: Normocephalic.   Right Ear: Hearing normal. No drainage.   Left Ear: Hearing normal. No drainage.   Nose: Nose normal.   Mouth/Throat: Oropharynx is clear and moist.   Eyes: Conjunctivae, EOM and lids are normal. Right eye exhibits no discharge. Left eye exhibits no discharge. No scleral icterus.   Neck: Normal range of motion. No thyroid mass present.   Cardiovascular: Normal rate, regular rhythm and normal heart sounds.   No murmur heard.  Pulmonary/Chest: Effort normal and breath sounds normal. No respiratory distress. She has no wheezes. She has no rhonchi. She has no rales.   Abdominal: Soft. Bowel sounds are normal. She exhibits no distension. There is no tenderness.   Genitourinary:   Genitourinary Comments: deferred   Musculoskeletal: Normal range of motion. She exhibits no edema.   Lymphadenopathy:        Head (right side): No submandibular, no preauricular and no posterior auricular adenopathy present.        Head (left side): No submandibular, no preauricular and no posterior auricular adenopathy present.        Right cervical: No superficial cervical adenopathy present.       Left cervical: No superficial cervical adenopathy present.   Neurological: She is alert and oriented to person, place, and time.   Skin: Skin is warm, dry and intact.   Psychiatric: She has a normal mood and affect. Her speech is normal and behavior is normal. Thought content normal.   Vitals reviewed.       Assessment:     Problem List Items Addressed This Visit        Oncology    Iron deficiency anemia due to chronic blood loss - Primary     Labs were drawn at outside [facility] facility and results are not yet available. Patient reports feeling well overall and denies any anemia symptoms. She is s/p menopause    If labs okay will repeat labs in 6-9 months with clinic visit. If labs unremarkable at follow up will consider discharging to PCP. Patient agreeable to receiving telephone  call for any abnormal labs         Relevant Orders    CBC auto differential    Basic metabolic panel    Iron and TIBC    Ferritin            Plan:     Iron deficiency anemia due to chronic blood loss  -     CBC auto differential; Future; Expected date: 02/11/2020  -     Basic metabolic panel; Future; Expected date: 02/11/2020  -     Iron and TIBC; Future; Expected date: 02/11/2020  -     Ferritin; Future; Expected date: 02/11/2020            TORO Roth

## 2020-02-11 NOTE — ASSESSMENT & PLAN NOTE
Labs were drawn at outside [facility] facility and results are not yet available. Patient reports feeling well overall and denies any anemia symptoms. She is s/p menopause    If labs okay will repeat labs in 6-9 months with clinic visit. If labs unremarkable at follow up will consider discharging to PCP. Patient agreeable to receiving telephone call for any abnormal labs

## 2020-05-11 ENCOUNTER — TELEPHONE (OUTPATIENT)
Dept: FAMILY MEDICINE | Facility: CLINIC | Age: 50
End: 2020-05-11

## 2020-05-11 NOTE — TELEPHONE ENCOUNTER
----- Message from Judith Mary sent at 5/11/2020  8:49 AM CDT -----  Contact: pt   Pt needing a call back regarding sending her orders for lab sent to [facility] . Please contact pt     Pt contact# [phone]

## 2020-05-12 ENCOUNTER — TELEPHONE (OUTPATIENT)
Dept: FAMILY MEDICINE | Facility: CLINIC | Age: 50
End: 2020-05-12

## 2020-05-12 DIAGNOSIS — Z29.9 PREVENTIVE MEASURE: Primary | ICD-10-CM

## 2020-05-12 NOTE — TELEPHONE ENCOUNTER
Patient states labs are not at [facility]. Ruchi states she sent them to [facility]. Which labs were supposed to go?

## 2020-05-13 RX ORDER — ERGOCALCIFEROL 1.25 MG/1
CAPSULE ORAL
Qty: 4 CAPSULE | Refills: 11 | Status: SHIPPED | OUTPATIENT
Start: 2020-05-13 | End: 2022-01-31 | Stop reason: SDUPTHER

## 2020-05-15 LAB
25(OH)D3+25(OH)D2 SERPL-MCNC: 45.4 NG/ML (ref 30–100)
ALBUMIN SERPL-MCNC: 4.9 G/DL (ref 3.8–4.8)
ALBUMIN/GLOB SERPL: 2.5 {RATIO} (ref 1.2–2.2)
ALP SERPL-CCNC: 55 IU/L (ref 39–117)
ALT SERPL-CCNC: 9 IU/L (ref 0–32)
AST SERPL-CCNC: 16 IU/L (ref 0–40)
BASOPHILS # BLD AUTO: 0 X10E3/UL (ref 0–0.2)
BASOPHILS NFR BLD AUTO: 1 %
BILIRUB SERPL-MCNC: 0.7 MG/DL (ref 0–1.2)
BUN SERPL-MCNC: 17 MG/DL (ref 6–24)
BUN/CREAT SERPL: 20 (ref 9–23)
CALCIUM SERPL-MCNC: 10.1 MG/DL (ref 8.7–10.2)
CHLORIDE SERPL-SCNC: 102 MMOL/L (ref 96–106)
CHOLEST SERPL-MCNC: 224 MG/DL (ref 100–199)
CO2 SERPL-SCNC: 26 MMOL/L (ref 20–29)
CREAT SERPL-MCNC: 0.87 MG/DL (ref 0.57–1)
EOSINOPHIL # BLD AUTO: 0.1 X10E3/UL (ref 0–0.4)
EOSINOPHIL NFR BLD AUTO: 2 %
ERYTHROCYTE [DISTWIDTH] IN BLOOD BY AUTOMATED COUNT: 14.1 % (ref 11.7–15.4)
GLOBULIN SER CALC-MCNC: 2 G/DL (ref 1.5–4.5)
GLUCOSE SERPL-MCNC: 84 MG/DL (ref 65–99)
HCT VFR BLD AUTO: 39.9 % (ref 34–46.6)
HDLC SERPL-MCNC: 68 MG/DL
HGB BLD-MCNC: 12.9 G/DL (ref 11.1–15.9)
IMM GRANULOCYTES # BLD AUTO: 0 X10E3/UL (ref 0–0.1)
IMM GRANULOCYTES NFR BLD AUTO: 0 %
LDLC SERPL CALC-MCNC: 144 MG/DL (ref 0–99)
LYMPHOCYTES # BLD AUTO: 1.8 X10E3/UL (ref 0.7–3.1)
LYMPHOCYTES NFR BLD AUTO: 44 %
MCH RBC QN AUTO: 27.2 PG (ref 26.6–33)
MCHC RBC AUTO-ENTMCNC: 32.3 G/DL (ref 31.5–35.7)
MCV RBC AUTO: 84 FL (ref 79–97)
MONOCYTES # BLD AUTO: 0.3 X10E3/UL (ref 0.1–0.9)
MONOCYTES NFR BLD AUTO: 7 %
NEUTROPHILS # BLD AUTO: 1.9 X10E3/UL (ref 1.4–7)
NEUTROPHILS NFR BLD AUTO: 46 %
PLATELET # BLD AUTO: 283 X10E3/UL (ref 150–450)
POTASSIUM SERPL-SCNC: 4 MMOL/L (ref 3.5–5.2)
PROT SERPL-MCNC: 6.9 G/DL (ref 6–8.5)
RBC # BLD AUTO: 4.75 X10E6/UL (ref 3.77–5.28)
SODIUM SERPL-SCNC: 139 MMOL/L (ref 134–144)
TRIGL SERPL-MCNC: 61 MG/DL (ref 0–149)
TSH SERPL DL<=0.005 MIU/L-ACNC: 1.84 UIU/ML (ref 0.45–4.5)
VLDLC SERPL CALC-MCNC: 12 MG/DL (ref 5–40)
WBC # BLD AUTO: 4.1 X10E3/UL (ref 3.4–10.8)

## 2020-05-18 NOTE — PROGRESS NOTES
"Subjective:      Patient ID: Alexandra Rivas is a 49 y.o. female.    Chief Complaint: Follow-up      HPI  Here for f/u medical problems and preventive exam.  Energy good.  Exercising with walking 2-3x per week.  No f/c/sw/cough.  No cp/sob/palp.  BMs little slow lately.  Taking vit D.  Urine normal.  Sleeping ok.  Morning bottom of feet pain, better after walking about.  X 2 months.  Home since 3/16/20, yesterday restarted work.    HM: 11/19 fluvax, 11/19 HAV, 5/20 Td, 8/10 TDaP, 5/19 MMG/ Gyn Dr. Acevedo, 11/19 thyroid u/s neg to be repeated yearly.     Review of Systems   Constitutional: Negative for activity change, appetite change, chills, diaphoresis, fever and unexpected weight change.   HENT: Negative for congestion, ear pain, hearing loss, rhinorrhea, sinus pressure, sore throat and trouble swallowing.    Eyes: Negative for discharge and visual disturbance.   Respiratory: Negative for cough, chest tightness, shortness of breath and wheezing.    Cardiovascular: Negative for chest pain and palpitations.   Gastrointestinal: Negative for blood in stool, constipation, diarrhea, nausea and vomiting.   Endocrine: Negative for polydipsia and polyuria.   Genitourinary: Negative for difficulty urinating, dysuria, frequency, hematuria, menstrual problem, urgency and vaginal discharge.   Musculoskeletal: Positive for arthralgias. Negative for joint swelling and neck pain.   Skin: Negative for rash.   Neurological: Positive for headaches. Negative for dizziness and weakness.   Psychiatric/Behavioral: Negative for confusion, dysphoric mood and sleep disturbance. The patient is not nervous/anxious.          Objective:   /70 (BP Location: Left arm, Patient Position: Sitting, BP Method: Medium (Manual))   Pulse 78   Temp 98.1 °F (36.7 °C) (Oral)   Ht 5' 2" (1.575 m)   Wt 51.1 kg (112 lb 10.5 oz)   SpO2 100%   BMI 20.60 kg/m²     Physical Exam   Constitutional: She is oriented to person, place, and time. She " appears well-developed and well-nourished.   HENT:   Right Ear: External ear normal. Tympanic membrane is not injected.   Left Ear: External ear normal. Tympanic membrane is not injected.   Mouth/Throat: Oropharynx is clear and moist.   Eyes: Conjunctivae are normal.   Neck: Normal range of motion. Neck supple. No thyromegaly present.   Cardiovascular: Normal rate, regular rhythm and intact distal pulses. Exam reveals no gallop and no friction rub.   No murmur heard.  Pulmonary/Chest: Effort normal and breath sounds normal. She has no wheezes. She has no rales.   Abdominal: Soft. Bowel sounds are normal. She exhibits no mass. There is no tenderness.   Musculoskeletal: She exhibits no edema.   Lymphadenopathy:     She has no cervical adenopathy.   Neurological: She is alert and oriented to person, place, and time.   Skin: Skin is warm. No rash noted.   Psychiatric: She has a normal mood and affect.       Results for orders placed or performed in visit on 05/12/20   CBC auto differential   Result Value Ref Range    WBC 4.1 3.4 - 10.8 x10E3/uL    RBC 4.75 3.77 - 5.28 x10E6/uL    Hemoglobin 12.9 11.1 - 15.9 g/dL    Hematocrit 39.9 34.0 - 46.6 %    Mean Corpuscular Volume 84 79 - 97 fL    Mean Corpuscular Hemoglobin 27.2 26.6 - 33.0 pg    Mean Corpuscular Hemoglobin Conc 32.3 31.5 - 35.7 g/dL    RDW 14.1 11.7 - 15.4 %    Platelets 283 150 - 450 x10E3/uL    Neutrophils 46 Not Estab. %    Lymph% 44 Not Estab. %    Mono% 7 Not Estab. %    Eosinophil% 2 Not Estab. %    Basophil% 1 Not Estab. %    Neutrophils Absolute 1.9 1.4 - 7.0 x10E3/uL    Lymph # 1.8 0.7 - 3.1 x10E3/uL    Mono # 0.3 0.1 - 0.9 x10E3/uL    Eos # 0.1 0.0 - 0.4 x10E3/uL    Baso # 0.0 0.0 - 0.2 x10E3/uL    Immature Granulocytes 0 Not Estab. %    Immature Grans (Abs) 0.0 0.0 - 0.1 x10E3/uL   Comprehensive metabolic panel   Result Value Ref Range    Glucose 84 65 - 99 mg/dL    BUN, Bld 17 6 - 24 mg/dL    Creatinine 0.87 0.57 - 1.00 mg/dL    eGFR if non African  American 78 >59 mL/min/1.73    eGFR if African American 90 >59 mL/min/1.73    BUN/Creatinine Ratio 20 9 - 23    Sodium 139 134 - 144 mmol/L    Potassium 4.0 3.5 - 5.2 mmol/L    Chloride 102 96 - 106 mmol/L    CO2 26 20 - 29 mmol/L    Calcium 10.1 8.7 - 10.2 mg/dL    Total Protein 6.9 6.0 - 8.5 g/dL    Albumin 4.9 (H) 3.8 - 4.8 g/dL    Globulin, Total 2.0 1.5 - 4.5 g/dL    Albumin/Globulin Ratio 2.5 (H) 1.2 - 2.2    Total Bilirubin 0.7 0.0 - 1.2 mg/dL    Alkaline Phosphatase 55 39 - 117 IU/L    AST 16 0 - 40 IU/L    ALT 9 0 - 32 IU/L   Lipid Panel   Result Value Ref Range    Cholesterol 224 (H) 100 - 199 mg/dL    Triglycerides 61 0 - 149 mg/dL    HDL 68 >39 mg/dL    VLDL Cholesterol [place] 12 5 - 40 mg/dL    LDL Calculated 144 (H) 0 - 99 mg/dL   TSH   Result Value Ref Range    TSH 1.840 0.450 - 4.500 uIU/mL   Vitamin D   Result Value Ref Range    Vit D, 25-Hydroxy 45.4 30.0 - 100.0 ng/mL         Assessment:       1. Encounter for preventive health examination    2. Vitamin D deficiency disease    3. Iron deficiency anemia, unspecified iron deficiency anemia type    4. Migraine with aura and without status migrainosus, not intractable    5. Myofascial pain    6. FH: thyroid cancer    7. Plantar fasciitis    8. Other hyperlipidemia    9. Menopausal disorder    10. Chronic seasonal allergic rhinitis due to pollen          Plan:     Encounter for preventive health examination- utd.  Td now.  MMG/Gyn in July.  -     Mammo Digital Screening Bilat; Future; Expected date: 05/19/2020  -     Td Vaccine (Adult) - Preservative Free    Vitamin D deficiency disease- doing well, cont supp.    Iron deficiency anemia, unspecified iron deficiency anemia type- no Iron in 2y.    Migraine with aura and without status migrainosus, not intractable- doing well with amitrip.    Myofascial pain    FH: thyroid cancer- recheck in 11/20:  -     US Soft Tissue Head Neck Thyroid; Future; Expected date: 05/19/2020    Plantar fasciitis- discussed  exercising each morning, icing.  Call if not better 2-3 weeks.    10yr vasc risk 0.9%- cont exercise.

## 2020-05-19 ENCOUNTER — OFFICE VISIT (OUTPATIENT)
Dept: FAMILY MEDICINE | Facility: CLINIC | Age: 50
End: 2020-05-19
Payer: COMMERCIAL

## 2020-05-19 VITALS
BODY MASS INDEX: 20.73 KG/M2 | WEIGHT: 112.63 LBS | TEMPERATURE: 98 F | HEIGHT: 62 IN | SYSTOLIC BLOOD PRESSURE: 116 MMHG | DIASTOLIC BLOOD PRESSURE: 70 MMHG | HEART RATE: 78 BPM | OXYGEN SATURATION: 100 %

## 2020-05-19 DIAGNOSIS — E55.9 VITAMIN D DEFICIENCY DISEASE: ICD-10-CM

## 2020-05-19 DIAGNOSIS — J30.1 CHRONIC SEASONAL ALLERGIC RHINITIS DUE TO POLLEN: ICD-10-CM

## 2020-05-19 DIAGNOSIS — M72.2 PLANTAR FASCIITIS: ICD-10-CM

## 2020-05-19 DIAGNOSIS — M79.18 MYOFASCIAL PAIN: ICD-10-CM

## 2020-05-19 DIAGNOSIS — D50.9 IRON DEFICIENCY ANEMIA, UNSPECIFIED IRON DEFICIENCY ANEMIA TYPE: ICD-10-CM

## 2020-05-19 DIAGNOSIS — N95.9 MENOPAUSAL DISORDER: ICD-10-CM

## 2020-05-19 DIAGNOSIS — G43.109 MIGRAINE WITH AURA AND WITHOUT STATUS MIGRAINOSUS, NOT INTRACTABLE: ICD-10-CM

## 2020-05-19 DIAGNOSIS — Z00.00 ENCOUNTER FOR PREVENTIVE HEALTH EXAMINATION: Primary | ICD-10-CM

## 2020-05-19 DIAGNOSIS — Z80.8 FH: THYROID CANCER: ICD-10-CM

## 2020-05-19 DIAGNOSIS — E78.49 OTHER HYPERLIPIDEMIA: ICD-10-CM

## 2020-05-19 PROCEDURE — 99999 PR PBB SHADOW E&M-EST. PATIENT-LVL V: ICD-10-PCS | Mod: PBBFAC,,, | Performed by: INTERNAL MEDICINE

## 2020-05-19 PROCEDURE — 99396 PR PREVENTIVE VISIT,EST,40-64: ICD-10-PCS | Mod: 25,S$GLB,, | Performed by: INTERNAL MEDICINE

## 2020-05-19 PROCEDURE — 90471 TD VACCINE GREATER THAN OR EQUAL TO 7YO PRESERVATIVE FREE IM: ICD-10-PCS | Mod: S$GLB,,, | Performed by: INTERNAL MEDICINE

## 2020-05-19 PROCEDURE — 99999 PR PBB SHADOW E&M-EST. PATIENT-LVL V: CPT | Mod: PBBFAC,,, | Performed by: INTERNAL MEDICINE

## 2020-05-19 PROCEDURE — 90714 TD VACC NO PRESV 7 YRS+ IM: CPT | Mod: S$GLB,,, | Performed by: INTERNAL MEDICINE

## 2020-05-19 PROCEDURE — 90714 TD VACCINE GREATER THAN OR EQUAL TO 7YO PRESERVATIVE FREE IM: ICD-10-PCS | Mod: S$GLB,,, | Performed by: INTERNAL MEDICINE

## 2020-05-19 PROCEDURE — 90471 IMMUNIZATION ADMIN: CPT | Mod: S$GLB,,, | Performed by: INTERNAL MEDICINE

## 2020-05-19 PROCEDURE — 99396 PREV VISIT EST AGE 40-64: CPT | Mod: 25,S$GLB,, | Performed by: INTERNAL MEDICINE

## 2020-05-19 RX ORDER — FLUTICASONE PROPIONATE 50 MCG
2 SPRAY, SUSPENSION (ML) NASAL DAILY
Qty: 48 G | Refills: 2 | Status: SHIPPED | OUTPATIENT
Start: 2020-05-19 | End: 2022-01-31

## 2020-05-19 RX ORDER — AMITRIPTYLINE HYDROCHLORIDE 10 MG/1
10 TABLET, FILM COATED ORAL NIGHTLY PRN
Qty: 90 TABLET | Refills: 3 | Status: SHIPPED | OUTPATIENT
Start: 2020-05-19 | End: 2023-03-10 | Stop reason: SDUPTHER

## 2020-06-29 ENCOUNTER — OFFICE VISIT (OUTPATIENT)
Dept: PODIATRY | Facility: CLINIC | Age: 50
End: 2020-06-29
Payer: COMMERCIAL

## 2020-06-29 VITALS
SYSTOLIC BLOOD PRESSURE: 140 MMHG | HEART RATE: 71 BPM | HEIGHT: 62 IN | BODY MASS INDEX: 20.61 KG/M2 | DIASTOLIC BLOOD PRESSURE: 85 MMHG | WEIGHT: 112 LBS

## 2020-06-29 DIAGNOSIS — M72.2 PLANTAR FASCIITIS, BILATERAL: ICD-10-CM

## 2020-06-29 DIAGNOSIS — M76.62 ACHILLES TENDINITIS OF BOTH LOWER EXTREMITIES: Primary | ICD-10-CM

## 2020-06-29 DIAGNOSIS — M76.61 ACHILLES TENDINITIS OF BOTH LOWER EXTREMITIES: Primary | ICD-10-CM

## 2020-06-29 DIAGNOSIS — M79.673 INFLAMMATORY HEEL PAIN, UNSPECIFIED LATERALITY: ICD-10-CM

## 2020-06-29 DIAGNOSIS — M24.573 EQUINUS CONTRACTURE OF ANKLE: ICD-10-CM

## 2020-06-29 PROCEDURE — 99999 PR PBB SHADOW E&M-EST. PATIENT-LVL IV: CPT | Mod: PBBFAC,,, | Performed by: PODIATRIST

## 2020-06-29 PROCEDURE — 3008F PR BODY MASS INDEX (BMI) DOCUMENTED: ICD-10-PCS | Mod: CPTII,S$GLB,, | Performed by: PODIATRIST

## 2020-06-29 PROCEDURE — 3008F BODY MASS INDEX DOCD: CPT | Mod: CPTII,S$GLB,, | Performed by: PODIATRIST

## 2020-06-29 PROCEDURE — 99204 PR OFFICE/OUTPT VISIT, NEW, LEVL IV, 45-59 MIN: ICD-10-PCS | Mod: S$GLB,,, | Performed by: PODIATRIST

## 2020-06-29 PROCEDURE — 99999 PR PBB SHADOW E&M-EST. PATIENT-LVL IV: ICD-10-PCS | Mod: PBBFAC,,, | Performed by: PODIATRIST

## 2020-06-29 PROCEDURE — 99204 OFFICE O/P NEW MOD 45 MIN: CPT | Mod: S$GLB,,, | Performed by: PODIATRIST

## 2020-06-29 RX ORDER — METHYLPREDNISOLONE 4 MG/1
4 TABLET ORAL DAILY
Qty: 1 PACKAGE | Refills: 0 | Status: SHIPPED | OUTPATIENT
Start: 2020-06-29 | End: 2020-07-08

## 2020-06-29 NOTE — PROGRESS NOTES
Subjective:     Patient ID: Alexandra Rivas is a 49 y.o. female.    Chief Complaint: Foot Pain (c/o bilateral achilles pain. rates pain 6/10.  descirbers pain as soreness when first starting to walk. wears sandals. non-diabetic Pt. last seen on 05/19/20 with PCP Dr. Ojeda.)    Alexandra is a 49 y.o. female who presents to the clinic complaining of heel pain in both feet, especially with the first step in the morning. The pain is described as Aching, Throbbing and Tight. The onset of the pain was gradual and has worsened over the past several months. Alexandra rates the pain as 6/10. She reports a history of trauma. Prior treatments include home stretching exercises. Patient has no other pedal complaints.     Patient Active Problem List   Diagnosis    Stress incontinence    Migraine with aura and without status migrainosus, not intractable    FH: thyroid cancer    Vitamin D deficiency disease    Myofascial pain    Gastritis and duodenitis    Iron deficiency anemia    Chronic seasonal allergic rhinitis due to pollen    Iron deficiency anemia due to chronic blood loss    Menopausal disorder    Other hyperlipidemia       Medication List with Changes/Refills   New Medications    METHYLPREDNISOLONE (MEDROL DOSEPACK) 4 MG TABLET    Take 1 tablet (4 mg total) by mouth once daily. Use as instructed on dose pack   Current Medications    AMITRIPTYLINE (ELAVIL) 10 MG TABLET    Take 1 tablet (10 mg total) by mouth nightly as needed for Insomnia.    CHOLECALCIFEROL, VITAMIN D3, 2,000 UNIT CAP    Take 1 capsule (2,000 Units total) by mouth once daily.    CLOBETASOL (TEMOVATE) 0.05 % EXTERNAL SOLUTION    Apply to affected areas of scalp 1-2 times daily as needed for itch    DURAFLU 77--325 MG TAB    TAKE 1 TABLET BY MOUTH EVERY 4 HOURS FOR 7 DAYS    FLUTICASONE PROPIONATE (FLONASE) 50 MCG/ACTUATION NASAL SPRAY    2 sprays (100 mcg total) by Each Nostril route once daily.    KETOCONAZOLE (NIZORAL) 2 % SHAMPOO     Wash hair with medicated shampoo at least 1x/week - let sit on scalp at least 5 minutes prior to rinsing    LORATADINE (CLARITIN) 10 MG TABLET    Take 1 tablet (10 mg total) by mouth once daily.    OMEPRAZOLE (PRILOSEC) 40 MG CAPSULE    Take 1 capsule (40 mg total) by mouth once daily.    VITAMIN D2 1,250 MCG (50,000 UNIT) CAPSULE    Take 1 capsule by mouth once a week    VITAMIN E 400 UNIT CAPSULE    Take 1 capsule (400 Units total) by mouth once daily.       Review of patient's allergies indicates:   Allergen Reactions    Oxycodone-acetaminophen Itching       Past Surgical History:   Procedure Laterality Date    TONSILLECTOMY      TUBAL LIGATION         Family History   Problem Relation Age of Onset    Hypertension Mother     Aneurysm Father     Thyroid cancer Unknown         papillary, mother and sister 43y.    Hypertension Sister     Diabetes Paternal Grandmother     Psoriasis Maternal Grandmother     Breast cancer Neg Hx     Colon cancer Neg Hx     Ovarian cancer Neg Hx     Melanoma Neg Hx     Lupus Neg Hx     Eczema Neg Hx     Thrombophilia Neg Hx        Social History     Socioeconomic History    Marital status:      Spouse name: Not on file    Number of children: Not on file    Years of education: Not on file    Highest education level: Not on file   Occupational History    Not on file   Social Needs    Financial resource strain: Not hard at all    Food insecurity     Worry: Never true     Inability: Never true    Transportation needs     Medical: No     Non-medical: No   Tobacco Use    Smoking status: Never Smoker    Smokeless tobacco: Never Used   Substance and Sexual Activity    Alcohol use: Yes     Alcohol/week: 0.0 standard drinks     Frequency: Monthly or less     Drinks per session: 1 or 2     Binge frequency: Never     Comment: occasionally    Drug use: No    Sexual activity: Yes     Partners: Male     Birth control/protection: Surgical     Comment: BTL; mut  "monog   Lifestyle    Physical activity     Days per week: 0 days     Minutes per session: 0 min    Stress: Only a little   Relationships    Social connections     Talks on phone: More than three times a week     Gets together: Once a week     Attends Nondenominational service: Not on file     Active member of club or organization: No     Attends meetings of clubs or organizations: Never     Relationship status:    Other Topics Concern    Are you pregnant or think you may be? No    Breast-feeding No   Social History Narrative    Not on file       Vitals:    06/29/20 0853   BP: (!) 140/85   Pulse: 71   Weight: 50.8 kg (112 lb)   Height: 5' 2" (1.575 m)   PainSc:   6   PainLoc: Foot           Review of Systems   Constitutional: Negative for chills and fever.   Respiratory: Negative for shortness of breath.    Cardiovascular: Negative for chest pain, palpitations, orthopnea, claudication and leg swelling.   Gastrointestinal: Negative for diarrhea, nausea and vomiting.   Musculoskeletal: Negative for joint pain.   Skin: Negative for rash.   Neurological: Negative for dizziness, tingling, sensory change, focal weakness and weakness.   Psychiatric/Behavioral: Negative.              Objective:   PHYSICAL EXAM: Apperance: Alert and orient in no distress,well developed, and with good attention to grooming and body habits  Patient presents ambulating in wedges.  Lower Extremity Exam  VASCULAR: Dorsalis pedis pulses 2/4 bilateral and Posterior Tibial pulses 2/4 bilateral. Capillary fill time <4 seconds bilateral. No edema observed bilateral. Varicosities absent bilateral. Skin temperature of the lower extremities is warm to warm, proximal to distal. Hair growth WNL bilateral.  DERMATOLOGICAL: No skin rashes, subcutaneous nodules, lesions, or ulcers observed bilateral.   NEUROLOGICAL: Light touch, sharp-dull, proprioception all present and equal bilaterally.    MUSCULOSKELETAL: Muscle strength 5/5 for all foot inverters, " everters, plantarflexors, and dorsiflexors bilateral. Ankle joints bilateral shows decreased ROM. bilateral ankle ROM shows greater decrease in dorsiflexion with knee extended. Ankle joint ROM is pain free and without crepitus bilateral. Pain to palpation bilateral plantar medial tubercle. Plantar medial aspect of bilateral heels shows tenderness to palpation. No pain on medial-lateral compression of the calcaneus. Tenderness with palpation of bilateral posterior 1/3 calcaneus at region of Achilles tendon insertion.          Assessment:   The following prescriptions/orders were written today per listed diagnosis  Achilles tendinitis of both lower extremities  -     methylPREDNISolone (MEDROL DOSEPACK) 4 mg tablet; Take 1 tablet (4 mg total) by mouth once daily. Use as instructed on dose pack  Dispense: 1 Package; Refill: 0    Plantar fasciitis, bilateral  -     methylPREDNISolone (MEDROL DOSEPACK) 4 mg tablet; Take 1 tablet (4 mg total) by mouth once daily. Use as instructed on dose pack  Dispense: 1 Package; Refill: 0    Inflammatory heel pain, unspecified laterality  -     methylPREDNISolone (MEDROL DOSEPACK) 4 mg tablet; Take 1 tablet (4 mg total) by mouth once daily. Use as instructed on dose pack  Dispense: 1 Package; Refill: 0    Equinus contracture of ankle          Plan:   Achilles tendinitis of both lower extremities  -     methylPREDNISolone (MEDROL DOSEPACK) 4 mg tablet; Take 1 tablet (4 mg total) by mouth once daily. Use as instructed on dose pack  Dispense: 1 Package; Refill: 0    Plantar fasciitis, bilateral  -     methylPREDNISolone (MEDROL DOSEPACK) 4 mg tablet; Take 1 tablet (4 mg total) by mouth once daily. Use as instructed on dose pack  Dispense: 1 Package; Refill: 0    Inflammatory heel pain, unspecified laterality  -     methylPREDNISolone (MEDROL DOSEPACK) 4 mg tablet; Take 1 tablet (4 mg total) by mouth once daily. Use as instructed on dose pack  Dispense: 1 Package; Refill: 0    Equinus  contracture of ankle      I counseled the patient on her conditions, regarding findings of my examination, my impressions, and usual treatment plan.   I explained to the patient that etiology and treatment options for heel pain including rest,  ice messages, stretching exercises, strappings/tappings, NSAID's, injections, new shoegear with orthotic inserts, and/or surgical treatment.   Patient agreed to stretching exercises and oral medication.   I gave written and verbal instructions on heel cord stretching and this was demonstrated for the patient. Patient expressed understanding.  Prescribed Medrol Dosepak to be taken as directed on package. Discussed possible increase in blood sugar with taking steroid medication. Patient advised on the possible elevation of blood pressure sugar and caution to take pills as prescribed and to discontinue use if symptoms arise, patient agreed.   Patient instructed on adequate icing techniques. Patient should ice the affected area at least once per day x 10 minutes for 10 days . I advised the  patient that extra icing would also be beneficial to ensure adequate anti inflammatory effect.   The patient and I reviewed the types of shoes she should be wearing, my recommendation includes generally the best time of the day for a shoe fitting is the afternoon, shoes with a wide toe box, very good cushion, and tennis shoes with removable inner soles. The patient and I reviewed my recommendations for over-the-counter orthotic inserts.   Patient to return in 6 weeks.                   Reij Hinojosa DPM  [facility]

## 2020-06-29 NOTE — PATIENT INSTRUCTIONS
Understanding Plantar Fasciitis    Plantar fasciitis is a condition that causes foot and heel pain. The plantar fascia is a tough band of tissue that runs across the bottom of the foot from the heel to the toes. This tissue pulls on the heel bone. It supports the arch of the foot as it pushes off the ground. If the tissue becomes irritated or red and swollen (inflamed), it is called plantar fasciitis.  How to say it  PLAN-tuhr [id number]   What causes plantar fasciitis?  Plantar fasciitis most often occurs from overusing the plantar fascia. The tissue may become damaged from activities that put repeated stress on the heel and foot. Or it may wear down over time with age and ankle stiffness. You are more likely to have plantar fasciitis if you:  · Do activities that require a lot of running, jumping, or dancing  · Have a job that requires being on your feet for long periods  · Are overweight or obese  · Have certain foot problems, such as a tight Achilles tendon, flat feet, or high arches  · Often wear poorly fitting shoes  Symptoms of plantar fasciitis  The condition most often causes pain in the heel and the bottom of the foot. The pain may occur when you take your first steps in the morning. It may get better as you walk throughout the day. But as you continue to put weight on the foot, the pain often returns. Pain may also occur after standing or sitting for long periods.  Treating plantar fasciitis  Treatments for plantar fasciitis include:  · Resting the foot. This involves limiting movements that make your foot hurt. You may also need to avoid certain sports and types of work for a time.  · Using cold packs. Put an ice pack on the heel and foot to help reduce pain and swelling.  · Taking pain medicines. Prescription and over-the-counter pain medicines can help relieve pain and swelling.  · Using heel cups or foot inserts (orthotics). These are placed in the shoes to help support the heel or arch and  cushion the heel. You may also be told to buy proper-fitting shoes with good arch support and cushioned soles.  · Taping the foot. This supports the arch and limits the movement of the plantar fascia to help relieve symptoms.  · Wearing a night splint. This stretches the plantar fascia and leg muscles while you sleep. This may help relieve pain.  · Doing exercises and physical therapy. These stretch and strengthen the plantar fascia and the muscles in the leg that support the heel and foot.  · Getting shots of medicine into the foot. These may help relieve symptoms for a time.  · Having surgery. This may be needed if other treatments fail to relieve symptoms. During surgery, the surgeon may partially cut the plantar fascia to release tension.  Possible complications of plantar fasciitis  Without proper care and treatment, healing may take longer than normal. Also, symptoms may continue or get worse. Over time, the plantar fascia may be damaged. This can make it hard to walk or even stand without pain.  When to call your healthcare provider  Call your healthcare provider right away if you have any of these:  · Fever of 100.4°F (38°C) or higher, or as directed  · Symptoms that dont get better with treatment, or get worse  · New symptoms, such as numbness, tingling, or weakness in the foot   Date Last Reviewed: 3/10/2016  © 5044-9053 [employer], [employer]. [address] [place] [address], [place] [address]. All rights reserved. This information is not intended as a substitute for professional medical care. Always follow your healthcare professional's instructions.

## 2020-07-08 ENCOUNTER — HOSPITAL ENCOUNTER (OUTPATIENT)
Dept: RADIOLOGY | Facility: HOSPITAL | Age: 50
Discharge: HOME | End: 2020-07-08
Attending: INTERNAL MEDICINE
Payer: COMMERCIAL

## 2020-07-08 ENCOUNTER — OFFICE VISIT (OUTPATIENT)
Dept: OBSTETRICS AND GYNECOLOGY | Facility: CLINIC | Age: 50
End: 2020-07-08
Payer: COMMERCIAL

## 2020-07-08 VITALS
SYSTOLIC BLOOD PRESSURE: 116 MMHG | WEIGHT: 112 LBS | HEIGHT: 62 IN | WEIGHT: 112 LBS | DIASTOLIC BLOOD PRESSURE: 70 MMHG | HEIGHT: 62 IN | BODY MASS INDEX: 20.61 KG/M2 | BODY MASS INDEX: 20.61 KG/M2

## 2020-07-08 DIAGNOSIS — N95.9 MENOPAUSAL DISORDER: ICD-10-CM

## 2020-07-08 DIAGNOSIS — N94.10 DYSPAREUNIA IN FEMALE: ICD-10-CM

## 2020-07-08 DIAGNOSIS — Z01.419 ENCOUNTER FOR GYNECOLOGICAL EXAMINATION WITHOUT ABNORMAL FINDING: Primary | ICD-10-CM

## 2020-07-08 DIAGNOSIS — Z00.00 ENCOUNTER FOR PREVENTIVE HEALTH EXAMINATION: ICD-10-CM

## 2020-07-08 DIAGNOSIS — N89.8 VAGINAL ODOR: ICD-10-CM

## 2020-07-08 PROCEDURE — 77067 SCR MAMMO BI INCL CAD: CPT | Mod: TC

## 2020-07-08 PROCEDURE — 77063 MAMMO DIGITAL SCREENING BILAT WITH TOMOSYNTHESIS_CAD: ICD-10-PCS | Mod: 26,,, | Performed by: RADIOLOGY

## 2020-07-08 PROCEDURE — 99396 PREV VISIT EST AGE 40-64: CPT | Mod: S$GLB,,, | Performed by: OBSTETRICS & GYNECOLOGY

## 2020-07-08 PROCEDURE — 99999 PR PBB SHADOW E&M-EST. PATIENT-LVL III: ICD-10-PCS | Mod: PBBFAC,,, | Performed by: OBSTETRICS & GYNECOLOGY

## 2020-07-08 PROCEDURE — 87210 PR  SMEAR,STAIN,WET MNT,INTERP: ICD-10-PCS | Mod: QW,S$GLB,, | Performed by: OBSTETRICS & GYNECOLOGY

## 2020-07-08 PROCEDURE — 99396 PR PREVENTIVE VISIT,EST,40-64: ICD-10-PCS | Mod: S$GLB,,, | Performed by: OBSTETRICS & GYNECOLOGY

## 2020-07-08 PROCEDURE — 77067 MAMMO DIGITAL SCREENING BILAT WITH TOMOSYNTHESIS_CAD: ICD-10-PCS | Mod: 26,,, | Performed by: RADIOLOGY

## 2020-07-08 PROCEDURE — 77067 SCR MAMMO BI INCL CAD: CPT | Mod: 26,,, | Performed by: RADIOLOGY

## 2020-07-08 PROCEDURE — 77063 BREAST TOMOSYNTHESIS BI: CPT | Mod: 26,,, | Performed by: RADIOLOGY

## 2020-07-08 PROCEDURE — 87210 SMEAR WET MOUNT SALINE/INK: CPT | Mod: QW,S$GLB,, | Performed by: OBSTETRICS & GYNECOLOGY

## 2020-07-08 PROCEDURE — 99999 PR PBB SHADOW E&M-EST. PATIENT-LVL III: CPT | Mod: PBBFAC,,, | Performed by: OBSTETRICS & GYNECOLOGY

## 2020-07-08 RX ORDER — ESTRADIOL 10 UG/1
10 INSERT VAGINAL DAILY
Qty: 30 TABLET | Refills: 1 | Status: SHIPPED | OUTPATIENT
Start: 2020-07-08 | End: 2023-03-10

## 2020-07-08 NOTE — PROGRESS NOTES
"CC: Well woman exam     Alexandra Rivas is a 49 y.o. female  presents for a well woman exam.  LMP: Patient's last menstrual period was 2017 (approximate).. She has complaints of dyspareunia and pink bleeding with intercourse. She also complains of vaginal odor and vaginal dryness. She states she is having hot flashes and night sweats, but they are manageable. She is currently using Replens lubrication during intercourse with no relief in symptoms.  She is interested in vaginal estrogen replacement at this time.    Past Medical History:   Diagnosis Date    Allergic rhinitis     FH: thyroid cancer     Gastritis and duodenitis 2016    Headache, classical migraine     occasional aura "every now and then"    Vitamin D deficiency disease      Past Surgical History:   Procedure Laterality Date    TONSILLECTOMY      TUBAL LIGATION       Social History     Socioeconomic History    Marital status:      Spouse name: Not on file    Number of children: Not on file    Years of education: Not on file    Highest education level: Not on file   Occupational History    Not on file   Social Needs    Financial resource strain: Not hard at all    Food insecurity     Worry: Never true     Inability: Never true    Transportation needs     Medical: No     Non-medical: No   Tobacco Use    Smoking status: Never Smoker    Smokeless tobacco: Never Used   Substance and Sexual Activity    Alcohol use: Yes     Alcohol/week: 0.0 standard drinks     Frequency: Monthly or less     Drinks per session: 1 or 2     Binge frequency: Never     Comment: occasionally    Drug use: No    Sexual activity: Yes     Partners: Male     Birth control/protection: Surgical     Comment: BTL; mut monog   Lifestyle    Physical activity     Days per week: 0 days     Minutes per session: 0 min    Stress: Only a little   Relationships    Social connections     Talks on phone: More than three times a week     Gets " together: Once a week     Attends Rastafari service: Not on file     Active member of club or organization: No     Attends meetings of clubs or organizations: Never     Relationship status:    Other Topics Concern    Are you pregnant or think you may be? No    Breast-feeding No   Social History Narrative    Not on file     Family History   Problem Relation Age of Onset    Hypertension Mother     Psoriasis Mother     Aneurysm Father     Thyroid cancer Unknown         papillary, mother and sister 43y.    Hypertension Sister     Psoriasis Sister     Diabetes Paternal Grandmother     Psoriasis Maternal Grandmother     Breast cancer Neg Hx     Colon cancer Neg Hx     Ovarian cancer Neg Hx     Melanoma Neg Hx     Lupus Neg Hx     Eczema Neg Hx     Thrombophilia Neg Hx      OB History        1    Para   1    Term   1            AB        Living   1       SAB        TAB        Ectopic        Multiple        Live Births   1                 Current Outpatient Medications:     amitriptyline (ELAVIL) 10 MG tablet, Take 1 tablet (10 mg total) by mouth nightly as needed for Insomnia., Disp: 90 tablet, Rfl: 3    clobetasol (TEMOVATE) 0.05 % external solution, Apply to affected areas of scalp 1-2 times daily as needed for itch, Disp: 50 mL, Rfl: 3    fluticasone propionate (FLONASE) 50 mcg/actuation nasal spray, 2 sprays (100 mcg total) by Each Nostril route once daily. (Patient taking differently: 2 sprays by Each Nostril route as needed. ), Disp: 48 g, Rfl: 2    ketoconazole (NIZORAL) 2 % shampoo, Wash hair with medicated shampoo at least 1x/week - let sit on scalp at least 5 minutes prior to rinsing, Disp: 120 mL, Rfl: 5    omeprazole (PRILOSEC) 40 MG capsule, Take 1 capsule (40 mg total) by mouth once daily., Disp: 90 capsule, Rfl: 3    VITAMIN D2 1,250 mcg (50,000 unit) capsule, Take 1 capsule by mouth once a week, Disp: 4 capsule, Rfl: 11    vitamin E 400 UNIT capsule, Take 1  "capsule (400 Units total) by mouth once daily., Disp: , Rfl:     estradioL (VAGIFEM) 10 mcg Tab, Place 1 tablet (10 mcg total) vaginally once daily. One tablet intravaginal x 2 weeks then 2-3 times per week., Disp: 30 tablet, Rfl: 1    GYNECOLOGY HISTORY:  No hx of STD, No abnormal paps    DATA REVIEWED:  Last pap: WNL Date: 4/5/18  Last mmg: WNL Date: 5/21/19  Last colonoscopy: WNL Date: 2013    /70   Ht 5' 2" (1.575 m)   Wt 50.8 kg (111 lb 15.9 oz)   LMP 12/23/2017 (Approximate)   BMI 20.48 kg/m²     ROS:  GENERAL: Denies weight gain or weight loss. Feeling well overall.   SKIN: Denies rash or lesions.   HEAD: Denies head injury or headache.   NODES: Denies enlarged lymph nodes.   CHEST: Denies chest pain or shortness of breath.   CARDIOVASCULAR: Denies palpitations or left sided chest pain.   ABDOMEN: No abdominal pain, constipation, diarrhea, nausea, vomiting or rectal bleeding.   URINARY: No frequency, dysuria, hematuria, or burning on urination.  REPRODUCTIVE: See HPI.   BREASTS: The patient denies pain, lumps, or nipple discharge.   HEMATOLOGIC: No easy bruisability or excessive bleeding.   MUSCULOSKELETAL: Denies joint pain or swelling.   NEUROLOGIC: Denies syncope or weakness.   PSYCHIATRIC: Denies depression, anxiety or mood swings.    PHYSICAL EXAM:    APPEARANCE: Well nourished, well developed, in no acute distress.  AFFECT: WNL, alert and oriented x 3  SKIN: No acne or hirsutism  NECK: Neck symmetric without masses or thyromegaly  NODES: No inguinal, cervical, axillary, or femoral lymph node enlargement  CHEST: Good respiratory effect  ABDOMEN: Soft.  No tenderness or masses.  No hepatosplenomegaly.  No hernias.  BREASTS: Symmetrical, no skin changes or visible lesions.  No palpable masses, nipple discharge bilaterally.  PELVIC: Normal external genitalia without lesions.  Normal hair distribution.  Adequate perineal body, normal urethral meatus.  Vagina non-atrophic without lesions, (+) milky " yellow tinged discharge.  Cervix pink, without lesions, discharge or tenderness.  No significant cystocele or rectocele.  Bimanual exam shows uterus to be normal size, regular, mobile and nontender.  Adnexa without masses or tenderness.   EXTREMITIES: No edema.    Wet prep neg    Encounter for gynecological examination without abnormal finding  -     Liquid-Based Pap Smear, Screening  -     HPV High Risk Genotypes, PCR    Dyspareunia in female  -     estradioL (VAGIFEM) 10 mcg Tab; Place 1 tablet (10 mcg total) vaginally once daily. One tablet intravaginal x 2 weeks then 2-3 times per week.  Dispense: 30 tablet; Refill: 1    Vaginal odor  -     POCT WET PREP    Menopausal disorder    Patient was counseled today on A.C.S. Pap guidelines (q5) and recommendations for yearly pelvic exams, yearly mammograms starting age 40, and clinical breast exams; to see her PCP for other health maintenance.     Education provided on perineal massage for improvement of introital dyspareunia. R/b of HRT

## 2020-07-14 NOTE — PROGRESS NOTES
"Subjective:      Patient ID: Alexandra Rivas is a 49 y.o. female.    Chief Complaint: Back Pain and Fall      HPI  Pt here for follow up of medical problems and slipped and fell down stairs, striking left buttock on multiple steps 10 days ago.  Aleve and heat, but having ongoing severe pain mostly left buttock and bilateral ribs posteriorly.  Sometimes pain shoots down left leg laterally.  B/B function normal.  Itches with oxycodone.       Updated/ annual due 5/21:  HM: 11/19 fluvax, 11/19 HAV, 5/20 Td, 8/10 TDaP, 5/19 MMG/ Gyn Dr. Acevedo, 11/19 thyroid u/s neg to be repeated yearly.     Review of Systems   Constitutional: Negative for chills, diaphoresis and fever.   Respiratory: Negative for cough and shortness of breath.    Cardiovascular: Negative for chest pain, palpitations and leg swelling.   Gastrointestinal: Negative for blood in stool, constipation, diarrhea, nausea and vomiting.   Genitourinary: Negative for dysuria, frequency and hematuria.   Psychiatric/Behavioral: The patient is not nervous/anxious.          Objective:   /82   Pulse 85   Temp 98.4 °F (36.9 °C)   Ht 5' 2" (1.575 m)   Wt 50.9 kg (112 lb 3.4 oz)   LMP 12/23/2017 (Approximate)   SpO2 100%   BMI 20.52 kg/m²     Physical Exam  Constitutional:       Appearance: She is well-developed.   Neck:      Musculoskeletal: Neck supple.      Thyroid: No thyroid mass.      Vascular: No carotid bruit.   Cardiovascular:      Rate and Rhythm: Normal rate and regular rhythm.      Heart sounds: No murmur. No friction rub. No gallop.    Pulmonary:      Effort: Pulmonary effort is normal.      Breath sounds: Normal breath sounds. No wheezing or rales.   Abdominal:      General: Bowel sounds are normal.      Palpations: Abdomen is soft. There is no mass.      Tenderness: There is no abdominal tenderness.   Musculoskeletal:      Lumbar back: She exhibits tenderness (coccyx).   Lymphadenopathy:      Cervical: No cervical adenopathy. "   Neurological:      Mental Status: She is alert and oriented to person, place, and time.      Deep Tendon Reflexes:      Reflex Scores:       Patellar reflexes are 2+ on the right side and 2+ on the left side.            Assessment:       1. Back pain with left-sided sciatica          Plan:     Back pain with left-sided sciatica  -     X-Ray Ribs 2 View Right; Future; Expected date: 07/15/2020  -     X-Ray Ribs 2 View Left; Future; Expected date: 07/15/2020  -     X-Ray Lumbar Spine AP And Lateral; Future; Expected date: 07/15/2020  -     traMADoL (ULTRAM) 50 mg tablet; Take 1 tablet (50 mg total) by mouth every 6 (six) hours.  Dispense: 20 each; Refill: 0  -     cyclobenzaprine (FLEXERIL) 10 MG tablet; Take 1 tablet (10 mg total) by mouth 3 (three) times daily as needed for Muscle spasms.  Dispense: 30 tablet; Refill: 6

## 2020-07-15 ENCOUNTER — PATIENT MESSAGE (OUTPATIENT)
Dept: FAMILY MEDICINE | Facility: CLINIC | Age: 50
End: 2020-07-15

## 2020-07-15 ENCOUNTER — OFFICE VISIT (OUTPATIENT)
Dept: FAMILY MEDICINE | Facility: CLINIC | Age: 50
End: 2020-07-15
Payer: COMMERCIAL

## 2020-07-15 ENCOUNTER — HOSPITAL ENCOUNTER (OUTPATIENT)
Dept: RADIOLOGY | Facility: HOSPITAL | Age: 50
Discharge: HOME | End: 2020-07-15
Attending: INTERNAL MEDICINE
Payer: COMMERCIAL

## 2020-07-15 VITALS
SYSTOLIC BLOOD PRESSURE: 126 MMHG | OXYGEN SATURATION: 100 % | DIASTOLIC BLOOD PRESSURE: 82 MMHG | TEMPERATURE: 98 F | HEIGHT: 62 IN | WEIGHT: 112.19 LBS | HEART RATE: 85 BPM | BODY MASS INDEX: 20.65 KG/M2

## 2020-07-15 DIAGNOSIS — M54.32 BACK PAIN WITH LEFT-SIDED SCIATICA: ICD-10-CM

## 2020-07-15 DIAGNOSIS — M54.32 BACK PAIN WITH LEFT-SIDED SCIATICA: Primary | ICD-10-CM

## 2020-07-15 PROCEDURE — 72100 X-RAY EXAM L-S SPINE 2/3 VWS: CPT | Mod: 26,,, | Performed by: RADIOLOGY

## 2020-07-15 PROCEDURE — 99999 PR PBB SHADOW E&M-EST. PATIENT-LVL IV: ICD-10-PCS | Mod: PBBFAC,,, | Performed by: INTERNAL MEDICINE

## 2020-07-15 PROCEDURE — 71110 X-RAY EXAM RIBS BIL 3 VIEWS: CPT | Mod: TC,FY,PO

## 2020-07-15 PROCEDURE — 72100 XR LUMBAR SPINE AP AND LATERAL: ICD-10-PCS | Mod: 26,,, | Performed by: RADIOLOGY

## 2020-07-15 PROCEDURE — 71110 X-RAY EXAM RIBS BIL 3 VIEWS: CPT | Mod: 26,,, | Performed by: RADIOLOGY

## 2020-07-15 PROCEDURE — 71110 XR RIBS 3 VIEWS BILATERAL: ICD-10-PCS | Mod: 26,,, | Performed by: RADIOLOGY

## 2020-07-15 PROCEDURE — 99213 PR OFFICE/OUTPT VISIT, EST, LEVL III, 20-29 MIN: ICD-10-PCS | Mod: S$GLB,,, | Performed by: INTERNAL MEDICINE

## 2020-07-15 PROCEDURE — 99999 PR PBB SHADOW E&M-EST. PATIENT-LVL IV: CPT | Mod: PBBFAC,,, | Performed by: INTERNAL MEDICINE

## 2020-07-15 PROCEDURE — 99213 OFFICE O/P EST LOW 20 MIN: CPT | Mod: S$GLB,,, | Performed by: INTERNAL MEDICINE

## 2020-07-15 PROCEDURE — 3008F BODY MASS INDEX DOCD: CPT | Mod: CPTII,S$GLB,, | Performed by: INTERNAL MEDICINE

## 2020-07-15 PROCEDURE — 3008F PR BODY MASS INDEX (BMI) DOCUMENTED: ICD-10-PCS | Mod: CPTII,S$GLB,, | Performed by: INTERNAL MEDICINE

## 2020-07-15 PROCEDURE — 72100 X-RAY EXAM L-S SPINE 2/3 VWS: CPT | Mod: TC,FY,PO

## 2020-07-15 RX ORDER — CYCLOBENZAPRINE HCL 10 MG
10 TABLET ORAL 3 TIMES DAILY PRN
Qty: 30 TABLET | Refills: 6 | Status: SHIPPED | OUTPATIENT
Start: 2020-07-15 | End: 2020-07-25

## 2020-07-15 RX ORDER — TRAMADOL HYDROCHLORIDE 50 MG/1
50 TABLET ORAL EVERY 6 HOURS
Qty: 20 EACH | Refills: 0 | Status: SHIPPED | OUTPATIENT
Start: 2020-07-15 | End: 2022-01-31

## 2020-08-24 ENCOUNTER — OFFICE VISIT (OUTPATIENT)
Dept: PODIATRY | Facility: CLINIC | Age: 50
End: 2020-08-24
Payer: COMMERCIAL

## 2020-08-24 VITALS
SYSTOLIC BLOOD PRESSURE: 123 MMHG | DIASTOLIC BLOOD PRESSURE: 78 MMHG | HEIGHT: 62 IN | WEIGHT: 112.19 LBS | BODY MASS INDEX: 20.65 KG/M2 | HEART RATE: 84 BPM

## 2020-08-24 DIAGNOSIS — M76.62 ACHILLES TENDINITIS OF BOTH LOWER EXTREMITIES: Primary | ICD-10-CM

## 2020-08-24 DIAGNOSIS — M72.2 PLANTAR FASCIITIS, BILATERAL: ICD-10-CM

## 2020-08-24 DIAGNOSIS — M24.573 EQUINUS CONTRACTURE OF ANKLE: ICD-10-CM

## 2020-08-24 DIAGNOSIS — M76.61 ACHILLES TENDINITIS OF BOTH LOWER EXTREMITIES: Primary | ICD-10-CM

## 2020-08-24 PROCEDURE — 99213 OFFICE O/P EST LOW 20 MIN: CPT | Mod: S$GLB,,, | Performed by: PODIATRIST

## 2020-08-24 PROCEDURE — 99213 PR OFFICE/OUTPT VISIT, EST, LEVL III, 20-29 MIN: ICD-10-PCS | Mod: S$GLB,,, | Performed by: PODIATRIST

## 2020-08-24 PROCEDURE — 3008F PR BODY MASS INDEX (BMI) DOCUMENTED: ICD-10-PCS | Mod: CPTII,S$GLB,, | Performed by: PODIATRIST

## 2020-08-24 PROCEDURE — 3008F BODY MASS INDEX DOCD: CPT | Mod: CPTII,S$GLB,, | Performed by: PODIATRIST

## 2020-08-24 PROCEDURE — 99999 PR PBB SHADOW E&M-EST. PATIENT-LVL III: CPT | Mod: PBBFAC,,, | Performed by: PODIATRIST

## 2020-08-24 PROCEDURE — 99999 PR PBB SHADOW E&M-EST. PATIENT-LVL III: ICD-10-PCS | Mod: PBBFAC,,, | Performed by: PODIATRIST

## 2020-08-24 RX ORDER — MELOXICAM 15 MG/1
15 TABLET ORAL DAILY
Qty: 30 TABLET | Refills: 0 | Status: SHIPPED | OUTPATIENT
Start: 2020-08-24 | End: 2022-01-31

## 2020-08-24 NOTE — PROGRESS NOTES
Subjective:     Patient ID: Alexandra Rivas is a 50 y.o. female.    Chief Complaint: Follow-up (F/U B/L foot pain.Rates pain 6/10 Pt states improvement since last visit. Pain still there in the morning. Non diabetic Pt. Wears tennis shoes.PCP DR Ojeda)    Alexandra is a 50 y.o. female who presents to the clinic for follow up of  heel pain in both feet, especially with the first step in the morning. The pain is described as tight. The onset of the pain was gradual improved over the past several weeks. Alexandra rates the pain as 6/10. She reports a history of trauma. Prior treatments include steroid pack and home stretching exercises. Patient has no other pedal complaints.     Patient Active Problem List   Diagnosis    Stress incontinence    Migraine with aura and without status migrainosus, not intractable    FH: thyroid cancer    Vitamin D deficiency disease    Myofascial pain    Gastritis and duodenitis    Iron deficiency anemia    Chronic seasonal allergic rhinitis due to pollen    Iron deficiency anemia due to chronic blood loss    Menopausal disorder    Other hyperlipidemia       Medication List with Changes/Refills   New Medications    MELOXICAM (MOBIC) 15 MG TABLET    Take 1 tablet (15 mg total) by mouth once daily.   Current Medications    AMITRIPTYLINE (ELAVIL) 10 MG TABLET    Take 1 tablet (10 mg total) by mouth nightly as needed for Insomnia.    CLOBETASOL (TEMOVATE) 0.05 % EXTERNAL SOLUTION    Apply to affected areas of scalp 1-2 times daily as needed for itch    ESTRADIOL (VAGIFEM) 10 MCG TAB    Place 1 tablet (10 mcg total) vaginally once daily. One tablet intravaginal x 2 weeks then 2-3 times per week.    FLUTICASONE PROPIONATE (FLONASE) 50 MCG/ACTUATION NASAL SPRAY    2 sprays (100 mcg total) by Each Nostril route once daily.    KETOCONAZOLE (NIZORAL) 2 % SHAMPOO    Wash hair with medicated shampoo at least 1x/week - let sit on scalp at least 5 minutes prior to rinsing    OMEPRAZOLE  (PRILOSEC) 40 MG CAPSULE    Take 1 capsule (40 mg total) by mouth once daily.    TRAMADOL (ULTRAM) 50 MG TABLET    Take 1 tablet (50 mg total) by mouth every 6 (six) hours.    VITAMIN D2 1,250 MCG (50,000 UNIT) CAPSULE    Take 1 capsule by mouth once a week    VITAMIN E 400 UNIT CAPSULE    Take 1 capsule (400 Units total) by mouth once daily.       Review of patient's allergies indicates:   Allergen Reactions    Oxycodone-acetaminophen Itching       Past Surgical History:   Procedure Laterality Date    TONSILLECTOMY      TUBAL LIGATION         Family History   Problem Relation Age of Onset    Hypertension Mother     Psoriasis Mother     Aneurysm Father     Thyroid cancer Unknown         papillary, mother and sister 43y.    Hypertension Sister     Psoriasis Sister     Diabetes Paternal Grandmother     Psoriasis Maternal Grandmother     Breast cancer Neg Hx     Colon cancer Neg Hx     Ovarian cancer Neg Hx     Melanoma Neg Hx     Lupus Neg Hx     Eczema Neg Hx     Thrombophilia Neg Hx        Social History     Socioeconomic History    Marital status:      Spouse name: Not on file    Number of children: Not on file    Years of education: Not on file    Highest education level: Not on file   Occupational History    Not on file   Social Needs    Financial resource strain: Not hard at all    Food insecurity     Worry: Never true     Inability: Never true    Transportation needs     Medical: No     Non-medical: No   Tobacco Use    Smoking status: Never Smoker    Smokeless tobacco: Never Used   Substance and Sexual Activity    Alcohol use: Yes     Alcohol/week: 0.0 standard drinks     Frequency: Monthly or less     Drinks per session: 1 or 2     Binge frequency: Never     Comment: occasionally    Drug use: No    Sexual activity: Yes     Partners: Male     Birth control/protection: Surgical     Comment: BTL; mut monog   Lifestyle    Physical activity     Days per week: 0 days      "Minutes per session: 0 min    Stress: Only a little   Relationships    Social connections     Talks on phone: More than three times a week     Gets together: Once a week     Attends Latter-day service: Not on file     Active member of club or organization: No     Attends meetings of clubs or organizations: Never     Relationship status:    Other Topics Concern    Are you pregnant or think you may be? No    Breast-feeding No   Social History Narrative    Not on file       Vitals:    08/24/20 0855   BP: 123/78   Pulse: 84   Weight: 50.9 kg (112 lb 3.4 oz)   Height: 5' 2" (1.575 m)   PainSc:   6   PainLoc: Foot           Review of Systems   Constitutional: Negative for chills and fever.   Respiratory: Negative for shortness of breath.    Cardiovascular: Negative for chest pain, palpitations, orthopnea, claudication and leg swelling.   Gastrointestinal: Negative for diarrhea, nausea and vomiting.   Musculoskeletal: Negative for joint pain.   Skin: Negative for rash.   Neurological: Negative for dizziness, tingling, sensory change, focal weakness and weakness.   Psychiatric/Behavioral: Negative.              Objective:   PHYSICAL EXAM: Apperance: Alert and orient in no distress,well developed, and with good attention to grooming and body habits  Patient presents ambulating in wedges.  Lower Extremity Exam  VASCULAR: Dorsalis pedis pulses 2/4 bilateral and Posterior Tibial pulses 2/4 bilateral. Capillary fill time <4 seconds bilateral. No edema observed bilateral. Varicosities absent bilateral. Skin temperature of the lower extremities is warm to warm, proximal to distal. Hair growth WNL bilateral.  DERMATOLOGICAL: No skin rashes, subcutaneous nodules, lesions, or ulcers observed bilateral.   NEUROLOGICAL: Light touch, sharp-dull, proprioception all present and equal bilaterally.    MUSCULOSKELETAL: Muscle strength 5/5 for all foot inverters, everters, plantarflexors, and dorsiflexors bilateral. Ankle joints " bilateral shows decreased ROM. bilateral ankle ROM shows greater decrease in dorsiflexion with knee extended. Ankle joint ROM is pain free and without crepitus bilateral. Pain to palpation bilateral plantar medial tubercle. Plantar medial aspect of bilateral heels shows tenderness to palpation. No pain on medial-lateral compression of the calcaneus. Tenderness with palpation of bilateral posterior 1/3 calcaneus at region of Achilles tendon insertion.          Assessment:   The following prescriptions/orders were written today per listed diagnosis  Achilles tendinitis of both lower extremities  -     meloxicam (MOBIC) 15 MG tablet; Take 1 tablet (15 mg total) by mouth once daily.  Dispense: 30 tablet; Refill: 0    Plantar fasciitis, bilateral  -     meloxicam (MOBIC) 15 MG tablet; Take 1 tablet (15 mg total) by mouth once daily.  Dispense: 30 tablet; Refill: 0    Equinus contracture of ankle  -     meloxicam (MOBIC) 15 MG tablet; Take 1 tablet (15 mg total) by mouth once daily.  Dispense: 30 tablet; Refill: 0          Plan:   Achilles tendinitis of both lower extremities  -     meloxicam (MOBIC) 15 MG tablet; Take 1 tablet (15 mg total) by mouth once daily.  Dispense: 30 tablet; Refill: 0    Plantar fasciitis, bilateral  -     meloxicam (MOBIC) 15 MG tablet; Take 1 tablet (15 mg total) by mouth once daily.  Dispense: 30 tablet; Refill: 0    Equinus contracture of ankle  -     meloxicam (MOBIC) 15 MG tablet; Take 1 tablet (15 mg total) by mouth once daily.  Dispense: 30 tablet; Refill: 0      I counseled the patient on her conditions, regarding findings of my examination, my impressions, and usual treatment plan.   I explained to the patient that etiology and treatment options for heel pain including rest,  ice messages, stretching exercises, strappings/tappings, NSAID's, injections, new shoegear with orthotic inserts, and/or surgical treatment.   Patient agreed to stretching exercises and oral medication.   I gave  written and verbal instructions on heel cord stretching and this was demonstrated for the patient. Patient expressed understanding.  Prescribed Meloxicam 15mg to be taken once daily with food for the next 7 days and then as needed for inflammation. Patient advised on the possible elevation of blood pressure or heart effects and caution to take pills as needed and to discontinue use if symptoms arise, patient agreed.  Due to patient broken left wrist patient instructed to purchase night splint to be applied for stretching.   Patient instructed on adequate icing techniques. Patient should ice the affected area at least once per day x 10 minutes for 10 days . I advised the  patient that extra icing would also be beneficial to ensure adequate anti inflammatory effect.   The patient and I reviewed the types of shoes she should be wearing, my recommendation includes generally the best time of the day for a shoe fitting is the afternoon, shoes with a wide toe box, very good cushion, and tennis shoes with removable inner soles. The patient and I reviewed my recommendations for over-the-counter orthotic inserts.   Patient to return in 6 weeks.                   Reji Hinojosa DPM  [facility]

## 2020-09-03 ENCOUNTER — TELEPHONE (OUTPATIENT)
Dept: HEMATOLOGY/ONCOLOGY | Facility: CLINIC | Age: 50
End: 2020-09-03

## 2020-09-11 ENCOUNTER — OFFICE VISIT (OUTPATIENT)
Dept: HEMATOLOGY/ONCOLOGY | Facility: CLINIC | Age: 50
End: 2020-09-11
Payer: COMMERCIAL

## 2020-09-11 VITALS
DIASTOLIC BLOOD PRESSURE: 78 MMHG | OXYGEN SATURATION: 100 % | TEMPERATURE: 98 F | HEART RATE: 73 BPM | SYSTOLIC BLOOD PRESSURE: 125 MMHG | WEIGHT: 117.5 LBS | HEIGHT: 65 IN | BODY MASS INDEX: 19.58 KG/M2

## 2020-09-11 DIAGNOSIS — S69.92XD INJURY OF LEFT WRIST, SUBSEQUENT ENCOUNTER: ICD-10-CM

## 2020-09-11 DIAGNOSIS — Z12.11 COLON CANCER SCREENING: Primary | ICD-10-CM

## 2020-09-11 DIAGNOSIS — Z86.2 HISTORY OF IRON DEFICIENCY ANEMIA: ICD-10-CM

## 2020-09-11 PROCEDURE — 3008F PR BODY MASS INDEX (BMI) DOCUMENTED: ICD-10-PCS | Mod: CPTII,S$GLB,, | Performed by: NURSE PRACTITIONER

## 2020-09-11 PROCEDURE — 3008F BODY MASS INDEX DOCD: CPT | Mod: CPTII,S$GLB,, | Performed by: NURSE PRACTITIONER

## 2020-09-11 PROCEDURE — 99213 PR OFFICE/OUTPT VISIT, EST, LEVL III, 20-29 MIN: ICD-10-PCS | Mod: S$GLB,,, | Performed by: NURSE PRACTITIONER

## 2020-09-11 PROCEDURE — 99999 PR PBB SHADOW E&M-EST. PATIENT-LVL III: CPT | Mod: PBBFAC,,, | Performed by: NURSE PRACTITIONER

## 2020-09-11 PROCEDURE — 99999 PR PBB SHADOW E&M-EST. PATIENT-LVL III: ICD-10-PCS | Mod: PBBFAC,,, | Performed by: NURSE PRACTITIONER

## 2020-09-11 PROCEDURE — 99213 OFFICE O/P EST LOW 20 MIN: CPT | Mod: S$GLB,,, | Performed by: NURSE PRACTITIONER

## 2020-09-11 NOTE — PROGRESS NOTES
Subjective:      Patient ID: Alexandra Rivas is a 50 y.o. female.    Chief Complaint: lab discussion    HPI:  Patient is a 50-year-old female with history of iron deficiency anemia secondary to increased menstrual cycles blood loss.  She states that she has not had a menstrual cycle in over a year now and is currently in menopause.  Previously treated with IV Injectafer in 2017.  She is up-to-date on mammograms.  She is due for screening colonoscopy now.  She has her labs done at [facility] no results currently.    She denies any symptoms of anemia.  States that she feels fine.  Has a cast on her left forearm due to breaking her wrists recently he with fall.  Labs done at [facility] = hgb 12.4 g/dL, iron continues to be repleated.       Social History     Socioeconomic History    Marital status:      Spouse name: Not on file    Number of children: Not on file    Years of education: Not on file    Highest education level: Not on file   Occupational History    Not on file   Social Needs    Financial resource strain: Not hard at all    Food insecurity     Worry: Never true     Inability: Never true    Transportation needs     Medical: No     Non-medical: No   Tobacco Use    Smoking status: Never Smoker    Smokeless tobacco: Never Used   Substance and Sexual Activity    Alcohol use: Yes     Alcohol/week: 0.0 standard drinks     Frequency: Monthly or less     Drinks per session: 1 or 2     Binge frequency: Never     Comment: occasionally    Drug use: No    Sexual activity: Yes     Partners: Male     Birth control/protection: Surgical     Comment: BTL; mut monog   Lifestyle    Physical activity     Days per week: 0 days     Minutes per session: 0 min    Stress: Only a little   Relationships    Social connections     Talks on phone: More than three times a week     Gets together: Once a week     Attends [employer] service: Not on file     Active member of club or organization: No     Attends  "meetings of clubs or organizations: Never     Relationship status:    Other Topics Concern    Are you pregnant or think you may be? No    Breast-feeding No   Social History Narrative    Not on file       Family History   Problem Relation Age of Onset    Hypertension Mother     Psoriasis Mother     Aneurysm Father     Thyroid cancer Unknown         papillary, mother and sister 43y.    Hypertension Sister     Psoriasis Sister     Diabetes Paternal Grandmother     Psoriasis Maternal Grandmother     Breast cancer Neg Hx     Colon cancer Neg Hx     Ovarian cancer Neg Hx     Melanoma Neg Hx     Lupus Neg Hx     Eczema Neg Hx     Thrombophilia Neg Hx        Past Surgical History:   Procedure Laterality Date    TONSILLECTOMY      TUBAL LIGATION         Past Medical History:   Diagnosis Date    Allergic rhinitis     FH: thyroid cancer     Gastritis and duodenitis 4/19/2016    Headache, classical migraine     occasional aura "every now and then"    Vitamin D deficiency disease        Review of Systems   Constitutional: Negative.    HENT: Negative.    Eyes: Negative.    Respiratory: Negative.    Cardiovascular: Negative.    Gastrointestinal: Negative.    Endocrine: Negative.    Genitourinary: Negative.    Musculoskeletal: Positive for arthralgias.        Hard cast the left forearm   Skin: Negative.    Allergic/Immunologic: Negative.    Neurological: Negative.    Hematological: Negative.    Psychiatric/Behavioral: Negative.           Medication List with Changes/Refills   Current Medications    AMITRIPTYLINE (ELAVIL) 10 MG TABLET    Take 1 tablet (10 mg total) by mouth nightly as needed for Insomnia.    CLOBETASOL (TEMOVATE) 0.05 % EXTERNAL SOLUTION    Apply to affected areas of scalp 1-2 times daily as needed for itch    ESTRADIOL (VAGIFEM) 10 MCG TAB    Place 1 tablet (10 mcg total) vaginally once daily. One tablet intravaginal x 2 weeks then 2-3 times per week.    FLUTICASONE PROPIONATE " (FLONASE) 50 MCG/ACTUATION NASAL SPRAY    2 sprays (100 mcg total) by Each Nostril route once daily.    KETOCONAZOLE (NIZORAL) 2 % SHAMPOO    Wash hair with medicated shampoo at least 1x/week - let sit on scalp at least 5 minutes prior to rinsing    MELOXICAM (MOBIC) 15 MG TABLET    Take 1 tablet (15 mg total) by mouth once daily.    OMEPRAZOLE (PRILOSEC) 40 MG CAPSULE    Take 1 capsule (40 mg total) by mouth once daily.    TRAMADOL (ULTRAM) 50 MG TABLET    Take 1 tablet (50 mg total) by mouth every 6 (six) hours.    VITAMIN D2 1,250 MCG (50,000 UNIT) CAPSULE    Take 1 capsule by mouth once a week    VITAMIN E 400 UNIT CAPSULE    Take 1 capsule (400 Units total) by mouth once daily.        Objective:     Vitals:    09/11/20 1021   BP: 125/78   Pulse: 73   Temp: 97.7 °F (36.5 °C)       Physical Exam  Vitals signs reviewed.   Constitutional:       Appearance: Normal appearance.   HENT:      Head: Normocephalic and atraumatic.   Eyes:      Extraocular Movements: Extraocular movements intact.   Neck:      Musculoskeletal: Normal range of motion.   Cardiovascular:      Rate and Rhythm: Normal rate and regular rhythm.      Pulses: Normal pulses.      Heart sounds: Normal heart sounds, S1 normal and S2 normal.   Pulmonary:      Effort: Pulmonary effort is normal.      Breath sounds: Normal breath sounds.   Abdominal:      General: There is no distension.   Musculoskeletal:         General: Signs of injury (left forearm wrist break in hard cast) present.   Skin:     General: Skin is warm and dry.   Neurological:      General: No focal deficit present.      Mental Status: She is alert and oriented to person, place, and time.   Psychiatric:         Attention and Perception: Attention and perception normal.         Mood and Affect: Mood normal.         Speech: Speech normal.         Behavior: Behavior normal.         Thought Content: Thought content normal.         Cognition and Memory: Cognition and memory normal.          Judgment: Judgment normal.         Assessment:     Problem List Items Addressed This Visit     None      Visit Diagnoses     Colon cancer screening    -  Primary    Relevant Orders    Case request GI: COLONOSCOPY (Completed)    History of iron deficiency anemia        Injury of left wrist, subsequent encounter              Plan:   Colon cancer screening  -     Case request GI: COLONOSCOPY    History of iron deficiency anemia    Injury of left wrist, subsequent encounter    Received labs from [facility] showing she is not anemic and iron studies continue to be repleated.   She will no longer have to follow up with us in the clinic.  Will order colonoscopy for colon cancer screening purposes.      Collaborating Provider:  Dr. Bj Laird    Thank You,  Rita Zafar, FNP-C

## 2020-09-18 ENCOUNTER — TELEPHONE (OUTPATIENT)
Dept: GASTROENTEROLOGY | Facility: CLINIC | Age: 50
End: 2020-09-18

## 2020-09-21 ENCOUNTER — TELEPHONE (OUTPATIENT)
Dept: GASTROENTEROLOGY | Facility: CLINIC | Age: 50
End: 2020-09-21

## 2020-10-05 ENCOUNTER — OFFICE VISIT (OUTPATIENT)
Dept: PODIATRY | Facility: CLINIC | Age: 50
End: 2020-10-05
Payer: COMMERCIAL

## 2020-10-05 ENCOUNTER — PATIENT OUTREACH (OUTPATIENT)
Dept: ADMINISTRATIVE | Facility: OTHER | Age: 50
End: 2020-10-05

## 2020-10-05 VITALS
HEART RATE: 84 BPM | HEIGHT: 65 IN | WEIGHT: 117 LBS | BODY MASS INDEX: 19.49 KG/M2 | DIASTOLIC BLOOD PRESSURE: 84 MMHG | SYSTOLIC BLOOD PRESSURE: 137 MMHG

## 2020-10-05 DIAGNOSIS — M76.62 ACHILLES TENDINITIS OF BOTH LOWER EXTREMITIES: Primary | ICD-10-CM

## 2020-10-05 DIAGNOSIS — M76.61 ACHILLES TENDINITIS OF BOTH LOWER EXTREMITIES: Primary | ICD-10-CM

## 2020-10-05 DIAGNOSIS — M24.573 EQUINUS CONTRACTURE OF ANKLE: ICD-10-CM

## 2020-10-05 DIAGNOSIS — M72.2 PLANTAR FASCIITIS, BILATERAL: ICD-10-CM

## 2020-10-05 PROCEDURE — 99999 PR PBB SHADOW E&M-EST. PATIENT-LVL IV: CPT | Mod: PBBFAC,,, | Performed by: PODIATRIST

## 2020-10-05 PROCEDURE — 99999 PR PBB SHADOW E&M-EST. PATIENT-LVL IV: ICD-10-PCS | Mod: PBBFAC,,, | Performed by: PODIATRIST

## 2020-10-05 PROCEDURE — 3008F PR BODY MASS INDEX (BMI) DOCUMENTED: ICD-10-PCS | Mod: CPTII,S$GLB,, | Performed by: PODIATRIST

## 2020-10-05 PROCEDURE — 99213 PR OFFICE/OUTPT VISIT, EST, LEVL III, 20-29 MIN: ICD-10-PCS | Mod: S$GLB,,, | Performed by: PODIATRIST

## 2020-10-05 PROCEDURE — 99213 OFFICE O/P EST LOW 20 MIN: CPT | Mod: S$GLB,,, | Performed by: PODIATRIST

## 2020-10-05 PROCEDURE — 3008F BODY MASS INDEX DOCD: CPT | Mod: CPTII,S$GLB,, | Performed by: PODIATRIST

## 2020-10-05 NOTE — PROGRESS NOTES
Subjective:     Patient ID: Alexandra Rivas is a 50 y.o. female.    Chief Complaint: Tendonitis (c/o bilateral achilles pain. rates pain 6/10. wears tennis shoes with socks. non-diabetic Pt. last seen on 07/15/20 with PCP Dr. Ojeda.)    Alexandra is a 50 y.o. female who presents to the clinic for follow up of heel pain in both feet, especially with the first step in the morning. The pain is described as tight. The onset of the pain was gradual improved over the past several weeks. Alexandra rates the pain as 6/10. She reports a history of trauma. Prior treatments include steroid pack and home stretching exercises, not very effective. Patient has no other pedal complaints.     Patient Active Problem List   Diagnosis    Stress incontinence    Migraine with aura and without status migrainosus, not intractable    FH: thyroid cancer    Vitamin D deficiency disease    Myofascial pain    Gastritis and duodenitis    Iron deficiency anemia    Chronic seasonal allergic rhinitis due to pollen    Iron deficiency anemia due to chronic blood loss    Menopausal disorder    Other hyperlipidemia       Medication List with Changes/Refills   Current Medications    AMITRIPTYLINE (ELAVIL) 10 MG TABLET    Take 1 tablet (10 mg total) by mouth nightly as needed for Insomnia.    CLOBETASOL (TEMOVATE) 0.05 % EXTERNAL SOLUTION    Apply to affected areas of scalp 1-2 times daily as needed for itch    ESTRADIOL (VAGIFEM) 10 MCG TAB    Place 1 tablet (10 mcg total) vaginally once daily. One tablet intravaginal x 2 weeks then 2-3 times per week.    FLUTICASONE PROPIONATE (FLONASE) 50 MCG/ACTUATION NASAL SPRAY    2 sprays (100 mcg total) by Each Nostril route once daily.    KETOCONAZOLE (NIZORAL) 2 % SHAMPOO    Wash hair with medicated shampoo at least 1x/week - let sit on scalp at least 5 minutes prior to rinsing    MELOXICAM (MOBIC) 15 MG TABLET    Take 1 tablet (15 mg total) by mouth once daily.    OMEPRAZOLE (PRILOSEC) 40 MG  CAPSULE    Take 1 capsule (40 mg total) by mouth once daily.    TRAMADOL (ULTRAM) 50 MG TABLET    Take 1 tablet (50 mg total) by mouth every 6 (six) hours.    VITAMIN D2 1,250 MCG (50,000 UNIT) CAPSULE    Take 1 capsule by mouth once a week    VITAMIN E 400 UNIT CAPSULE    Take 1 capsule (400 Units total) by mouth once daily.       Review of patient's allergies indicates:   Allergen Reactions    Oxycodone-acetaminophen Itching       Past Surgical History:   Procedure Laterality Date    TONSILLECTOMY      TUBAL LIGATION         Family History   Problem Relation Age of Onset    Hypertension Mother     Psoriasis Mother     Aneurysm Father     Thyroid cancer Unknown         papillary, mother and sister 43y.    Hypertension Sister     Psoriasis Sister     Diabetes Paternal Grandmother     Psoriasis Maternal Grandmother     Breast cancer Neg Hx     Colon cancer Neg Hx     Ovarian cancer Neg Hx     Melanoma Neg Hx     Lupus Neg Hx     Eczema Neg Hx     Thrombophilia Neg Hx        Social History     Socioeconomic History    Marital status:      Spouse name: Not on file    Number of children: Not on file    Years of education: Not on file    Highest education level: Not on file   Occupational History    Not on file   Social Needs    Financial resource strain: Not hard at all    Food insecurity     Worry: Never true     Inability: Never true    Transportation needs     Medical: No     Non-medical: No   Tobacco Use    Smoking status: Never Smoker    Smokeless tobacco: Never Used   Substance and Sexual Activity    Alcohol use: Yes     Alcohol/week: 0.0 standard drinks     Frequency: Monthly or less     Drinks per session: 1 or 2     Binge frequency: Never     Comment: occasionally    Drug use: No    Sexual activity: Yes     Partners: Male     Birth control/protection: Surgical     Comment: BTL; mut monog   Lifestyle    Physical activity     Days per week: 0 days     Minutes per session: 0  "min    Stress: Only a little   Relationships    Social connections     Talks on phone: More than three times a week     Gets together: Once a week     Attends [employer] service: Not on file     Active member of club or organization: No     Attends meetings of clubs or organizations: Never     Relationship status:    Other Topics Concern    Are you pregnant or think you may be? No    Breast-feeding No   Social History Narrative    Not on file       Vitals:    10/05/20 0941   BP: 137/84   Pulse: 84   Weight: 53.1 kg (117 lb)   Height: 5' 5" (1.651 m)   PainSc:   6   PainLoc: Foot       Review of Systems   Constitutional: Negative for chills and fever.   Respiratory: Negative for shortness of breath.    Cardiovascular: Negative for chest pain, palpitations, orthopnea, claudication and leg swelling.   Gastrointestinal: Negative for diarrhea, nausea and vomiting.   Musculoskeletal: Negative for joint pain.   Skin: Negative for rash.   Neurological: Negative for dizziness, tingling, sensory change, focal weakness and weakness.   Psychiatric/Behavioral: Negative.              Objective:   PHYSICAL EXAM: Apperance: Alert and orient in no distress,well developed, and with good attention to grooming and body habits  Patient presents ambulating in wedges.  Lower Extremity Exam  VASCULAR: Dorsalis pedis pulses 2/4 bilateral and Posterior Tibial pulses 2/4 bilateral. Capillary fill time <4 seconds bilateral. No edema observed bilateral. Varicosities absent bilateral. Skin temperature of the lower extremities is warm to warm, proximal to distal. Hair growth WNL bilateral.  DERMATOLOGICAL: No skin rashes, subcutaneous nodules, lesions, or ulcers observed bilateral.   NEUROLOGICAL: Light touch, sharp-dull, proprioception all present and equal bilaterally.    MUSCULOSKELETAL: Muscle strength 5/5 for all foot inverters, everters, plantarflexors, and dorsiflexors bilateral. Ankle joints bilateral shows decreased ROM. " bilateral ankle ROM shows greater decrease in dorsiflexion with knee extended. Ankle joint ROM is pain free and without crepitus bilateral. Pain to palpation bilateral plantar medial tubercle. Plantar medial aspect of bilateral heels shows tenderness to palpation. No pain on medial-lateral compression of the calcaneus. Tenderness with palpation of bilateral posterior 1/3 calcaneus at region of Achilles tendon insertion.          Assessment:   The following prescriptions/orders were written today per listed diagnosis  Achilles tendinitis of both lower extremities  -     Ambulatory referral/consult to Physical/Occupational Therapy; Future; Expected date: 11/01/2020    Plantar fasciitis, bilateral  -     Ambulatory referral/consult to Physical/Occupational Therapy; Future; Expected date: 11/01/2020    Equinus contracture of ankle  -     Ambulatory referral/consult to Physical/Occupational Therapy; Future; Expected date: 11/01/2020          Plan:   Achilles tendinitis of both lower extremities  -     Ambulatory referral/consult to Physical/Occupational Therapy; Future; Expected date: 11/01/2020    Plantar fasciitis, bilateral  -     Ambulatory referral/consult to Physical/Occupational Therapy; Future; Expected date: 11/01/2020    Equinus contracture of ankle  -     Ambulatory referral/consult to Physical/Occupational Therapy; Future; Expected date: 11/01/2020      I counseled the patient on her conditions, regarding findings of my examination, my impressions, and usual treatment plan.   I explained to the patient that etiology and treatment options for heel pain including rest,  ice messages, stretching exercises, strappings/tappings, NSAID's, injections, new shoegear with orthotic inserts, and/or surgical treatment.   Patient agreed to stretching exercises and oral medication.   I gave written and verbal instructions on heel cord stretching and this was demonstrated for the patient. Patient expressed  understanding.  Patient instructed on adequate icing techniques. Patient should ice the affected area at least once per day x 10 minutes for 10 days . I advised the  patient that extra icing would also be beneficial to ensure adequate anti inflammatory effect.   The patient and I reviewed the types of shoes she should be wearing, my recommendation includes generally the best time of the day for a shoe fitting is the afternoon, shoes with a wide toe box, very good cushion, and tennis shoes with removable inner soles. The patient and I reviewed my recommendations for over-the-counter orthotic inserts.   Referral completed for physical therapy.   Patient to return in 6 weeks.                   Reji Hinojosa DPM  [facility]

## 2020-11-10 ENCOUNTER — HOSPITAL ENCOUNTER (OUTPATIENT)
Dept: RADIOLOGY | Facility: HOSPITAL | Age: 50
Discharge: HOME | End: 2020-11-10
Attending: INTERNAL MEDICINE
Payer: COMMERCIAL

## 2020-11-10 DIAGNOSIS — Z80.8 FH: THYROID CANCER: ICD-10-CM

## 2020-11-10 PROCEDURE — 76536 US SOFT TISSUE HEAD NECK THYROID: ICD-10-PCS | Mod: 26,,, | Performed by: RADIOLOGY

## 2020-11-10 PROCEDURE — 76536 US EXAM OF HEAD AND NECK: CPT | Mod: 26,,, | Performed by: RADIOLOGY

## 2020-11-10 PROCEDURE — 76536 US EXAM OF HEAD AND NECK: CPT | Mod: TC

## 2020-11-17 RX ORDER — IBUPROFEN 800 MG/1
800 TABLET ORAL EVERY 6 HOURS PRN
COMMUNITY
Start: 2020-11-09 | End: 2022-01-31

## 2020-11-18 ENCOUNTER — TELEPHONE (OUTPATIENT)
Dept: FAMILY MEDICINE | Facility: CLINIC | Age: 50
End: 2020-11-18

## 2020-11-18 NOTE — TELEPHONE ENCOUNTER
----- Message from Juanitadaiana Smith sent at 11/18/2020  2:34 PM CST -----  Contact: pt  Type:  Sooner Apoointment Request    Caller is requesting a sooner appointment.  Caller declined first available appointment listed below.  Caller will not accept being placed on the waitlist and is requesting a message be sent to doctor.  Name of Caller:Opal  When is the first available appointment?02/2021  Symptoms:RTC 6mos  Would the patient rather a call back or a response via [facility]? call  Best Call Back Number: [phone]  Additional Information: pt missed her 11/17 appt, she got her days mixed up.

## 2020-12-06 ENCOUNTER — PATIENT OUTREACH (OUTPATIENT)
Dept: ADMINISTRATIVE | Facility: OTHER | Age: 50
End: 2020-12-06

## 2021-07-21 DIAGNOSIS — Z12.31 OTHER SCREENING MAMMOGRAM: ICD-10-CM

## 2021-10-14 ENCOUNTER — PATIENT MESSAGE (OUTPATIENT)
Dept: ADMINISTRATIVE | Facility: HOSPITAL | Age: 51
End: 2021-10-14
Payer: COMMERCIAL

## 2022-01-31 ENCOUNTER — LAB VISIT (OUTPATIENT)
Dept: LAB | Facility: HOSPITAL | Age: 52
End: 2022-01-31
Attending: INTERNAL MEDICINE
Payer: COMMERCIAL

## 2022-01-31 ENCOUNTER — OFFICE VISIT (OUTPATIENT)
Dept: FAMILY MEDICINE | Facility: CLINIC | Age: 52
End: 2022-01-31
Payer: COMMERCIAL

## 2022-01-31 VITALS
OXYGEN SATURATION: 96 % | WEIGHT: 114.63 LBS | RESPIRATION RATE: 18 BRPM | TEMPERATURE: 98 F | HEIGHT: 65 IN | BODY MASS INDEX: 19.1 KG/M2 | DIASTOLIC BLOOD PRESSURE: 76 MMHG | HEART RATE: 73 BPM | SYSTOLIC BLOOD PRESSURE: 116 MMHG

## 2022-01-31 DIAGNOSIS — Z01.810 PREOP CARDIOVASCULAR EXAM: ICD-10-CM

## 2022-01-31 DIAGNOSIS — D50.0 IRON DEFICIENCY ANEMIA DUE TO CHRONIC BLOOD LOSS: ICD-10-CM

## 2022-01-31 DIAGNOSIS — L21.9 SEBORRHEIC DERMATITIS: ICD-10-CM

## 2022-01-31 DIAGNOSIS — Z00.00 ENCOUNTER FOR PREVENTIVE HEALTH EXAMINATION: Primary | ICD-10-CM

## 2022-01-31 DIAGNOSIS — Z80.8 FH: THYROID CANCER: ICD-10-CM

## 2022-01-31 DIAGNOSIS — E78.49 OTHER HYPERLIPIDEMIA: ICD-10-CM

## 2022-01-31 DIAGNOSIS — L29.9 SCALP PRURITUS: ICD-10-CM

## 2022-01-31 DIAGNOSIS — G43.109 MIGRAINE WITH AURA AND WITHOUT STATUS MIGRAINOSUS, NOT INTRACTABLE: ICD-10-CM

## 2022-01-31 DIAGNOSIS — Z12.11 SCREEN FOR COLON CANCER: ICD-10-CM

## 2022-01-31 DIAGNOSIS — N39.3 STRESS INCONTINENCE: ICD-10-CM

## 2022-01-31 DIAGNOSIS — J30.1 CHRONIC SEASONAL ALLERGIC RHINITIS DUE TO POLLEN: ICD-10-CM

## 2022-01-31 DIAGNOSIS — K21.9 GASTROESOPHAGEAL REFLUX DISEASE WITHOUT ESOPHAGITIS: ICD-10-CM

## 2022-01-31 DIAGNOSIS — Z00.00 ENCOUNTER FOR PREVENTIVE HEALTH EXAMINATION: ICD-10-CM

## 2022-01-31 LAB
ALBUMIN SERPL BCP-MCNC: 4.4 G/DL (ref 3.5–5.2)
ALP SERPL-CCNC: 65 U/L (ref 55–135)
ALT SERPL W/O P-5'-P-CCNC: <5 U/L (ref 10–44)
ANION GAP SERPL CALC-SCNC: 9 MMOL/L (ref 8–16)
APTT BLDCRRT: 27.5 SEC (ref 21–32)
AST SERPL-CCNC: 17 U/L (ref 10–40)
BASOPHILS # BLD AUTO: 0.03 K/UL (ref 0–0.2)
BASOPHILS NFR BLD: 0.6 % (ref 0–1.9)
BILIRUB SERPL-MCNC: 0.9 MG/DL (ref 0.1–1)
BUN SERPL-MCNC: 13 MG/DL (ref 6–20)
CALCIUM SERPL-MCNC: 9.9 MG/DL (ref 8.7–10.5)
CHLORIDE SERPL-SCNC: 106 MMOL/L (ref 95–110)
CO2 SERPL-SCNC: 26 MMOL/L (ref 23–29)
CREAT SERPL-MCNC: 0.7 MG/DL (ref 0.5–1.4)
DIFFERENTIAL METHOD: ABNORMAL
EOSINOPHIL # BLD AUTO: 0.1 K/UL (ref 0–0.5)
EOSINOPHIL NFR BLD: 1.6 % (ref 0–8)
ERYTHROCYTE [DISTWIDTH] IN BLOOD BY AUTOMATED COUNT: 13 % (ref 11.5–14.5)
EST. GFR  (AFRICAN AMERICAN): >60 ML/MIN/1.73 M^2
EST. GFR  (NON AFRICAN AMERICAN): >60 ML/MIN/1.73 M^2
ESTIMATED AVG GLUCOSE: 108 MG/DL (ref 68–131)
GLUCOSE SERPL-MCNC: 89 MG/DL (ref 70–110)
HBA1C MFR BLD: 5.4 % (ref 4–5.6)
HCT VFR BLD AUTO: 41.6 % (ref 37–48.5)
HGB BLD-MCNC: 12.5 G/DL (ref 12–16)
IMM GRANULOCYTES # BLD AUTO: 0.01 K/UL (ref 0–0.04)
IMM GRANULOCYTES NFR BLD AUTO: 0.2 % (ref 0–0.5)
INR PPP: 1 (ref 0.8–1.2)
LYMPHOCYTES # BLD AUTO: 1.8 K/UL (ref 1–4.8)
LYMPHOCYTES NFR BLD: 36.9 % (ref 18–48)
MCH RBC QN AUTO: 26.9 PG (ref 27–31)
MCHC RBC AUTO-ENTMCNC: 30 G/DL (ref 32–36)
MCV RBC AUTO: 90 FL (ref 82–98)
MONOCYTES # BLD AUTO: 0.4 K/UL (ref 0.3–1)
MONOCYTES NFR BLD: 7.7 % (ref 4–15)
NEUTROPHILS # BLD AUTO: 2.6 K/UL (ref 1.8–7.7)
NEUTROPHILS NFR BLD: 53 % (ref 38–73)
NRBC BLD-RTO: 0 /100 WBC
PLATELET # BLD AUTO: 279 K/UL (ref 150–450)
PMV BLD AUTO: 10.7 FL (ref 9.2–12.9)
POTASSIUM SERPL-SCNC: 4.3 MMOL/L (ref 3.5–5.1)
PROT SERPL-MCNC: 7.3 G/DL (ref 6–8.4)
PROTHROMBIN TIME: 10.9 SEC (ref 9–12.5)
RBC # BLD AUTO: 4.65 M/UL (ref 4–5.4)
SODIUM SERPL-SCNC: 141 MMOL/L (ref 136–145)
TSH SERPL DL<=0.005 MIU/L-ACNC: 0.74 UIU/ML (ref 0.4–4)
WBC # BLD AUTO: 4.93 K/UL (ref 3.9–12.7)

## 2022-01-31 PROCEDURE — 1159F PR MEDICATION LIST DOCUMENTED IN MEDICAL RECORD: ICD-10-PCS | Mod: CPTII,S$GLB,, | Performed by: INTERNAL MEDICINE

## 2022-01-31 PROCEDURE — 90471 FLU VACCINE (QUAD) GREATER THAN OR EQUAL TO 3YO PRESERVATIVE FREE IM: ICD-10-PCS | Mod: S$GLB,,, | Performed by: INTERNAL MEDICINE

## 2022-01-31 PROCEDURE — 36415 COLL VENOUS BLD VENIPUNCTURE: CPT | Mod: PO | Performed by: INTERNAL MEDICINE

## 2022-01-31 PROCEDURE — 1159F MED LIST DOCD IN RCRD: CPT | Mod: CPTII,S$GLB,, | Performed by: INTERNAL MEDICINE

## 2022-01-31 PROCEDURE — 85610 PROTHROMBIN TIME: CPT | Performed by: INTERNAL MEDICINE

## 2022-01-31 PROCEDURE — 99999 PR PBB SHADOW E&M-EST. PATIENT-LVL IV: CPT | Mod: PBBFAC,,, | Performed by: INTERNAL MEDICINE

## 2022-01-31 PROCEDURE — 3074F PR MOST RECENT SYSTOLIC BLOOD PRESSURE < 130 MM HG: ICD-10-PCS | Mod: CPTII,S$GLB,, | Performed by: INTERNAL MEDICINE

## 2022-01-31 PROCEDURE — 84443 ASSAY THYROID STIM HORMONE: CPT | Performed by: INTERNAL MEDICINE

## 2022-01-31 PROCEDURE — 3008F BODY MASS INDEX DOCD: CPT | Mod: CPTII,S$GLB,, | Performed by: INTERNAL MEDICINE

## 2022-01-31 PROCEDURE — 90686 FLU VACCINE (QUAD) GREATER THAN OR EQUAL TO 3YO PRESERVATIVE FREE IM: ICD-10-PCS | Mod: S$GLB,,, | Performed by: INTERNAL MEDICINE

## 2022-01-31 PROCEDURE — 3078F PR MOST RECENT DIASTOLIC BLOOD PRESSURE < 80 MM HG: ICD-10-PCS | Mod: CPTII,S$GLB,, | Performed by: INTERNAL MEDICINE

## 2022-01-31 PROCEDURE — 90686 IIV4 VACC NO PRSV 0.5 ML IM: CPT | Mod: S$GLB,,, | Performed by: INTERNAL MEDICINE

## 2022-01-31 PROCEDURE — 93010 EKG 12-LEAD: ICD-10-PCS | Mod: S$GLB,,, | Performed by: INTERNAL MEDICINE

## 2022-01-31 PROCEDURE — 93010 ELECTROCARDIOGRAM REPORT: CPT | Mod: S$GLB,,, | Performed by: INTERNAL MEDICINE

## 2022-01-31 PROCEDURE — 85730 THROMBOPLASTIN TIME PARTIAL: CPT | Performed by: INTERNAL MEDICINE

## 2022-01-31 PROCEDURE — 99999 PR PBB SHADOW E&M-EST. PATIENT-LVL IV: ICD-10-PCS | Mod: PBBFAC,,, | Performed by: INTERNAL MEDICINE

## 2022-01-31 PROCEDURE — 3078F DIAST BP <80 MM HG: CPT | Mod: CPTII,S$GLB,, | Performed by: INTERNAL MEDICINE

## 2022-01-31 PROCEDURE — 90471 IMMUNIZATION ADMIN: CPT | Mod: S$GLB,,, | Performed by: INTERNAL MEDICINE

## 2022-01-31 PROCEDURE — 85025 COMPLETE CBC W/AUTO DIFF WBC: CPT | Performed by: INTERNAL MEDICINE

## 2022-01-31 PROCEDURE — 93005 EKG 12-LEAD: ICD-10-PCS | Mod: S$GLB,,, | Performed by: INTERNAL MEDICINE

## 2022-01-31 PROCEDURE — 80053 COMPREHEN METABOLIC PANEL: CPT | Performed by: INTERNAL MEDICINE

## 2022-01-31 PROCEDURE — 3074F SYST BP LT 130 MM HG: CPT | Mod: CPTII,S$GLB,, | Performed by: INTERNAL MEDICINE

## 2022-01-31 PROCEDURE — 99396 PREV VISIT EST AGE 40-64: CPT | Mod: 25,S$GLB,, | Performed by: INTERNAL MEDICINE

## 2022-01-31 PROCEDURE — 83036 HEMOGLOBIN GLYCOSYLATED A1C: CPT | Performed by: INTERNAL MEDICINE

## 2022-01-31 PROCEDURE — 99396 PR PREVENTIVE VISIT,EST,40-64: ICD-10-PCS | Mod: 25,S$GLB,, | Performed by: INTERNAL MEDICINE

## 2022-01-31 PROCEDURE — 3008F PR BODY MASS INDEX (BMI) DOCUMENTED: ICD-10-PCS | Mod: CPTII,S$GLB,, | Performed by: INTERNAL MEDICINE

## 2022-01-31 PROCEDURE — 93005 ELECTROCARDIOGRAM TRACING: CPT | Mod: S$GLB,,, | Performed by: INTERNAL MEDICINE

## 2022-01-31 RX ORDER — CLOBETASOL PROPIONATE 0.46 MG/ML
SOLUTION TOPICAL
Qty: 50 ML | Refills: 3 | Status: SHIPPED | OUTPATIENT
Start: 2022-01-31 | End: 2023-03-10 | Stop reason: SDUPTHER

## 2022-01-31 RX ORDER — KETOCONAZOLE 20 MG/ML
SHAMPOO, SUSPENSION TOPICAL
Qty: 120 ML | Refills: 5 | Status: SHIPPED | OUTPATIENT
Start: 2022-01-31 | End: 2023-03-10 | Stop reason: SDUPTHER

## 2022-01-31 RX ORDER — ERGOCALCIFEROL 1.25 MG/1
50000 CAPSULE ORAL
Qty: 12 CAPSULE | Refills: 3 | Status: SHIPPED | OUTPATIENT
Start: 2022-01-31 | End: 2023-03-10 | Stop reason: SDUPTHER

## 2022-01-31 RX ORDER — OMEPRAZOLE 40 MG/1
40 CAPSULE, DELAYED RELEASE ORAL DAILY
Qty: 90 CAPSULE | Refills: 3 | Status: SHIPPED | OUTPATIENT
Start: 2022-01-31 | End: 2023-03-10 | Stop reason: SDUPTHER

## 2022-01-31 NOTE — PROGRESS NOTES
"Subjective:      Patient ID: Alexandra Rivas is a 51 y.o. female.    Chief Complaint: No chief complaint on file.      HPI  Here for f/u medical problems and preventive exam.  Having some muscle stiffness lately.  No much regular exercise.  No f/c/sw/cough.  No cp/sob/palp.  BMs normal.  Urine normal.    Tested positive for covid 3-4 weeks ago, mild symptoms.    Also here for preoperative clearance for bladder lift with Dr. Shelby, 22.  No history of problems with anesthesia.  No bleeding diathesis.  No exertional cp/sob/palp.  No significant renal or hepatic disease.    Past Medical History:   Diagnosis Date    Allergic rhinitis     FH: thyroid cancer     Gastritis and duodenitis 2016    Headache, classical migraine     occasional aura "every now and then"    Vitamin D deficiency disease      Past Surgical History:   Procedure Laterality Date    TONSILLECTOMY      TUBAL LIGATION       MEDICATIONS:     omeprazole (PRILOSEC) 40 MG capsule 40 mg, Daily      meloxicam (MOBIC) 15 MG tablet 15 mg, Daily      ketoconazole (NIZORAL) 2 % shampoo     estradioL (VAGIFEM) 10 mcg Tab () 10 mcg, Daily prn.     ergocalciferol (VITAMIN D2) 50,000 unit Cap 50,000 Units, Every 7 days      clobetasoL (TEMOVATE) 0.05 % external solution      amitriptyline (ELAVIL) 10 MG tablet 10 mg, Nightly PRN      ALLERGIES:  Review of patient's allergies indicates:   Allergen Reactions    Oxycodone-acetaminophen Itching         Health Maintenance:  today fluvax, 3/21 covid vaccines,  HAV,  Td, 8/10 TDaP, 10/21 MMG/ [facility],  thyroid u/s neg to be repeated yearly.     Review of Systems   Constitutional: Negative for appetite change, chills, diaphoresis and fever.   HENT: Negative for congestion, ear pain, rhinorrhea, sinus pressure and sore throat.    Respiratory: Negative for cough, chest tightness and shortness of breath.    Cardiovascular: Negative for chest pain and palpitations. " "  Gastrointestinal: Negative for blood in stool, constipation, diarrhea, nausea and vomiting.   Genitourinary: Negative for dysuria, frequency, hematuria, menstrual problem, urgency and vaginal discharge.   Musculoskeletal: Negative for arthralgias.   Skin: Negative for rash.   Neurological: Negative for dizziness and headaches.   Psychiatric/Behavioral: Negative for sleep disturbance. The patient is not nervous/anxious.          Objective:   /76 (BP Location: Left arm, Patient Position: Sitting)   Pulse 73   Temp 98 °F (36.7 °C) (Temporal)   Resp 18   Ht 5' 5" (1.651 m)   Wt 52 kg (114 lb 10.2 oz)   LMP 12/23/2017 (Approximate)   SpO2 96%   BMI 19.08 kg/m²     Physical Exam  Constitutional:       Appearance: She is well-developed and well-nourished.   HENT:      Right Ear: External ear normal. Tympanic membrane is not injected.      Left Ear: External ear normal. Tympanic membrane is not injected.      Mouth/Throat:      Mouth: Oropharynx is clear and moist.   Eyes:      Conjunctiva/sclera: Conjunctivae normal.   Neck:      Thyroid: No thyromegaly.   Cardiovascular:      Rate and Rhythm: Normal rate and regular rhythm.      Pulses: Intact distal pulses.      Heart sounds: No murmur heard.  No friction rub. No gallop.    Pulmonary:      Effort: Pulmonary effort is normal.      Breath sounds: Normal breath sounds. No wheezing or rales.   Abdominal:      General: Bowel sounds are normal.      Palpations: Abdomen is soft. There is no mass.      Tenderness: There is no abdominal tenderness.   Musculoskeletal:         General: No edema.      Cervical back: Normal range of motion and neck supple.   Lymphadenopathy:      Cervical: No cervical adenopathy.   Skin:     General: Skin is warm.      Findings: No rash.   Neurological:      Mental Status: She is alert and oriented to person, place, and time.   Psychiatric:         Mood and Affect: Mood and affect normal.       Results for orders placed or performed " in visit on 05/12/20   CBC auto differential   Result Value Ref Range    WBC 4.1 3.4 - 10.8 x10E3/uL    RBC 4.75 3.77 - 5.28 x10E6/uL    Hemoglobin 12.9 11.1 - 15.9 g/dL    Hematocrit 39.9 34.0 - 46.6 %    MCV 84 79 - 97 fL    MCH 27.2 26.6 - 33.0 pg    MCHC 32.3 31.5 - 35.7 g/dL    RDW 14.1 11.7 - 15.4 %    Platelets 283 150 - 450 x10E3/uL    Neutrophils 46 Not Estab. %    Lymph % 44 Not Estab. %    Mono % 7 Not Estab. %    Eosinophil % 2 Not Estab. %    Basophil % 1 Not Estab. %    Neutrophils, Abs 1.9 1.4 - 7.0 x10E3/uL    Lymph # 1.8 0.7 - 3.1 x10E3/uL    Mono # 0.3 0.1 - 0.9 x10E3/uL    Eos # 0.1 0.0 - 0.4 x10E3/uL    Baso # 0.0 0.0 - 0.2 x10E3/uL    Immature Granulocytes 0 Not Estab. %    Immature Grans (Abs) 0.0 0.0 - 0.1 x10E3/uL   Comprehensive metabolic panel   Result Value Ref Range    Glucose 84 65 - 99 mg/dL    BUN 17 6 - 24 mg/dL    Creatinine 0.87 0.57 - 1.00 mg/dL    eGFR if non African American 78 >59 mL/min/1.73    eGFR if African American 90 >59 mL/min/1.73    BUN/Creatinine Ratio 20 9 - 23    Sodium 139 134 - 144 mmol/L    Potassium 4.0 3.5 - 5.2 mmol/L    Chloride 102 96 - 106 mmol/L    CO2 26 20 - 29 mmol/L    Calcium 10.1 8.7 - 10.2 mg/dL    Total Protein 6.9 6.0 - 8.5 g/dL    Albumin 4.9 (H) 3.8 - 4.8 g/dL    Globulin, Total 2.0 1.5 - 4.5 g/dL    Albumin/Globulin Ratio 2.5 (H) 1.2 - 2.2    Total Bilirubin 0.7 0.0 - 1.2 mg/dL    Alkaline Phosphatase 55 39 - 117 IU/L    AST 16 0 - 40 IU/L    ALT 9 0 - 32 IU/L   Lipid Panel   Result Value Ref Range    Cholesterol 224 (H) 100 - 199 mg/dL    Triglycerides 61 0 - 149 mg/dL    HDL 68 >39 mg/dL    VLDL Cholesterol [place] 12 5 - 40 mg/dL    LDL Calculated 144 (H) 0 - 99 mg/dL   TSH   Result Value Ref Range    TSH 1.840 0.450 - 4.500 uIU/mL   Vitamin D   Result Value Ref Range    Vit D, 25-Hydroxy 45.4 30.0 - 100.0 ng/mL         1/31/22 EKG nsr 60, no q's or acute changes.    Assessment:       1. Encounter for preventive health examination    2. Other  hyperlipidemia    3. Chronic seasonal allergic rhinitis due to pollen    4. Iron deficiency anemia due to chronic blood loss    5. Migraine with aura and without status migrainosus, not intractable    6. Screen for colon cancer    7. FH: thyroid cancer    8. Preop cardiovascular exam    9. Stress incontinence    10. Seborrheic dermatitis    11. Gastroesophageal reflux disease without esophagitis    12. Scalp pruritus          Plan:     Encounter for preventive health examination- fluvax now, lab with [place].  -     CBC Auto Differential; Future; Expected date: 01/31/2022  -     Comprehensive Metabolic Panel; Future; Expected date: 01/31/2022  -     TSH; Future; Expected date: 01/31/2022  -     Hemoglobin A1C; Future; Expected date: 01/31/2022    Preop cardiovascular exam, Stress incontinence- Dr. Shelby, 2/14/22- Pt is clear for anesthesia and surgery with Michael Index Class I, very low risk for perioperative complications.    -     EKG 12-lead; Future  -     APTT; Future; Expected date: 02/14/2022  -     Protime-INR; Future; Expected date: 01/31/2022    Other hyperlipidemia- restart exercise.    Chronic seasonal allergic rhinitis due to pollen, doing well.    Iron deficiency anemia due to chronic blood loss- check lab.    Migraine with aura and without status migrainosus, not intractable- doing well, not even once a month lately.    Screen for colon cancer  -     Case Request Endoscopy: COLONOSCOPY    FH: thyroid cancer  -     US Soft Tissue Head Neck Thyroid; Future; Expected date: 01/31/2022    Seborrheic dermatitis  -     ketoconazole (NIZORAL) 2 % shampoo; Wash hair with medicated shampoo at least 1x/week - let sit on scalp at least 5 minutes prior to rinsing  Dispense: 120 mL; Refill: 5  -     clobetasoL (TEMOVATE) 0.05 % external solution; Apply to affected areas of scalp 1-2 times daily as needed for itch  Dispense: 50 mL; Refill: 3    Gastroesophageal reflux disease without esophagitis- doing well, cont rx.  -      omeprazole (PRILOSEC) 40 MG capsule; Take 1 capsule (40 mg total) by mouth once daily.  Dispense: 90 capsule; Refill: 3

## 2022-02-01 ENCOUNTER — PATIENT MESSAGE (OUTPATIENT)
Dept: FAMILY MEDICINE | Facility: CLINIC | Age: 52
End: 2022-02-01
Payer: COMMERCIAL

## 2022-02-03 ENCOUNTER — HOSPITAL ENCOUNTER (OUTPATIENT)
Dept: RADIOLOGY | Facility: HOSPITAL | Age: 52
Discharge: HOME | End: 2022-02-03
Attending: INTERNAL MEDICINE
Payer: COMMERCIAL

## 2022-02-03 ENCOUNTER — PATIENT MESSAGE (OUTPATIENT)
Dept: FAMILY MEDICINE | Facility: CLINIC | Age: 52
End: 2022-02-03
Payer: COMMERCIAL

## 2022-02-03 DIAGNOSIS — Z80.8 FH: THYROID CANCER: ICD-10-CM

## 2022-02-03 PROCEDURE — 76536 US EXAM OF HEAD AND NECK: CPT | Mod: TC

## 2022-02-03 PROCEDURE — 76536 US SOFT TISSUE HEAD NECK THYROID: ICD-10-PCS | Mod: 26,,, | Performed by: RADIOLOGY

## 2022-02-03 PROCEDURE — 76536 US EXAM OF HEAD AND NECK: CPT | Mod: 26,,, | Performed by: RADIOLOGY

## 2022-04-07 ENCOUNTER — TELEPHONE (OUTPATIENT)
Dept: ADMINISTRATIVE | Facility: HOSPITAL | Age: 52
End: 2022-04-07
Payer: COMMERCIAL

## 2022-04-07 DIAGNOSIS — Z12.11 SCREEN FOR COLON CANCER: Primary | ICD-10-CM

## 2022-04-19 DIAGNOSIS — Z12.11 SCREEN FOR COLON CANCER: Primary | ICD-10-CM

## 2022-04-21 ENCOUNTER — HOSPITAL ENCOUNTER (OUTPATIENT)
Dept: PREADMISSION TESTING | Facility: HOSPITAL | Age: 52
Discharge: HOME | End: 2022-04-21
Attending: INTERNAL MEDICINE
Payer: COMMERCIAL

## 2022-04-21 DIAGNOSIS — Z12.11 SCREEN FOR COLON CANCER: ICD-10-CM

## 2022-05-10 ENCOUNTER — TELEPHONE (OUTPATIENT)
Dept: PREADMISSION TESTING | Facility: HOSPITAL | Age: 52
End: 2022-05-10
Payer: COMMERCIAL

## 2022-05-10 NOTE — TELEPHONE ENCOUNTER
PAT call completed.  Patient educated on procedure instructions.  Medical history discussed and patient informed of arrival time of 9:00 AM on Thursday, May 12, 2022 at the [facility], and was made aware of the limited-visitor policy, and that  is to remain during the entire visit.  All questions and concerns addressed.  Endoscopy instructions reviewed. Instructed no solid food, only clear liquids, the day before the procedure, then at 6 PM, the day before the procedure, drink the first half of the bowel prep, then at 2 AM, the morning of procedure, drink the second half of the prep, then do not eat or drink anything until after the procedure.  Pre-procedure Covid testing not needed, patient is vaccinated. Patient verbalized understanding of all instructions.

## 2022-05-10 NOTE — PRE-PROCEDURE INSTRUCTIONS
PAT call completed.  Patient educated on procedure instructions.  Medical history discussed and patient informed of arrival time of 9:00 AM on Thursday, May 12, 2022 at the [facility], and was made aware of the limited-visitor policy, and that  is to remain during the entire visit.  All questions and concerns addressed.  Endoscopy instructions reviewed. Instructed no solid food, only clear liquids, the day before the procedure, then at 6 PM, the day before the procedure, drink the first half of the bowel prep, then at 2 AM, the morning of procedure, drink the second half of the prep, then do not eat or drink anything until after the procedure.  Pre-procedure Covid testing not needed, patient is vaccinated. Patient verbalized understanding of all instructions.

## 2022-05-11 ENCOUNTER — ANESTHESIA EVENT (OUTPATIENT)
Dept: ENDOSCOPY | Facility: HOSPITAL | Age: 52
End: 2022-05-11
Payer: COMMERCIAL

## 2022-05-11 NOTE — ANESTHESIA PREPROCEDURE EVALUATION
"                                                                                                             05/11/2022  Alexandra Rivas is a 51 y.o., female.  Past Medical History:   Diagnosis Date    Allergic rhinitis     FH: thyroid cancer     Gastritis and duodenitis 4/19/2016    Headache, classical migraine     occasional aura "every now and then"    Vitamin D deficiency disease      Past Surgical History:   Procedure Laterality Date    TONSILLECTOMY      TUBAL LIGATION     No current facility-administered medications for this encounter.    Current Outpatient Medications:     amitriptyline (ELAVIL) 10 MG tablet, Take 1 tablet (10 mg total) by mouth nightly as needed for Insomnia., Disp: 90 tablet, Rfl: 3    clobetasoL (TEMOVATE) 0.05 % external solution, Apply to affected areas of scalp 1-2 times daily as needed for itch, Disp: 50 mL, Rfl: 3    ergocalciferol (VITAMIN D2) 50,000 unit Cap, Take 1 capsule (50,000 Units total) by mouth every 7 days., Disp: 12 capsule, Rfl: 3    estradioL (VAGIFEM) 10 mcg Tab, Place 1 tablet (10 mcg total) vaginally once daily. One tablet intravaginal x 2 weeks then 2-3 times per week., Disp: 30 tablet, Rfl: 1    ketoconazole (NIZORAL) 2 % shampoo, Wash hair with medicated shampoo at least 1x/week - let sit on scalp at least 5 minutes prior to rinsing, Disp: 120 mL, Rfl: 5    omeprazole (PRILOSEC) 40 MG capsule, Take 1 capsule (40 mg total) by mouth once daily., Disp: 90 capsule, Rfl: 3    sod sulf-pot chloride-mag sulf (SUTAB) 1.479-0.188- 0.225 gram tablet, Take 12 tablets by mouth once daily. Use as instructed, Disp: 24 tablet, Rfl: 0    Normal sinus rhythm   Normal ECG   When compared with ECG of 26-FEB-2016 14:39,   Previous ECG has undetermined rhythm, needs review   T wave inversion less evident in Anterior leads   Confirmed by PETER ALEXANDER, SHANNON BROWNING ([id number]) on 2/1/2022 8:21:50 PM   2016  CONCLUSIONS     1 - Concentric remodeling.     2 - Normal left " ventricular systolic function (EF 60-65%).     3 - Normal left ventricular diastolic function.     4 - Normal right ventricular systolic function .     No evidence of stress induced myocardial ischemia.     Pre-op Assessment    I have reviewed the Patient Summary Reports.     I have reviewed the Nursing Notes. I have reviewed the NPO Status.   I have reviewed the Medications.     Review of Systems  Anesthesia Hx:  No problems with previous Anesthesia  Neg history of prior surgery. Denies Family Hx of Anesthesia complications.   Denies Personal Hx of Anesthesia complications.   Social:  Non-Smoker, Social Alcohol Use    Hematology/Oncology:         -- Cancer in past history:   Hepatic/GI:   PUD,    Neurological:   Headaches        Physical Exam  General: Well nourished    Airway:  Mallampati: I / I  Mouth Opening: Normal  TM Distance: Normal  Tongue: Normal  Neck ROM: Normal ROM    Dental:  Intact        Anesthesia Plan  Type of Anesthesia, risks & benefits discussed:    Anesthesia Type: Gen Natural Airway  Intra-op Monitoring Plan: Standard ASA Monitors  Post Op Pain Control Plan: multimodal analgesia  Induction:  IV  Informed Consent: Informed consent signed with the Patient and all parties understand the risks and agree with anesthesia plan.  All questions answered.   ASA Score: 2  Day of Surgery Review of History & Physical: H&P Update referred to the surgeon/provider.    Ready For Surgery From Anesthesia Perspective.     .

## 2022-05-12 ENCOUNTER — ANESTHESIA (OUTPATIENT)
Dept: ENDOSCOPY | Facility: HOSPITAL | Age: 52
End: 2022-05-12
Payer: COMMERCIAL

## 2022-05-12 ENCOUNTER — HOSPITAL ENCOUNTER (OUTPATIENT)
Facility: HOSPITAL | Age: 52
Discharge: HOME | End: 2022-05-12
Attending: INTERNAL MEDICINE | Admitting: INTERNAL MEDICINE
Payer: COMMERCIAL

## 2022-05-12 VITALS
HEART RATE: 64 BPM | SYSTOLIC BLOOD PRESSURE: 114 MMHG | WEIGHT: 114.19 LBS | RESPIRATION RATE: 17 BRPM | HEIGHT: 65 IN | BODY MASS INDEX: 19.03 KG/M2 | DIASTOLIC BLOOD PRESSURE: 67 MMHG | TEMPERATURE: 97 F | OXYGEN SATURATION: 100 %

## 2022-05-12 DIAGNOSIS — Z12.11 COLON CANCER SCREENING: Primary | ICD-10-CM

## 2022-05-12 PROCEDURE — D9220A PRA ANESTHESIA: ICD-10-PCS | Mod: CRNA,,, | Performed by: NURSE ANESTHETIST, CERTIFIED REGISTERED

## 2022-05-12 PROCEDURE — G0121 COLON CA SCRN NOT HI RSK IND: HCPCS | Performed by: INTERNAL MEDICINE

## 2022-05-12 PROCEDURE — 25000003 PHARM REV CODE 250: Performed by: NURSE ANESTHETIST, CERTIFIED REGISTERED

## 2022-05-12 PROCEDURE — D9220A PRA ANESTHESIA: ICD-10-PCS | Mod: ANES,,, | Performed by: ANESTHESIOLOGY

## 2022-05-12 PROCEDURE — 37000009 HC ANESTHESIA EA ADD 15 MINS: Performed by: INTERNAL MEDICINE

## 2022-05-12 PROCEDURE — G0121 COLON CA SCRN NOT HI RSK IND: HCPCS | Mod: ,,, | Performed by: INTERNAL MEDICINE

## 2022-05-12 PROCEDURE — 63600175 PHARM REV CODE 636 W HCPCS: Performed by: NURSE ANESTHETIST, CERTIFIED REGISTERED

## 2022-05-12 PROCEDURE — D9220A PRA ANESTHESIA: Mod: CRNA,,, | Performed by: NURSE ANESTHETIST, CERTIFIED REGISTERED

## 2022-05-12 PROCEDURE — D9220A PRA ANESTHESIA: Mod: ANES,,, | Performed by: ANESTHESIOLOGY

## 2022-05-12 PROCEDURE — G0121 COLON CA SCRN NOT HI RSK IND: ICD-10-PCS | Mod: ,,, | Performed by: INTERNAL MEDICINE

## 2022-05-12 PROCEDURE — 37000008 HC ANESTHESIA 1ST 15 MINUTES: Performed by: INTERNAL MEDICINE

## 2022-05-12 PROCEDURE — 63600175 PHARM REV CODE 636 W HCPCS: Performed by: INTERNAL MEDICINE

## 2022-05-12 RX ORDER — PROPOFOL 10 MG/ML
VIAL (ML) INTRAVENOUS
Status: DISCONTINUED | OUTPATIENT
Start: 2022-05-12 | End: 2022-05-12

## 2022-05-12 RX ORDER — LIDOCAINE HYDROCHLORIDE 20 MG/ML
INJECTION INTRAVENOUS
Status: DISCONTINUED | OUTPATIENT
Start: 2022-05-12 | End: 2022-05-12

## 2022-05-12 RX ORDER — SODIUM CHLORIDE, SODIUM LACTATE, POTASSIUM CHLORIDE, CALCIUM CHLORIDE 600; 310; 30; 20 MG/100ML; MG/100ML; MG/100ML; MG/100ML
INJECTION, SOLUTION INTRAVENOUS CONTINUOUS
Status: DISCONTINUED | OUTPATIENT
Start: 2022-05-12 | End: 2022-05-12 | Stop reason: HOSPADM

## 2022-05-12 RX ADMIN — PROPOFOL 100 MG: 10 INJECTION, EMULSION INTRAVENOUS at 10:05

## 2022-05-12 RX ADMIN — Medication 75 MG: at 10:05

## 2022-05-12 RX ADMIN — SODIUM CHLORIDE, SODIUM LACTATE, POTASSIUM CHLORIDE, AND CALCIUM CHLORIDE: .6; .31; .03; .02 INJECTION, SOLUTION INTRAVENOUS at 09:05

## 2022-05-12 NOTE — ANESTHESIA POSTPROCEDURE EVALUATION
Anesthesia Post Evaluation    Patient: Alexandra Rivas    Procedure(s) Performed: Procedure(s) (LRB):  COLONOSCOPY (N/A)    Final Anesthesia Type: general      Patient location during evaluation: PACU  Patient participation: Yes- Able to Participate  Level of consciousness: awake and alert and oriented  Post-procedure vital signs: reviewed and stable  Pain management: adequate  Airway patency: patent    PONV status at discharge: No PONV  Anesthetic complications: no      Cardiovascular status: blood pressure returned to baseline, stable and hemodynamically stable  Respiratory status: unassisted  Hydration status: euvolemic  Follow-up not needed.          Vitals Value Taken Time   /67 05/12/22 1051   Temp 36.2 °C (97.2 °F) 05/12/22 1025   Pulse 64 05/12/22 1051   Resp 17 05/12/22 1051   SpO2 100 % 05/12/22 1051         Event Time   Out of Recovery 10:55:00         Pain/Harvey Score: Harvey Score: 10 (5/12/2022 10:55 AM)

## 2022-05-12 NOTE — PLAN OF CARE
Discharge instructions reviewed with patient and visitor. Handouts given & verbalized understanding with no further questions at this time.  spoke to pt at bedside, reviewed procedure and findings, answered questions. MD telephone number provided per AVS sheet. VSS on RA, no pain or nausea noted, tolerating po fluids, no complaints noted. Fall precautions reviewed, consents in chart, PIV removed at this time.

## 2022-05-12 NOTE — [employer] PATIENT INSTRUCTIONS
Discharge Summary/Instructions after an Endoscopic Procedure  Patient Name: Alexandra Rivas  Patient MRN: [id number]  Patient YOB: 1970  Thursday, May 12, 2022  Néstor Mitchell MD  Dear patient,  As a result of recent federal legislation (The Federal Cures Act), you may   receive lab or pathology results from your procedure in your [facility]   account before your physician is able to contact you. Your physician or   their representative will relay the results to you with their   recommendations at their soonest availability.  Thank you,  RESTRICTIONS:  During your procedure today, you received medications for sedation.  These   medications may affect your judgment, balance and coordination.  Therefore,   for 24 hours, you have the following restrictions:   - DO NOT drive a car, operate machinery, make legal/financial decisions,   sign important papers or drink alcohol.    ACTIVITY:  Today: no heavy lifting, straining or running due to procedural   sedation/anesthesia.  The following day: return to full activity including work.  DIET:  Eat and drink normally unless instructed otherwise.     TREATMENT FOR COMMON SIDE EFFECTS:  - Mild abdominal pain, nausea, belching, bloating or excessive gas:  rest,   eat lightly and use a heating pad.  - Sore Throat: treat with throat lozenges and/or gargle with warm salt   water.  - Because air was used during the procedure, expelling large amounts of air   from your rectum or belching is normal.  - If a bowel prep was taken, you may not have a bowel movement for 1-3 days.    This is normal.  SYMPTOMS TO WATCH FOR AND REPORT TO YOUR PHYSICIAN:  1. Abdominal pain or bloating, other than gas cramps.  2. Chest pain.  3. Back pain.  4. Signs of infection such as: chills or fever occurring within 24 hours   after the procedure.  5. Rectal bleeding, which would show as bright red, maroon, or black stools.   (A tablespoon of blood from the rectum is not serious, especially if    hemorrhoids are present.)  6. Vomiting.  7. Weakness or dizziness.  GO DIRECTLY TO THE NEAREST EMERGENCY ROOM IF YOU HAVE ANY OF THE FOLLOWING:      Difficulty breathing              Chills and/or fever over 101 F   Persistent vomiting and/or vomiting blood   Severe abdominal pain   Severe chest pain   Black, tarry stools   Bleeding- more than one tablespoon   Any other symptom or condition that you feel may need urgent attention  Your doctor recommends these additional instructions:  If any biopsies were taken, your doctors clinic will contact you in 1 to 2   weeks with any results.  - Discharge patient to home.   - Resume previous diet.   - Continue present medications.   - Repeat colonoscopy in 10 years for screening purposes.   - Return to referring physician.   - Patient has a contact number available for emergencies.  The signs and   symptoms of potential delayed complications were discussed with the   patient.  Return to normal activities tomorrow.  Written discharge   instructions were provided to the patient.  For questions, problems or results please call your physician Néstor Mitchell MD at Work:  [phone]  If you have any questions about the above instructions, call the GI   department at [phone] or call the endoscopy unit at [phone]   from 7am until 3 pm.  [facility], EMERGENCY ROOM PHONE NUMBER:   [phone]  IF A COMPLICATION OR EMERGENCY SITUATION ARISES AND YOU ARE UNABLE TO REACH   YOUR PHYSICIAN - GO DIRECTLY TO THE EMERGENCY ROOM.  I have read or have had read to me these discharge instructions for my   procedure and have received a written copy.  I understand these   instructions and will follow-up with my physician if I have any questions.     __________________________________       _____________________________________  Nurse Signature                                          Patient/Designated   Responsible Party Signature  Néstor GREEN  MD Stephen  5/12/2022 10:23:15 AM  This report has been verified and signed electronically.  Dear patient,  As a result of recent federal legislation (The Federal Cures Act), you may   receive lab or pathology results from your procedure in your [facility]   account before your physician is able to contact you. Your physician or   their representative will relay the results to you with their   recommendations at their soonest availability.  Thank you,  PR[employer]ATION

## 2022-05-12 NOTE — H&P
"PRE PROCEDURE H&P    Patient Name: Alexandra Rivas  MRN: [id number]  : 1970  Date of Procedure:  2022  Referring Physician: Néstor Mitchell MD  Primary Physician: Teetee Moran MD  Procedure Physician: Néstor Mitchell MD       Planned Procedure: Colonoscopy  Diagnosis: screening for colon cancer  Chief Complaint: Same as above    HPI: Patient is an 51 y.o. female is here for the above.     Last colonoscopy: > 10 years ago   Family history: None   Anticoagulation: None     Past Medical History:   Past Medical History:   Diagnosis Date    Allergic rhinitis     FH: thyroid cancer     Gastritis and duodenitis 2016    Headache, classical migraine     occasional aura "every now and then"    Vitamin D deficiency disease         Past Surgical History:  Past Surgical History:   Procedure Laterality Date    TONSILLECTOMY      TUBAL LIGATION          Home Medications:  Prior to Admission medications    Medication Sig Start Date End Date Taking? Authorizing Provider   ergocalciferol (VITAMIN D2) 50,000 unit Cap Take 1 capsule (50,000 Units total) by mouth every 7 days. 22  Yes Teetee Moran MD   estradioL (VAGIFEM) 10 mcg Tab Place 1 tablet (10 mcg total) vaginally once daily. One tablet intravaginal x 2 weeks then 2-3 times per week. 20 Yes Onelia Acevedo MD   omeprazole (PRILOSEC) 40 MG capsule Take 1 capsule (40 mg total) by mouth once daily. 22  Yes Teetee Moran MD   sod sulf-pot chloride-mag sulf (SUTAB) 1.479-0.188- 0.225 gram tablet Take 12 tablets by mouth once daily. Use as instructed 22  Yes Talat Jackman PA-C   amitriptyline (ELAVIL) 10 MG tablet Take 1 tablet (10 mg total) by mouth nightly as needed for Insomnia. 20   Teetee Moran MD   clobetasoL (TEMOVATE) 0.05 % external solution Apply to affected areas of scalp 1-2 times daily as needed for itch 22   Teetee Moran MD   ketoconazole (NIZORAL) 2 % shampoo Wash " "hair with medicated shampoo at least 1x/week - let sit on scalp at least 5 minutes prior to rinsing 1/31/22   Teetee Moran MD        Allergies:  Review of patient's allergies indicates:   Allergen Reactions    Oxycodone-acetaminophen Itching        Social History:   Social History     Socioeconomic History    Marital status:    Tobacco Use    Smoking status: Never Smoker    Smokeless tobacco: Never Used   Substance and Sexual Activity    Alcohol use: Yes     Alcohol/week: 0.0 standard drinks     Comment: occasionally    Drug use: No    Sexual activity: Yes     Partners: Male     Birth control/protection: Surgical     Comment: BTL; mut monog   Other Topics Concern    Are you pregnant or think you may be? No    Breast-feeding No       Family History:  Family History   Problem Relation Age of Onset    Hypertension Mother     Psoriasis Mother     Aneurysm Father     Thyroid cancer Unknown         papillary, mother and sister 43y.    Hypertension Sister     Psoriasis Sister     Diabetes Paternal Grandmother     Psoriasis Maternal Grandmother     Breast cancer Neg Hx     Colon cancer Neg Hx     Ovarian cancer Neg Hx     Melanoma Neg Hx     Lupus Neg Hx     Eczema Neg Hx     Thrombophilia Neg Hx        ROS: No acute cardiac events, no acute respiratory complaints.     Physical Exam (all patients):    BP (!) 141/74 (BP Location: Right arm, Patient Position: Sitting)   Pulse 86   Temp 98 °F (36.7 °C) (Temporal)   Resp 16   Ht 5' 5" (1.651 m)   Wt 51.8 kg (114 lb 3.2 oz)   LMP 12/23/2017 (Approximate)   SpO2 100%   Breastfeeding No Comment: FOR 5 YEARS  BMI 19.00 kg/m²   Lungs: Clear to auscultation bilaterally, respirations unlabored  Heart: Regular rate and rhythm, S1 and S2 normal, no obvious murmurs  Abdomen:         Soft, non-tender, bowel sounds normal, no masses, no organomegaly    Lab Results   Component Value Date    WBC 4.93 01/31/2022    MCV 90 01/31/2022    RDW 13.0 " 01/31/2022     01/31/2022    INR 1.0 01/31/2022    GLU 89 01/31/2022    HGBA1C 5.4 01/31/2022    BUN 13 01/31/2022     01/31/2022    K 4.3 01/31/2022     01/31/2022        SEDATION PLAN: per anesthesia      History reviewed, vital signs satisfactory, cardiopulmonary status satisfactory, sedation options, risks and plans have been discussed with the patient  All their questions were answered and the patient agrees to the sedation procedures as planned and the patient is deemed an appropriate candidate for the sedation as planned.    Procedure explained to patient, informed consent obtained and placed in chart.    Néstor Mitchell  5/12/2022  9:55 AM

## 2022-07-20 NOTE — PROGRESS NOTES
"Subjective:      Patient ID: Alexandra Rivas is a 51 y.o. female.    Chief Complaint: No chief complaint on file.      HPI  Here for follow up of medical problems.  Bladder surgery went well.  HF much less, during night has about 2.  Better on vit E.  No iron, on prilosec and stable.  Energy ok.  Some sinus drainage lately, not taking flonase lately.  Recent migraine, using elavil "prn."  Active, but no regular exercise.    Updated/ annual due 1/23:  HM: 1/22 fluvax, 3/21 covid vaccines, 11/19 HAV, 5/20 Td, 8/10 TDaP, 8/22 today Shingrix #1, 10/21 [facility]G/ [facility], 5/22 Cscope rep 10y, 2/22 thyroid u/s neg to be repeated yearly.     Review of Systems   Constitutional: Negative for appetite change, chills, diaphoresis and fever.   HENT: Negative for congestion, ear pain, rhinorrhea, sinus pressure and sore throat.    Respiratory: Negative for cough, chest tightness and shortness of breath.    Cardiovascular: Negative for chest pain, palpitations and leg swelling.   Gastrointestinal: Negative for blood in stool, constipation, diarrhea, nausea and vomiting.   Genitourinary: Negative for dysuria, frequency, hematuria, menstrual problem, urgency and vaginal discharge.   Musculoskeletal: Negative for arthralgias.   Skin: Negative for rash.   Neurological: Negative for dizziness and headaches.   Psychiatric/Behavioral: Negative for sleep disturbance. The patient is not nervous/anxious.          Objective:   /82 (BP Location: Left arm)   Pulse 86   Temp 98.1 °F (36.7 °C) (Tympanic)   Wt 51 kg (112 lb 7 oz)   LMP 12/23/2017 (Approximate)   SpO2 96%   BMI 18.71 kg/m²     Physical Exam  Constitutional:       Appearance: She is well-developed.   Neck:      Thyroid: No thyroid mass.      Vascular: No carotid bruit.   Cardiovascular:      Rate and Rhythm: Normal rate and regular rhythm.      Heart sounds: No murmur heard.    No friction rub. No gallop.   Pulmonary:      Effort: Pulmonary effort is normal. "      Breath sounds: Normal breath sounds. No wheezing or rales.   Abdominal:      General: Bowel sounds are normal.      Palpations: Abdomen is soft. There is no mass.      Tenderness: There is no abdominal tenderness.   Musculoskeletal:      Cervical back: Neck supple.   Lymphadenopathy:      Cervical: No cervical adenopathy.   Neurological:      Mental Status: She is alert and oriented to person, place, and time.             Assessment:       1. Migraine with aura and without status migrainosus, not intractable    2. Other hyperlipidemia    3. Stress incontinence    4. Vitamin D deficiency disease    5. Preventive measure    6. Menopausal disorder    7. Gastroesophageal reflux disease without esophagitis          Plan:     Migraine with aura and without status migrainosus, not intractable- elavil prn.    Other hyperlipidemia- start exercise, recheck 6mo.  -     CBC Auto Differential; Future; Expected date: 08/01/2022  -     Comprehensive Metabolic Panel; Future; Expected date: 08/01/2022  -     Lipid Panel; Future; Expected date: 08/01/2022  -     TSH; Future; Expected date: 08/01/2022    Stress incontinence, s/p lift.    Vitamin D deficiency disease- cont supp.    Preventive measure- Shingrix now, nurse visit in 3mo for #2.    Menopausal disorder- doing well, cont vit E.    Gastroesophageal reflux disease without esophagitis- cont PPI.

## 2022-08-01 ENCOUNTER — OFFICE VISIT (OUTPATIENT)
Dept: FAMILY MEDICINE | Facility: CLINIC | Age: 52
End: 2022-08-01
Payer: COMMERCIAL

## 2022-08-01 VITALS
BODY MASS INDEX: 18.71 KG/M2 | HEART RATE: 86 BPM | SYSTOLIC BLOOD PRESSURE: 112 MMHG | WEIGHT: 112.44 LBS | TEMPERATURE: 98 F | OXYGEN SATURATION: 96 % | DIASTOLIC BLOOD PRESSURE: 82 MMHG

## 2022-08-01 DIAGNOSIS — N39.3 STRESS INCONTINENCE: ICD-10-CM

## 2022-08-01 DIAGNOSIS — G43.109 MIGRAINE WITH AURA AND WITHOUT STATUS MIGRAINOSUS, NOT INTRACTABLE: Primary | ICD-10-CM

## 2022-08-01 DIAGNOSIS — Z29.9 PREVENTIVE MEASURE: ICD-10-CM

## 2022-08-01 DIAGNOSIS — E78.49 OTHER HYPERLIPIDEMIA: ICD-10-CM

## 2022-08-01 DIAGNOSIS — K21.9 GASTROESOPHAGEAL REFLUX DISEASE WITHOUT ESOPHAGITIS: ICD-10-CM

## 2022-08-01 DIAGNOSIS — E55.9 VITAMIN D DEFICIENCY DISEASE: ICD-10-CM

## 2022-08-01 DIAGNOSIS — N95.9 MENOPAUSAL DISORDER: ICD-10-CM

## 2022-08-01 PROBLEM — D50.9 IRON DEFICIENCY ANEMIA: Status: RESOLVED | Noted: 2017-02-28 | Resolved: 2022-08-01

## 2022-08-01 PROBLEM — D50.0 IRON DEFICIENCY ANEMIA DUE TO CHRONIC BLOOD LOSS: Status: RESOLVED | Noted: 2017-09-11 | Resolved: 2022-08-01

## 2022-08-01 PROCEDURE — 3008F PR BODY MASS INDEX (BMI) DOCUMENTED: ICD-10-PCS | Mod: CPTII,S$GLB,, | Performed by: INTERNAL MEDICINE

## 2022-08-01 PROCEDURE — 3074F SYST BP LT 130 MM HG: CPT | Mod: CPTII,S$GLB,, | Performed by: INTERNAL MEDICINE

## 2022-08-01 PROCEDURE — 1159F MED LIST DOCD IN RCRD: CPT | Mod: CPTII,S$GLB,, | Performed by: INTERNAL MEDICINE

## 2022-08-01 PROCEDURE — 99999 PR PBB SHADOW E&M-EST. PATIENT-LVL III: CPT | Mod: PBBFAC,,, | Performed by: INTERNAL MEDICINE

## 2022-08-01 PROCEDURE — 3044F HG A1C LEVEL LT 7.0%: CPT | Mod: CPTII,S$GLB,, | Performed by: INTERNAL MEDICINE

## 2022-08-01 PROCEDURE — 90750 ZOSTER RECOMBINANT VACCINE: ICD-10-PCS | Mod: S$GLB,,, | Performed by: INTERNAL MEDICINE

## 2022-08-01 PROCEDURE — 3079F PR MOST RECENT DIASTOLIC BLOOD PRESSURE 80-89 MM HG: ICD-10-PCS | Mod: CPTII,S$GLB,, | Performed by: INTERNAL MEDICINE

## 2022-08-01 PROCEDURE — 1159F PR MEDICATION LIST DOCUMENTED IN MEDICAL RECORD: ICD-10-PCS | Mod: CPTII,S$GLB,, | Performed by: INTERNAL MEDICINE

## 2022-08-01 PROCEDURE — 90471 IMMUNIZATION ADMIN: CPT | Mod: S$GLB,,, | Performed by: INTERNAL MEDICINE

## 2022-08-01 PROCEDURE — 99214 PR OFFICE/OUTPT VISIT, EST, LEVL IV, 30-39 MIN: ICD-10-PCS | Mod: 25,S$GLB,, | Performed by: INTERNAL MEDICINE

## 2022-08-01 PROCEDURE — 3079F DIAST BP 80-89 MM HG: CPT | Mod: CPTII,S$GLB,, | Performed by: INTERNAL MEDICINE

## 2022-08-01 PROCEDURE — 90750 HZV VACC RECOMBINANT IM: CPT | Mod: S$GLB,,, | Performed by: INTERNAL MEDICINE

## 2022-08-01 PROCEDURE — 99999 PR PBB SHADOW E&M-EST. PATIENT-LVL III: ICD-10-PCS | Mod: PBBFAC,,, | Performed by: INTERNAL MEDICINE

## 2022-08-01 PROCEDURE — 99214 OFFICE O/P EST MOD 30 MIN: CPT | Mod: 25,S$GLB,, | Performed by: INTERNAL MEDICINE

## 2022-08-01 PROCEDURE — 3074F PR MOST RECENT SYSTOLIC BLOOD PRESSURE < 130 MM HG: ICD-10-PCS | Mod: CPTII,S$GLB,, | Performed by: INTERNAL MEDICINE

## 2022-08-01 PROCEDURE — 90471 ZOSTER RECOMBINANT VACCINE: ICD-10-PCS | Mod: S$GLB,,, | Performed by: INTERNAL MEDICINE

## 2022-08-01 PROCEDURE — 3044F PR MOST RECENT HEMOGLOBIN A1C LEVEL <7.0%: ICD-10-PCS | Mod: CPTII,S$GLB,, | Performed by: INTERNAL MEDICINE

## 2022-08-01 PROCEDURE — 3008F BODY MASS INDEX DOCD: CPT | Mod: CPTII,S$GLB,, | Performed by: INTERNAL MEDICINE

## 2022-10-21 DIAGNOSIS — Z12.31 OTHER SCREENING MAMMOGRAM: ICD-10-CM

## 2022-11-01 ENCOUNTER — CLINICAL SUPPORT (OUTPATIENT)
Dept: FAMILY MEDICINE | Facility: CLINIC | Age: 52
End: 2022-11-01
Payer: COMMERCIAL

## 2022-11-01 DIAGNOSIS — Z23 NEED FOR SHINGLES VACCINE: Primary | ICD-10-CM

## 2022-11-01 PROCEDURE — 90750 HZV VACC RECOMBINANT IM: CPT | Mod: S$GLB,,, | Performed by: INTERNAL MEDICINE

## 2022-11-01 PROCEDURE — 90750 ZOSTER RECOMBINANT VACCINE: ICD-10-PCS | Mod: S$GLB,,, | Performed by: INTERNAL MEDICINE

## 2022-11-01 PROCEDURE — 90471 IMMUNIZATION ADMIN: CPT | Mod: S$GLB,,, | Performed by: INTERNAL MEDICINE

## 2022-11-01 PROCEDURE — 99999 PR PBB SHADOW E&M-EST. PATIENT-LVL II: CPT | Mod: PBBFAC,,,

## 2022-11-01 PROCEDURE — 90471 ZOSTER RECOMBINANT VACCINE: ICD-10-PCS | Mod: S$GLB,,, | Performed by: INTERNAL MEDICINE

## 2022-11-01 PROCEDURE — 99999 PR PBB SHADOW E&M-EST. PATIENT-LVL II: ICD-10-PCS | Mod: PBBFAC,,,

## 2022-11-01 NOTE — PROGRESS NOTES
Shingrex # 2 order obtained from provider. Pt. Verified using 2 identifiers. Meds and allergies reviewed. Injection administered per md order. PT. Tolerated well instructed to wait in clinic 15 minutes. Pt. Verbalized understanding.

## 2023-03-10 ENCOUNTER — OFFICE VISIT (OUTPATIENT)
Dept: INTERNAL MEDICINE | Facility: CLINIC | Age: 53
End: 2023-03-10
Payer: COMMERCIAL

## 2023-03-10 VITALS
DIASTOLIC BLOOD PRESSURE: 78 MMHG | BODY MASS INDEX: 19.04 KG/M2 | TEMPERATURE: 98 F | WEIGHT: 114.44 LBS | HEART RATE: 85 BPM | OXYGEN SATURATION: 100 % | SYSTOLIC BLOOD PRESSURE: 118 MMHG | RESPIRATION RATE: 18 BRPM

## 2023-03-10 DIAGNOSIS — Z11.59 ENCOUNTER FOR HEPATITIS C SCREENING TEST FOR LOW RISK PATIENT: ICD-10-CM

## 2023-03-10 DIAGNOSIS — E78.49 OTHER HYPERLIPIDEMIA: ICD-10-CM

## 2023-03-10 DIAGNOSIS — N95.9 MENOPAUSAL DISORDER: ICD-10-CM

## 2023-03-10 DIAGNOSIS — E55.9 VITAMIN D DEFICIENCY DISEASE: ICD-10-CM

## 2023-03-10 DIAGNOSIS — L29.9 SCALP PRURITUS: ICD-10-CM

## 2023-03-10 DIAGNOSIS — Z80.0 FH: COLON CANCER IN FIRST DEGREE RELATIVE <60 YEARS OLD: ICD-10-CM

## 2023-03-10 DIAGNOSIS — L21.9 SEBORRHEIC DERMATITIS: ICD-10-CM

## 2023-03-10 DIAGNOSIS — K21.9 GASTROESOPHAGEAL REFLUX DISEASE WITHOUT ESOPHAGITIS: ICD-10-CM

## 2023-03-10 DIAGNOSIS — G43.109 MIGRAINE WITH AURA AND WITHOUT STATUS MIGRAINOSUS, NOT INTRACTABLE: ICD-10-CM

## 2023-03-10 DIAGNOSIS — Z80.8 FH: THYROID CANCER: ICD-10-CM

## 2023-03-10 DIAGNOSIS — Z00.00 WELL ADULT EXAM: Primary | ICD-10-CM

## 2023-03-10 DIAGNOSIS — M85.89 OSTEOPENIA OF MULTIPLE SITES: ICD-10-CM

## 2023-03-10 PROCEDURE — 3078F PR MOST RECENT DIASTOLIC BLOOD PRESSURE < 80 MM HG: ICD-10-PCS | Mod: CPTII,S$GLB,, | Performed by: PEDIATRICS

## 2023-03-10 PROCEDURE — 3008F PR BODY MASS INDEX (BMI) DOCUMENTED: ICD-10-PCS | Mod: CPTII,S$GLB,, | Performed by: PEDIATRICS

## 2023-03-10 PROCEDURE — 3078F DIAST BP <80 MM HG: CPT | Mod: CPTII,S$GLB,, | Performed by: PEDIATRICS

## 2023-03-10 PROCEDURE — 99999 PR PBB SHADOW E&M-EST. PATIENT-LVL III: ICD-10-PCS | Mod: PBBFAC,,, | Performed by: PEDIATRICS

## 2023-03-10 PROCEDURE — 1159F MED LIST DOCD IN RCRD: CPT | Mod: CPTII,S$GLB,, | Performed by: PEDIATRICS

## 2023-03-10 PROCEDURE — 3074F PR MOST RECENT SYSTOLIC BLOOD PRESSURE < 130 MM HG: ICD-10-PCS | Mod: CPTII,S$GLB,, | Performed by: PEDIATRICS

## 2023-03-10 PROCEDURE — 1160F PR REVIEW ALL MEDS BY PRESCRIBER/CLIN PHARMACIST DOCUMENTED: ICD-10-PCS | Mod: CPTII,S$GLB,, | Performed by: PEDIATRICS

## 2023-03-10 PROCEDURE — 99396 PREV VISIT EST AGE 40-64: CPT | Mod: S$GLB,,, | Performed by: PEDIATRICS

## 2023-03-10 PROCEDURE — 3008F BODY MASS INDEX DOCD: CPT | Mod: CPTII,S$GLB,, | Performed by: PEDIATRICS

## 2023-03-10 PROCEDURE — 99396 PR PREVENTIVE VISIT,EST,40-64: ICD-10-PCS | Mod: S$GLB,,, | Performed by: PEDIATRICS

## 2023-03-10 PROCEDURE — 3074F SYST BP LT 130 MM HG: CPT | Mod: CPTII,S$GLB,, | Performed by: PEDIATRICS

## 2023-03-10 PROCEDURE — 1159F PR MEDICATION LIST DOCUMENTED IN MEDICAL RECORD: ICD-10-PCS | Mod: CPTII,S$GLB,, | Performed by: PEDIATRICS

## 2023-03-10 PROCEDURE — 99999 PR PBB SHADOW E&M-EST. PATIENT-LVL III: CPT | Mod: PBBFAC,,, | Performed by: PEDIATRICS

## 2023-03-10 PROCEDURE — 1160F RVW MEDS BY RX/DR IN RCRD: CPT | Mod: CPTII,S$GLB,, | Performed by: PEDIATRICS

## 2023-03-10 RX ORDER — OMEPRAZOLE 40 MG/1
40 CAPSULE, DELAYED RELEASE ORAL DAILY
Qty: 90 CAPSULE | Refills: 3 | Status: SHIPPED | OUTPATIENT
Start: 2023-03-10

## 2023-03-10 RX ORDER — KETOCONAZOLE 20 MG/ML
SHAMPOO, SUSPENSION TOPICAL
Qty: 120 ML | Refills: 5 | Status: SHIPPED | OUTPATIENT
Start: 2023-03-10

## 2023-03-10 RX ORDER — ERGOCALCIFEROL 1.25 MG/1
50000 CAPSULE ORAL
Qty: 12 CAPSULE | Refills: 3 | Status: SHIPPED | OUTPATIENT
Start: 2023-03-10 | End: 2024-04-01

## 2023-03-10 RX ORDER — ESTRADIOL 0.1 MG/G
1 CREAM VAGINAL
Start: 2023-03-13 | End: 2024-03-12

## 2023-03-10 RX ORDER — CLOBETASOL PROPIONATE 0.46 MG/ML
SOLUTION TOPICAL
Qty: 50 ML | Refills: 3 | Status: SHIPPED | OUTPATIENT
Start: 2023-03-10

## 2023-03-10 RX ORDER — AMITRIPTYLINE HYDROCHLORIDE 10 MG/1
10 TABLET, FILM COATED ORAL NIGHTLY PRN
Qty: 90 TABLET | Refills: 3 | Status: SHIPPED | OUTPATIENT
Start: 2023-03-10

## 2023-03-10 NOTE — PROGRESS NOTES
Subjective:       Patient ID: Alexandra Rivas is a 52 y.o. female.    Chief Complaint: Establish Care (PT PRESENTS TO CLINIC TO [facility].)    Alexandra Rivas is a 52 y.o. female who presents to clinic to establish care     Having L lower back pain. No known trauma or injury. Has had her GERD flare in past causing similar pain.     Went to  and was told she has TMJ. Was tender, has resolved. Was given antiinflammatory.     PMHx, PSHx, SocHx, and FHx reviewed and discussed with patient. Followed by outside OBGYN. Has upcoming MMG.        Migraine with aura: quiet on elavil      FH: thyroid cancer: regular checking of TSH and US     FH: colon cancer: sister recently Dx with colon cancer at 51yo     Vitamin D: on weekly D     Myofascial pain     Gastritis/duodenitis/GERD: quiet on Prilosec      LIPIDS: following D&E          Past Surgical History:  5/12/2022: COLONOSCOPY; N/A      Comment:  Procedure: COLONOSCOPY;  Surgeon: Néstor Mitchell MD;  Location: [facility];  Service: Gastroenterology;                 Laterality: N/A;  No date: TONSILLECTOMY  No date: TUBAL LIGATION    Review of patient's family history indicates:    Social History    Socioeconomic History      Marital status:     Tobacco Use      Smoking status: Never      Smokeless tobacco: Never    Substance and Sexual Activity      Alcohol use: Yes        Alcohol/week: 0.0 standard drinks        Comment: occasionally      Drug use: No      Sexual activity: Yes        Partners: Male        Birth control/protection: Surgical        Comment: BTL; mut monog    Other Topics      Concerns:        Are you pregnant or think you may be?: No        Breast-feeding: No    Social Determinants of Health  Financial Resource Strain: Low Risk       Difficulty of Paying Living Expenses: Not hard at all  Food Insecurity: No Food Insecurity      Worried About Running Out of Food in the Last Year: Never true      Ran Out of Food in the  Last Year: Never true  Transportation Needs: No Transportation Needs      Lack of Transportation (Medical): No      Lack of Transportation (Non-Medical): No  Physical Activity: Inactive      Days of Exercise per Week: 0 days      Minutes of Exercise per Session: 0 min  Stress: No Stress Concern Present      Feeling of Stress : Only a little  Social Connections: Unknown      Frequency of Communication with Friends and Family: More than three times a week      Frequency of Social Gatherings with Friends and Family: Once a week      Active Member of Clubs or Organizations: Yes      Attends Club or Organization Meetings: More than 4 times per year      Marital Status:   Housing Stability: Low Risk       Unable to Pay for Housing in the Last Year: No      Number of Places Lived in the Last Year: 1      Unstable Housing in the Last Year: No     Review of Systems   Constitutional:  Negative for activity change, appetite change, chills, diaphoresis, fatigue, fever and unexpected weight change.   HENT:  Negative for nasal congestion, ear pain, mouth sores, nosebleeds, postnasal drip, rhinorrhea, sneezing and sore throat.    Eyes:  Negative for photophobia, pain, discharge, redness and visual disturbance.   Respiratory:  Negative for cough, chest tightness, shortness of breath, wheezing and stridor.    Cardiovascular:  Negative for chest pain, palpitations and leg swelling.   Gastrointestinal:  Negative for constipation, diarrhea, nausea and vomiting.   Genitourinary:  Negative for decreased urine volume, difficulty urinating, dysuria, flank pain, frequency, hematuria and urgency.   Musculoskeletal:  Positive for back pain. Negative for arthralgias, joint swelling, neck pain and neck stiffness.   Integumentary:  Negative for color change and rash.   Neurological:  Negative for dizziness, syncope, speech difficulty, weakness, light-headedness and headaches.   Hematological:  Negative for adenopathy. Does not  bruise/bleed easily.   Psychiatric/Behavioral:  Negative for confusion, decreased concentration, dysphoric mood, hallucinations, sleep disturbance and suicidal ideas. The patient is not nervous/anxious.    All other systems reviewed and are negative.      Objective:      Physical Exam  Vitals and nursing note reviewed.   Constitutional:       General: She is not in acute distress.     Appearance: She is well-developed.   Neck:      Thyroid: No thyromegaly.      Vascular: No JVD.   Cardiovascular:      Rate and Rhythm: Normal rate and regular rhythm.      Heart sounds: Normal heart sounds. No murmur heard.  Pulmonary:      Effort: Pulmonary effort is normal. No respiratory distress.      Breath sounds: Normal breath sounds. No wheezing or rales.   Abdominal:      General: There is no distension.      Palpations: Abdomen is soft. There is no mass.      Tenderness: There is no abdominal tenderness. There is no guarding.   Musculoskeletal:      Right lower leg: No edema.      Left lower leg: No edema.      Comments: Slightly tender over R TMJ   Lymphadenopathy:      Cervical: No cervical adenopathy.   Skin:     Capillary Refill: Capillary refill takes less than 2 seconds.      Findings: No rash.   Neurological:      General: No focal deficit present.      Mental Status: She is alert and oriented to person, place, and time.      Cranial Nerves: No cranial nerve deficit.      Coordination: Coordination normal.   Psychiatric:         Mood and Affect: Mood normal.         Behavior: Behavior normal.         Thought Content: Thought content normal.         Judgment: Judgment normal.       Assessment:       Problem List Items Addressed This Visit       FH: colon cancer in first degree relative <60 years old    FH: thyroid cancer    Relevant Orders    US Thyroid    TSH    Gastroesophageal reflux disease without esophagitis    Relevant Medications    omeprazole (PRILOSEC) 40 MG capsule    Menopausal disorder    Relevant  Medications    amitriptyline (ELAVIL) 10 MG tablet    Migraine with aura and without status migrainosus, not intractable    Relevant Medications    amitriptyline (ELAVIL) 10 MG tablet    Osteopenia of multiple sites    Other hyperlipidemia    Relevant Orders    Lipid Panel    Comprehensive Metabolic Panel    Vitamin D deficiency disease    Relevant Orders    Vitamin D     Other Visit Diagnoses       Well adult exam    -  Primary    Relevant Orders    CBC Auto Differential    Scalp pruritus        Relevant Medications    clobetasoL (TEMOVATE) 0.05 % external solution    Seborrheic dermatitis        Relevant Medications    ketoconazole (NIZORAL) 2 % shampoo    Encounter for hepatitis C screening test for low risk patient        Relevant Orders    HEPATITIS C ANTIBODY            Plan:     Well adult exam  -     CBC Auto Differential; Future; Expected date: 03/10/2023    FH: colon cancer in first degree relative <60 years old    FH: thyroid cancer  -      Thyroid; Future; Expected date: 03/10/2023  -     TSH; Future; Expected date: 03/10/2023    Gastroesophageal reflux disease without esophagitis  -     omeprazole (PRILOSEC) 40 MG capsule; Take 1 capsule (40 mg total) by mouth once daily.  Dispense: 90 capsule; Refill: 3    Migraine with aura and without status migrainosus, not intractable  -     amitriptyline (ELAVIL) 10 MG tablet; Take 1 tablet (10 mg total) by mouth nightly as needed for Insomnia.  Dispense: 90 tablet; Refill: 3    Other hyperlipidemia  -     Lipid Panel; Future; Expected date: 03/10/2023  -     Comprehensive Metabolic Panel; Future; Expected date: 03/10/2023    Vitamin D deficiency disease  -     Vitamin D; Future; Expected date: 03/10/2023    Osteopenia of multiple sites    Menopausal disorder  -     amitriptyline (ELAVIL) 10 MG tablet; Take 1 tablet (10 mg total) by mouth nightly as needed for Insomnia.  Dispense: 90 tablet; Refill: 3    Scalp pruritus  -     clobetasoL (TEMOVATE) 0.05 %  external solution; Apply to affected areas of scalp 1-2 times daily as needed for itch  Dispense: 50 mL; Refill: 3    Seborrheic dermatitis  -     ketoconazole (NIZORAL) 2 % shampoo; Wash hair with medicated shampoo at least 1x/week - let sit on scalp at least 5 minutes prior to rinsing  Dispense: 120 mL; Refill: 5    Encounter for hepatitis C screening test for low risk patient  -     HEPATITIS C ANTIBODY; Future; Expected date: 03/10/2023    Other orders  -     estradioL (ESTRACE) 0.01 % (0.1 mg/gram) vaginal cream; Place 1 g vaginally twice a week.  -     ergocalciferol (VITAMIN D2) 50,000 unit Cap; Take 1 capsule (50,000 Units total) by mouth every 7 days.  Dispense: 12 capsule; Refill: 3         TMJ care discussed with Pt. Follow up with dentist. Maintain baseline meds. HMI discussed. Await labs. Follow up in 6mo with repeat labs. Will send note to GI to see if 10 year colonoscopy still holds or should she be at 5 years screening.     Scribe Attestation:   I, Isaac Ugalde, am scribing for, and in the presence of, Dr. Tom Hendricks Jr. I performed the above scribed service and the documentation accurately describes the services I performed. I attest to the accuracy of the note.    I, Dr. Tom Hendricks Jr, reviewed documentation as scribed above. I personally performed the services described in this documentation.  I agree that the record reflects my personal performance and is accurate and complete. Tom Hendricks Jr., MD.  03/10/2023

## 2023-03-11 LAB
ALBUMIN SERPL-MCNC: 4.6 G/DL (ref 3.8–4.9)
ALBUMIN/GLOB SERPL: 2.1 {RATIO} (ref 1.2–2.2)
ALP SERPL-CCNC: 56 IU/L (ref 44–121)
ALT SERPL-CCNC: 8 IU/L (ref 0–32)
AST SERPL-CCNC: 15 IU/L (ref 0–40)
BASOPHILS # BLD AUTO: 0 X10E3/UL (ref 0–0.2)
BASOPHILS NFR BLD AUTO: 1 %
BILIRUB SERPL-MCNC: 0.7 MG/DL (ref 0–1.2)
BUN SERPL-MCNC: 14 MG/DL (ref 6–24)
BUN/CREAT SERPL: 17 (ref 9–23)
CALCIUM SERPL-MCNC: 9.3 MG/DL (ref 8.7–10.2)
CHLORIDE SERPL-SCNC: 105 MMOL/L (ref 96–106)
CHOLEST SERPL-MCNC: 213 MG/DL (ref 100–199)
CO2 SERPL-SCNC: 25 MMOL/L (ref 20–29)
CREAT SERPL-MCNC: 0.82 MG/DL (ref 0.57–1)
EOSINOPHIL # BLD AUTO: 0.1 X10E3/UL (ref 0–0.4)
EOSINOPHIL NFR BLD AUTO: 2 %
ERYTHROCYTE [DISTWIDTH] IN BLOOD BY AUTOMATED COUNT: 13 % (ref 11.7–15.4)
EST. GFR  (NO RACE VARIABLE): 86 ML/MIN/1.73
GLOBULIN SER CALC-MCNC: 2.2 G/DL (ref 1.5–4.5)
GLUCOSE SERPL-MCNC: 85 MG/DL (ref 70–99)
HCT VFR BLD AUTO: 38.3 % (ref 34–46.6)
HDLC SERPL-MCNC: 71 MG/DL
HGB BLD-MCNC: 12.3 G/DL (ref 11.1–15.9)
IMM GRANULOCYTES # BLD AUTO: 0 X10E3/UL (ref 0–0.1)
IMM GRANULOCYTES NFR BLD AUTO: 0 %
LDLC SERPL CALC-MCNC: 132 MG/DL (ref 0–99)
LYMPHOCYTES # BLD AUTO: 1.3 X10E3/UL (ref 0.7–3.1)
LYMPHOCYTES NFR BLD AUTO: 27 %
MCH RBC QN AUTO: 26.8 PG (ref 26.6–33)
MCHC RBC AUTO-ENTMCNC: 32.1 G/DL (ref 31.5–35.7)
MCV RBC AUTO: 83 FL (ref 79–97)
MONOCYTES # BLD AUTO: 0.3 X10E3/UL (ref 0.1–0.9)
MONOCYTES NFR BLD AUTO: 7 %
NEUTROPHILS # BLD AUTO: 3.1 X10E3/UL (ref 1.4–7)
NEUTROPHILS NFR BLD AUTO: 63 %
PLATELET # BLD AUTO: 266 X10E3/UL (ref 150–450)
POTASSIUM SERPL-SCNC: 4.3 MMOL/L (ref 3.5–5.2)
PROT SERPL-MCNC: 6.8 G/DL (ref 6–8.5)
RBC # BLD AUTO: 4.59 X10E6/UL (ref 3.77–5.28)
SODIUM SERPL-SCNC: 142 MMOL/L (ref 134–144)
TRIGL SERPL-MCNC: 57 MG/DL (ref 0–149)
TSH SERPL DL<=0.005 MIU/L-ACNC: 1.36 UIU/ML (ref 0.45–4.5)
VLDLC SERPL CALC-MCNC: 10 MG/DL (ref 5–40)
WBC # BLD AUTO: 4.8 X10E3/UL (ref 3.4–10.8)

## 2023-03-20 ENCOUNTER — HOSPITAL ENCOUNTER (OUTPATIENT)
Dept: RADIOLOGY | Facility: HOSPITAL | Age: 53
Discharge: HOME | End: 2023-03-20
Attending: PEDIATRICS
Payer: COMMERCIAL

## 2023-03-20 DIAGNOSIS — Z80.8 FH: THYROID CANCER: ICD-10-CM

## 2023-03-20 PROCEDURE — 76536 US EXAM OF HEAD AND NECK: CPT | Mod: TC

## 2023-03-20 PROCEDURE — 76536 US EXAM OF HEAD AND NECK: CPT | Mod: 26,,, | Performed by: RADIOLOGY

## 2023-03-20 PROCEDURE — 76536 US THYROID: ICD-10-PCS | Mod: 26,,, | Performed by: RADIOLOGY

## 2023-04-04 ENCOUNTER — PATIENT MESSAGE (OUTPATIENT)
Dept: INTERNAL MEDICINE | Facility: CLINIC | Age: 53
End: 2023-04-04
Payer: COMMERCIAL

## 2023-04-04 RX ORDER — IBANDRONATE SODIUM 150 MG/1
150 TABLET, FILM COATED ORAL
COMMUNITY
Start: 2023-02-02

## 2023-09-18 ENCOUNTER — TELEPHONE (OUTPATIENT)
Dept: INTERNAL MEDICINE | Facility: CLINIC | Age: 53
End: 2023-09-18
Payer: COMMERCIAL

## 2023-09-18 NOTE — TELEPHONE ENCOUNTER
----- Message from Elen Bolanos MA sent at 9/15/2023  3:54 PM CDT -----  Regarding: FW: pt call back  Did you send labs for pt. Please advise.   ----- Message -----  From: Leilani Malave  Sent: 9/15/2023   1:19 PM CDT  To: Ruthie Hutchins Jr Staff  Subject: pt call back                                     Name of Who is Calling:        ESTHER YEE [[id number]]        What is the request in detail: pt called to see if her orders were sent to [facility]           Can the clinic reply by [facility]: no           What Number to Call Back if not in [facility]: ) [phone]

## 2023-09-23 LAB
25(OH)D3+25(OH)D2 SERPL-MCNC: 52.3 NG/ML (ref 30–100)
HCV IGG SERPL QL IA: NON REACTIVE

## 2023-09-25 ENCOUNTER — OFFICE VISIT (OUTPATIENT)
Dept: INTERNAL MEDICINE | Facility: CLINIC | Age: 53
End: 2023-09-25
Payer: COMMERCIAL

## 2023-09-25 VITALS
SYSTOLIC BLOOD PRESSURE: 114 MMHG | HEIGHT: 65 IN | RESPIRATION RATE: 17 BRPM | BODY MASS INDEX: 19.07 KG/M2 | WEIGHT: 114.44 LBS | DIASTOLIC BLOOD PRESSURE: 72 MMHG | OXYGEN SATURATION: 99 % | TEMPERATURE: 96 F | HEART RATE: 65 BPM

## 2023-09-25 DIAGNOSIS — Z80.0 FH: COLON CANCER IN FIRST DEGREE RELATIVE <60 YEARS OLD: ICD-10-CM

## 2023-09-25 DIAGNOSIS — M85.89 OSTEOPENIA OF MULTIPLE SITES: ICD-10-CM

## 2023-09-25 DIAGNOSIS — Z80.8 FH: THYROID CANCER: ICD-10-CM

## 2023-09-25 DIAGNOSIS — E55.9 VITAMIN D DEFICIENCY DISEASE: ICD-10-CM

## 2023-09-25 DIAGNOSIS — S46.912A STRAIN OF LEFT SHOULDER, INITIAL ENCOUNTER: ICD-10-CM

## 2023-09-25 DIAGNOSIS — K29.90 GASTRITIS AND DUODENITIS: ICD-10-CM

## 2023-09-25 DIAGNOSIS — G43.109 MIGRAINE WITH AURA AND WITHOUT STATUS MIGRAINOSUS, NOT INTRACTABLE: ICD-10-CM

## 2023-09-25 DIAGNOSIS — Z13.0 SCREENING FOR DEFICIENCY ANEMIA: ICD-10-CM

## 2023-09-25 DIAGNOSIS — E78.49 OTHER HYPERLIPIDEMIA: Primary | ICD-10-CM

## 2023-09-25 DIAGNOSIS — K21.9 GASTROESOPHAGEAL REFLUX DISEASE WITHOUT ESOPHAGITIS: ICD-10-CM

## 2023-09-25 PROCEDURE — 90471 FLU VACCINE (QUAD) GREATER THAN OR EQUAL TO 3YO PRESERVATIVE FREE IM: ICD-10-PCS | Mod: S$GLB,,, | Performed by: PEDIATRICS

## 2023-09-25 PROCEDURE — 3074F SYST BP LT 130 MM HG: CPT | Mod: CPTII,S$GLB,, | Performed by: PEDIATRICS

## 2023-09-25 PROCEDURE — 99999 PR PBB SHADOW E&M-EST. PATIENT-LVL V: CPT | Mod: PBBFAC,,, | Performed by: PEDIATRICS

## 2023-09-25 PROCEDURE — 3008F PR BODY MASS INDEX (BMI) DOCUMENTED: ICD-10-PCS | Mod: CPTII,S$GLB,, | Performed by: PEDIATRICS

## 2023-09-25 PROCEDURE — 3074F PR MOST RECENT SYSTOLIC BLOOD PRESSURE < 130 MM HG: ICD-10-PCS | Mod: CPTII,S$GLB,, | Performed by: PEDIATRICS

## 2023-09-25 PROCEDURE — 99214 OFFICE O/P EST MOD 30 MIN: CPT | Mod: 25,S$GLB,, | Performed by: PEDIATRICS

## 2023-09-25 PROCEDURE — 90686 IIV4 VACC NO PRSV 0.5 ML IM: CPT | Mod: S$GLB,,, | Performed by: PEDIATRICS

## 2023-09-25 PROCEDURE — 1160F RVW MEDS BY RX/DR IN RCRD: CPT | Mod: CPTII,S$GLB,, | Performed by: PEDIATRICS

## 2023-09-25 PROCEDURE — 3078F PR MOST RECENT DIASTOLIC BLOOD PRESSURE < 80 MM HG: ICD-10-PCS | Mod: CPTII,S$GLB,, | Performed by: PEDIATRICS

## 2023-09-25 PROCEDURE — 90471 IMMUNIZATION ADMIN: CPT | Mod: S$GLB,,, | Performed by: PEDIATRICS

## 2023-09-25 PROCEDURE — 99999 PR PBB SHADOW E&M-EST. PATIENT-LVL V: ICD-10-PCS | Mod: PBBFAC,,, | Performed by: PEDIATRICS

## 2023-09-25 PROCEDURE — 90686 FLU VACCINE (QUAD) GREATER THAN OR EQUAL TO 3YO PRESERVATIVE FREE IM: ICD-10-PCS | Mod: S$GLB,,, | Performed by: PEDIATRICS

## 2023-09-25 PROCEDURE — 1159F MED LIST DOCD IN RCRD: CPT | Mod: CPTII,S$GLB,, | Performed by: PEDIATRICS

## 2023-09-25 PROCEDURE — 1160F PR REVIEW ALL MEDS BY PRESCRIBER/CLIN PHARMACIST DOCUMENTED: ICD-10-PCS | Mod: CPTII,S$GLB,, | Performed by: PEDIATRICS

## 2023-09-25 PROCEDURE — 3078F DIAST BP <80 MM HG: CPT | Mod: CPTII,S$GLB,, | Performed by: PEDIATRICS

## 2023-09-25 PROCEDURE — 99214 PR OFFICE/OUTPT VISIT, EST, LEVL IV, 30-39 MIN: ICD-10-PCS | Mod: 25,S$GLB,, | Performed by: PEDIATRICS

## 2023-09-25 PROCEDURE — 3008F BODY MASS INDEX DOCD: CPT | Mod: CPTII,S$GLB,, | Performed by: PEDIATRICS

## 2023-09-25 PROCEDURE — 1159F PR MEDICATION LIST DOCUMENTED IN MEDICAL RECORD: ICD-10-PCS | Mod: CPTII,S$GLB,, | Performed by: PEDIATRICS

## 2023-09-25 NOTE — PROGRESS NOTES
Patient ID: Alexandra Rivas is a 53 y.o. female.     Chief Complaint: Follow-up     Here for follow-up        PMHx, PSHx, SocHx, and FHx reviewed and discussed with patient.      1)Migraine with aura: quiet on elavil only as needed. Improvement after menopause      2)FH: thyroid cancer: regular checking of TSH and US     3)FH: colon cancer: sister Dx with colon cancer at 51yo. Colonoscopy 5/22 que 10 years.     4)Vitamin D: on weekly D     5)Myofascial pain tender lower left shoulder     6)Gastritis/duodenitis/GERD: quiet on Prilosec      7)LIPIDS: following D&E     Labs: Vitamin D reviewed with Patient. Hep C negative.        Review of Systems   Constitutional:  Negative for chills, diaphoresis, fatigue and fever.   Respiratory:  Negative for cough and shortness of breath.    Cardiovascular:  Negative for chest pain.   Gastrointestinal:  Negative for abdominal pain, anal bleeding, constipation, diarrhea, nausea and vomiting.   Endocrine: Negative for cold intolerance, heat intolerance, polydipsia, polyphagia and polyuria.   Musculoskeletal:  Positive for arthralgias, back pain (Upper left) and myalgias.   Neurological:  Negative for headaches (Migraines quiet).   All other systems reviewed and are negative.                 Objective   Physical Exam  Constitutional:       General: She is not in acute distress.     Appearance: Normal appearance. She is not ill-appearing or toxic-appearing.   Cardiovascular:      Rate and Rhythm: Normal rate and regular rhythm.      Pulses: Normal pulses.      Heart sounds: Normal heart sounds. No murmur heard.     No friction rub.   Pulmonary:      Effort: Pulmonary effort is normal.      Breath sounds: Normal breath sounds.   Abdominal:      General: There is no distension.      Palpations: There is no mass.      Tenderness: There is no abdominal tenderness. There is no guarding or rebound.      Hernia: No hernia is present.   Musculoskeletal:      Left shoulder: Tenderness  (infraspinatous and rhomboid. Range of motion good) present.   Neurological:      Mental Status: She is alert and oriented to person, place, and time.   Psychiatric:         Mood and Affect: Mood normal.         Behavior: Behavior normal.         Thought Content: Thought content normal.         Judgment: Judgment normal.                     Assessment and Plan   1. Other hyperlipidemia  -     Lipid Panel; Future; Expected date: 09/25/2023  -     Comprehensive Metabolic Panel; Future; Expected date: 09/25/2023     2. Vitamin D deficiency disease  -     Vitamin D; Future; Expected date: 09/25/2023     3. Osteopenia of multiple sites     4. Migraine with aura and without status migrainosus, not intractable     5. Gastroesophageal reflux disease without esophagitis     6. Gastritis and duodenitis     7. FH: colon cancer in first degree relative <60 years old  Overview:  Sister at 49 yo.        8. Strain of left shoulder, initial encounter  -     Ambulatory referral/consult to Physical/Occupational Therapy; Future; Expected date: 10/02/2023     9. FH: thyroid cancer  -     TSH; Future; Expected date: 09/25/2023  -     US Soft Tissue Head Neck Thyroid; Future; Expected date: 09/25/2023     10. Screening for deficiency anemia  -     CBC Auto Differential; Future; Expected date: 09/25/2023     Other orders  -     Influenza - Quadrivalent (PF)           Overall stable doing well. If PT doesn't help refer to Ortho. Maintain medications. Vaccine discussed. Follow up 6 months with labs then yearly is stable.              Follow up in about 6 months (around 3/25/2024).

## 2023-10-18 ENCOUNTER — CLINICAL SUPPORT (OUTPATIENT)
Dept: REHABILITATION | Facility: HOSPITAL | Age: 53
End: 2023-10-18
Attending: PEDIATRICS
Payer: COMMERCIAL

## 2023-10-18 DIAGNOSIS — M25.512 CHRONIC LEFT SHOULDER PAIN: ICD-10-CM

## 2023-10-18 DIAGNOSIS — R29.898 DECREASED STRENGTH OF UPPER EXTREMITY: ICD-10-CM

## 2023-10-18 DIAGNOSIS — G89.29 CHRONIC LEFT SHOULDER PAIN: ICD-10-CM

## 2023-10-18 DIAGNOSIS — R68.89 DECREASED FUNCTIONAL ACTIVITY TOLERANCE: ICD-10-CM

## 2023-10-18 DIAGNOSIS — S46.912A STRAIN OF LEFT SHOULDER, INITIAL ENCOUNTER: ICD-10-CM

## 2023-10-18 PROCEDURE — 97112 NEUROMUSCULAR REEDUCATION: CPT

## 2023-10-18 PROCEDURE — 97162 PT EVAL MOD COMPLEX 30 MIN: CPT

## 2023-10-18 NOTE — PLAN OF CARE
[facility]   Physical Therapy Initial Evaluation      Date: 10/18/2023   Name: Alexandra [facility] Number: [id number]    Therapy Diagnosis:    Encounter Diagnoses   Name Primary?    Strain of left shoulder, initial encounter     Chronic left shoulder pain     Decreased strength of upper extremity     Decreased functional activity tolerance       Physician: Tom Hendricks MD     Physician Orders: PT Eval and Treat  Medical Diagnosis from Referral: Strain of left shoulder, initial encounter [S46.912A]  Evaluation Date: 10/18/2023  Authorization Period Expiration: 9/24/2024  Plan of Care Expiration: 1/10/2024  Progress Note Due: 11/15/2024  Visit # / Visits authorized: 1/1   FOTO: 1/3 (last performed on 10/18/2023)    Precautions: Standard    Time In: 8:00 AM  Time Out: 8:55 AM  Total Billable Time (timed & untimed codes): 50 minutes    Subjective     Date of onset: 6 months ago     History of current condition - Alexandra reports that she is having pain that starts below her left shoulder blade and goes down her left side of her back near her ribs.     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   Current Level of Function: Independent with all ADL, IADL, community mobility and functional activities with reports of increased pain and need for increased time and frequent breaks.      Dominant Extremity:    [x] Right     "[] Left    Pts goals: Pt reported goals are to decrease overall pain levels in order to return to prior functional level.     Medical History:   Past Medical History:   Diagnosis Date    Allergic rhinitis     FH: thyroid cancer     Gastritis and duodenitis 4/19/2016    Headache, classical migraine     occasional aura "every now and then"    Vitamin D deficiency disease        Surgical History:   Alexandra Rivas  has a past surgical history that includes Tubal ligation; Tonsillectomy; and Colonoscopy (N/A, 5/12/2022).    Medications:   Alexandra has a current medication list which includes the following prescription(s): amitriptyline, clobetasol, ergocalciferol, estradiol, ibandronate, ketoconazole, and omeprazole.    Allergies:   Review of patient's allergies indicates:   Allergen Reactions    Oxycodone-acetaminophen Itching        Objective        RANGE OF MOTION:   Cervical Right   (spine) Left    Pain/Dysfunction with Movement Goal   Cervical Flexion (60º) 50 ---  60   Cervical Extension (80º) 65 ---  80   Cervical Side Bending (45º) 45 45  -   Cervical Rotation (75º) 80 80         -       Shoulder AROM/PROM Right Left Pain/Dysfunction with Movement Goal   Shoulder Flexion (180º) 172 170 Pain with overhead motion in mid Tspine on Left side  -   Shoulder Abduction (180º) 170 168  -   Shoulder Extension (60º) 50 50  -   Shoulder ER  at 90º (90º) 102 100 Pain in same spot on L with full ER -   Shoulder IR at 90º (70º) 70 68  -   Functional ER Superior angle of opposite scapula Superior angle of opposite scapula  -   Functional IR Inferior angle of opposite scapula Inferior angle of opposite scapula  -    **Plan to further assess Cervical and thoracic spine mobility next visit       STRENGTH:   U/E MMT Right Left Pain/Dysfunction with Movement Goal   Shoulder Flexion 4/5 4/5  5/5 B   Shoulder Extension 4/5 4/5  5/5 B   Shoulder Abduction 4/5 4/5  5/5 B   Shoulder IR 4+/5 4+/5  5/5 B   Shoulder ER 4/5 4-/5  5/5 " B   Serratus Anterior 4/5 4/5  5/5 B   Middle Trapezius 3+/5 3+/5 Reproduction of symptoms 5/5 B   Lower Trapezius 3+/5 3+/5 Reproduction of symptoms 5/5 B   Elbow Flexion  5/5 5/5  5/5 B   Elbow Extension 5/5 5/5  5/5 B   Wrist Flexion 5/5 4-/5  5/5 B   Wrist Extension 5/5 4/5  5/5 B         JOINT MOBILITY:    Joint Motion  Right Mobility  (spine) Left Mobility Goal   Thoracic PA  [x] Hypo     [] Normal     [] Hyper --- Normal   Glenohumeral distraction [] Hypo     [x] Normal     [] Hyper [] Hypo     [x] Normal     [] Hyper Normal    Glenohumeral inferior  [] Hypo     [x] Normal     [] Hyper [] Hypo     [x] Normal     [] Hyper Normal    Glenohumeral anterior [] Hypo     [x] Normal     [] Hyper [] Hypo     [x] Normal     [] Hyper Normal    Glenohumeral posterior [] Hypo     [x] Normal     [] Hyper [] Hypo     [x] Normal     [] Hyper Normal          SPECIAL TESTS:    Right  (spine) Left  Goal   Cervical Distraction [] Positive    [x] Negative [] Positive    [x] Negative Negative B    Cervical Compression  [] Positive    [x] Negative [] Positive    [x] Negative Negative B         Right  (spine) Left  Goal   Anterior Apprehension [] Positive    [x] Negative [] Positive    [x] Negative Negative B    Posterior Apprehension  [] Positive    [x] Negative [] Positive    [x] Negative Negative B    Whitfield Daniel  [] Positive    [x] Negative [] Positive    [x] Negative Negative B    Empty Can  [] Positive    [x] Negative [] Positive    [x] Negative Negative B    Horizontal Adduction Test  [] Positive    [x] Negative [] Positive    [x] Negative Negative B   Median ULTT  [] Positive    [x] Negative [x] Positive    [] Negative Negative B    Ulnar ULTT [] Positive    [x] Negative [x] Positive    [] Negative Negative B    Radial ULTT [] Positive    [x] Negative [] Positive    [x] Negative Negative B           SENSATION  [x] Intact to Light Touch   [] Impaired:      PALPATION: Muscles: Increased tone and tenderness to palpation of:  left rhomboids , Lower trap and thoracic paraspinals , . Structures: Increased tenderness to palpation of: left medial border of the scapula       POSTURE:  Pt presents with postural abnormalities which include:    [x] Forward Head   [] Increased Lumbar Lordosis   [x] Rounded Shoulder   [] Genu Recurvatum   [] Increased Thoracic Kyphosis [] Genu Valgus   [] Trunk Deviated    [] Pes Planus   [] Scapular Winging    [] Other:         FUNCTIONAL MOVEMENT PATTERNS  Movement  Analysis Notes    Repeated Shoulder Flexion []Functional  [x]Dysfunctional:  [x]Painful  []Non-Painful       Plank  []Functional  []Dysfunctional:  []Painful  []Non-Painful    Not performed   Push Up  []Functional  []Dysfunctional:  []Painful  []Non-Painful    Not performed         Function:     Intake Outcome Measure for FOTO Shoulder Survey    Therapist reviewed FOTO scores for Alexandra on 10/18/2023.   FOTO report - see Media section or FOTO account for episode details    Intake Score: 60%          Treatment     Total Treatment time (time-based codes) separate from Evaluation: (10) minutes     Alexandra received the treatments listed below:          NEUROMUSCULAR RE-EDUCATION ACTIVITIES to improve Balance, Coordination, Kinesthetic, Sense, Proprioception, and Posture for (10) minutes.  The following were included:    Intervention Performed Today    HEP handout education and walkthrough x  See patient instructions                                        Plan for Next Visit: Assess T spine, Assess more cervical spine, periscapular training, Prone Y/T, D2 flexion, Shoulder ER, Series six        Patient Education and Home Exercises     Education provided: (included in treatment time) minutes  PURPOSE: Patient educated on the impairments noted above and the effects of physical therapy intervention to improve overall condition and QOL.   EXERCISE: Patient was educated on all the above exercise prior/during/after for proper posture, positioning, and  execution for safe performance with home exercise program.   STRENGTH: Patient educated on the importance of improved core and extremity strength in order to improve alignment of the spine and extremities with static positions and dynamic movement.   POSTURE: Patient educated on postural awareness to reduce stress and maintain optimal alignment of the spine with static positions and dynamic movement   ERGONOMICS: Patient educated on proper ergonomics at the work station in order to maintain optimal alignment of the musculoskeletal system and improve efficiency in the work environment.    Written Home Exercises Provided: yes.  Exercises were reviewed and Alexandra was able to demonstrate them prior to the end of the session.  Alexandra demonstrated good  understanding of the education provided. See EMR under Patient Instructions for exercises provided during therapy sessions.    Assessment     Alexandra is a 53 y.o. female referred to outpatient Physical Therapy with a medical diagnosis of Strain of left shoulder, initial encounter [S46.912A]. Pt presents with impairments in the following categories: IMPAIRMENTS: ROM, strength, pain, posture, functional movement patterns, and neural tension. Patient currently is limited in her participation of her normal ADL's and work activities due to pain and discomfort.     Pt prognosis is Good  Pt will benefit from skilled outpatient Physical Therapy to address the deficits stated above and in the chart below, provide pt/family education, and to maximize pt's level of independence.     Plan of care discussed with patient: Yes  Pt's spiritual, cultural and educational needs considered and patient is agreeable to the plan of care and goals as stated below:     Anticipated Barriers for therapy: occupation    Medical Necessity is demonstrated by the following  History  Co-morbidities and personal factors that may impact the plan of care [] LOW: no personal factors / co-morbidities  [x]  "MODERATE: 1-2 personal factors / co-morbidities  [] HIGH: 3+ personal factors / co-morbidities    Moderate / High Support Documentation:   Past Medical History:   Diagnosis Date    Allergic rhinitis     FH: thyroid cancer     Gastritis and duodenitis 4/19/2016    Headache, classical migraine     occasional aura "every now and then"    Vitamin D deficiency disease         Examination  Body Structures and Functions, activity limitations and participation restrictions that may impact the plan of care [] LOW: addressing 1-2 elements  [x] MODERATE: 3+ elements  [] HIGH: 4+ elements (please support below)    Moderate / High Support Documentation: See above in "Current Level of Function"      Clinical Presentation [] LOW: stable  [x] MODERATE: Evolving  [] HIGH: Unstable     Decision Making/ Complexity Score: moderate         Short Term Goals:  6 weeks Status  Date Met   PAIN: Pt will report worst pain of 6/10 in order to progress toward max functional ability and improve quality of life. [] Progressing  [] Met  [] Not Met    FUNCTION: Patient will demonstrate improved function as indicated by a score of greater than or equal to 65 out of 100 on FOTO. [] Progressing  [] Met  [] Not Met    MOBILITY: Patient will improve AROM to 50% of stated goals, listed in objective measures above, in order to progress towards independence with functional activities.  [] Progressing  [] Met  [] Not Met    STRENGTH: Patient will improve strength to 50% of stated goals, listed in objective measures above, in order to progress towards independence with functional activities. [] Progressing  [] Met  [] Not Met    POSTURE: Patient will correct postural deviations in sitting and standing, to decrease pain and promote long term stability.  [] Progressing  [] Met  [] Not Met    HEP: Patient will demonstrate independence with HEP in order to progress toward functional independence. [] Progressing  [] Met  [] Not Met      Long Term Goals:  12 weeks " Status Date Met   PAIN: Pt will report worst pain of 2/10 in order to progress toward max functional ability and improve quality of life [] Progressing  [] Met  [] Not Met    FUNCTION: Patient will demonstrate improved function as indicated by a score of greater than or equal to 69 out of 100 on FOTO. [] Progressing  [] Met  [] Not Met    MOBILITY: Patient will improve AROM to stated goals, listed in objective measures above, in order to return to maximal functional potential and improve quality of life. [] Progressing  [] Met  [] Not Met    STRENGTH: Patient will improve strength to stated goals, listed in objective measures above, in order to improve functional independence and quality of life.  [] Progressing  [] Met  [] Not Met    GAIT: Patient will demonstrate normalized gait mechanics with minimal compensation in order to return to PLOF. [] Progressing  [] Met  [] Not Met    Patient will return to normal ADL's, IADL's, community involvement, recreational activities, and work-related activities with less than or equal to 2/10 pain and maximal function.  [] Progressing  [] Met  [] Not Met     [] Progressing  [] Met  [] Not Met      Plan     Plan of care Certification: 10/18/2023 to 1/10/2024.    Outpatient Physical Therapy 3 times weekly for 12 weeks to include any combination of the following interventions: virtual visits, dry needling, modalities, electrical stimulation (IFC, Pre-Mod, Attended with Functional Dry Needling), Cervical/Lumbar Traction, Gait Training, Manual Therapy, Neuromuscular Re-ed, Patient Education, Self Care, Therapeutic Exercise, and Therapeutic Activites     Shiraz Gleason, PT, DPT

## 2023-10-23 ENCOUNTER — CLINICAL SUPPORT (OUTPATIENT)
Dept: REHABILITATION | Facility: HOSPITAL | Age: 53
End: 2023-10-23
Payer: COMMERCIAL

## 2023-10-23 DIAGNOSIS — G89.29 CHRONIC LEFT SHOULDER PAIN: Primary | ICD-10-CM

## 2023-10-23 DIAGNOSIS — M25.512 CHRONIC LEFT SHOULDER PAIN: Primary | ICD-10-CM

## 2023-10-23 DIAGNOSIS — R68.89 DECREASED FUNCTIONAL ACTIVITY TOLERANCE: ICD-10-CM

## 2023-10-23 DIAGNOSIS — R29.898 DECREASED STRENGTH OF UPPER EXTREMITY: ICD-10-CM

## 2023-10-23 PROCEDURE — 97110 THERAPEUTIC EXERCISES: CPT | Mod: CQ

## 2023-10-23 PROCEDURE — 97112 NEUROMUSCULAR REEDUCATION: CPT | Mod: CQ

## 2023-10-23 PROCEDURE — 97140 MANUAL THERAPY 1/> REGIONS: CPT | Mod: CQ

## 2023-10-23 NOTE — PROGRESS NOTES
[facility]   Physical Therapy Treatment Note        Name: Alexandra [facility] Number: [id number]    Therapy Diagnosis:   Encounter Diagnoses   Name Primary?    Chronic left shoulder pain Yes    Decreased strength of upper extremity     Decreased functional activity tolerance      Physician: Tom Hendricks MD    Visit Date: 10/23/2023    Physician Orders: PT Eval and Treat  Medical Diagnosis from Referral: Strain of left shoulder, initial encounter [S46.912A]  Evaluation Date: 10/18/2023  Authorization Period Expiration: 9/24/2024  Plan of Care Expiration: 1/10/2024  Progress Note Due: 11/15/2024  Visit # / Visits authorized: 1/25 (+ evaluation)  FOTO: 1/3 (last performed on 10/18/2023)     Precautions: Standard  PTA Visit #: 1/5     Time In: 0755  Time Out: 0840  Total Billable Time: 45 minutes (Billing reflects 1 on 1 treatment time spent with patient)    Subjective     Patient reports: Sore this morning after performing some home exercise program exercises.    She was compliant with home exercise program.    Response to previous treatment: sore after evaluation    Functional change: symptoms noted with job duties    Pain: 7/10     Location: left lower angle of scapula    Objective      Objective Measures updated at progress report or POC update only unless otherwise noted.       Treatment     Alexandra received the treatments listed below:         MANUAL THERAPY TECHNIQUES were applied for (8) minutes, including:    Manual Intervention Performed Today    Soft Tissue Mobilization [x] left upper trapezius, periscapular musculature, rhomboids , subscapularis     Joint Mobilizations []     []     []    Functional Dry Needling  []      Plan for Next Visit: Continue as needed       THERAPEUTIC EXERCISES to develop strength, endurance, ROM, flexibility, posture, and core stabilization for (18) minutes including:    Intervention Performed Today    UBE  x 3 minutes  forward/backward    Scapula retraction x 3 x 10 yellow band   Open books                                Plan for Next Visit:        NEUROMUSCULAR RE-EDUCATION ACTIVITIES to improve Balance, Coordination, Kinesthetic, Sense, Proprioception, and Posture for (19) minutes.  The following were included:    Intervention Performed Today    Series 6 X  X  X  X  x X 12 scapula press  X 12 backward circles  X 12 swimmers   X 12 bear hugs  X 12 raise the roof                                        Plan for Next Visit:          Patient Education and Home Exercises       Home Exercises Provided and Patient Education Provided     Education provided: (during session) minutes  PURPOSE: Patient educated on the impairments noted above and the effects of physical therapy intervention to improve overall condition and QUALITY OF LIFE.   EXERCISE: Patient was educated on all the above exercise prior/during/after for proper posture, positioning, and execution for safe performance with home exercise program.   Discussed discussed ergo break with patient and provided suggestions     Written Home Exercises Provided: yes.  Exercises were reviewed and Alexandra was able to demonstrate them prior to the end of the session.  Alexandra demonstrated good  understanding of the education provided. See EMR under Patient Instructions for exercises provided during therapy sessions.    Assessment     Patient came late to this session due to traffic but was able to stay longer. Trigger point noted in her left scapula inferior angle but with the initiation of exercises and through out this session, she reports decreased symptoms.      Alexandra is progressing well towards her goals.   Patient prognosis is Good.     Patient will continue to benefit from skilled outpatient physical therapy to address the deficits listed in the problem list box on initial evaluation, provide pt/family education and to maximize patient's level of independence in the home and  community environment.     Patient's spiritual, cultural and educational needs considered and pt agreeable to plan of care and goals.     Anticipated Barriers for therapy: occupation    Short Term Goals:  6 weeks Status  Date Met   PAIN: Pt will report worst pain of 6/10 in order to progress toward max functional ability and improve quality of life. [] Progressing  [] Met  [] Not Met     FUNCTION: Patient will demonstrate improved function as indicated by a score of greater than or equal to 65 out of 100 on FOTO. [] Progressing  [] Met  [] Not Met     MOBILITY: Patient will improve AROM to 50% of stated goals, listed in objective measures above, in order to progress towards independence with functional activities.  [] Progressing  [] Met  [] Not Met     STRENGTH: Patient will improve strength to 50% of stated goals, listed in objective measures above, in order to progress towards independence with functional activities. [] Progressing  [] Met  [] Not Met     POSTURE: Patient will correct postural deviations in sitting and standing, to decrease pain and promote long term stability.  [] Progressing  [] Met  [] Not Met     HEP: Patient will demonstrate independence with HEP in order to progress toward functional independence. [] Progressing  [] Met  [] Not Met        Long Term Goals:  12 weeks Status Date Met   PAIN: Pt will report worst pain of 2/10 in order to progress toward max functional ability and improve quality of life [] Progressing  [] Met  [] Not Met     FUNCTION: Patient will demonstrate improved function as indicated by a score of greater than or equal to 69 out of 100 on FOTO. [] Progressing  [] Met  [] Not Met     MOBILITY: Patient will improve AROM to stated goals, listed in objective measures above, in order to return to maximal functional potential and improve quality of life. [] Progressing  [] Met  [] Not Met     STRENGTH: Patient will improve strength to stated goals, listed in objective measures  above, in order to improve functional independence and quality of life.  [] Progressing  [] Met  [] Not Met     GAIT: Patient will demonstrate normalized gait mechanics with minimal compensation in order to return to PLOF. [] Progressing  [] Met  [] Not Met     Patient will return to normal ADL's, IADL's, community involvement, recreational activities, and work-related activities with less than or equal to 2/10 pain and maximal function.  [] Progressing  [] Met  [] Not Met       [] Progressing  [] Met  [] Not Met           Plan     Continue Plan of Care (POC) and progress per patient tolerance. See treatment section for details on planned progressions next session.      Jed Taveras, PTA

## 2023-10-25 ENCOUNTER — CLINICAL SUPPORT (OUTPATIENT)
Dept: REHABILITATION | Facility: HOSPITAL | Age: 53
End: 2023-10-25
Payer: COMMERCIAL

## 2023-10-25 DIAGNOSIS — R68.89 DECREASED FUNCTIONAL ACTIVITY TOLERANCE: ICD-10-CM

## 2023-10-25 DIAGNOSIS — G89.29 CHRONIC LEFT SHOULDER PAIN: Primary | ICD-10-CM

## 2023-10-25 DIAGNOSIS — M25.512 CHRONIC LEFT SHOULDER PAIN: Primary | ICD-10-CM

## 2023-10-25 DIAGNOSIS — R29.898 DECREASED STRENGTH OF UPPER EXTREMITY: ICD-10-CM

## 2023-10-25 PROCEDURE — 97110 THERAPEUTIC EXERCISES: CPT

## 2023-10-25 PROCEDURE — 97140 MANUAL THERAPY 1/> REGIONS: CPT

## 2023-10-25 PROCEDURE — 97112 NEUROMUSCULAR REEDUCATION: CPT

## 2023-10-25 NOTE — PROGRESS NOTES
[facility]   Physical Therapy Treatment Note        Name: Alexandra [facility] Number: [id number]    Therapy Diagnosis:   Encounter Diagnoses   Name Primary?    Chronic left shoulder pain Yes    Decreased strength of upper extremity     Decreased functional activity tolerance        Physician: Tom Hendricks MD    Visit Date: 10/25/2023    Physician Orders: PT Eval and Treat  Medical Diagnosis from Referral: Strain of left shoulder, initial encounter [S46.912A]  Evaluation Date: 10/18/2023  Authorization Period Expiration: 9/24/2024  Plan of Care Expiration: 1/10/2024  Progress Note Due: 11/15/2024  Visit # / Visits authorized: 1/25 (+ evaluation)  FOTO: 1/3 (last performed on 10/18/2023)     Precautions: Standard  PTA Visit #: 1/5     Time In: 8:40 AM (Late Arrival)   Time Out: 9:25 AM  Total Billable Time: 40 minutes (Billing reflects 1 on 1 treatment time spent with patient)    Subjective     Patient reports: Having some tenderness below her inferior scapula on the Left side with getting ready.     She was compliant with home exercise program.    Response to previous treatment: sore after evaluation    Functional change: symptoms noted with job duties    Pain: 7/10     Location: left lower angle of scapula    Objective      Objective Measures updated at progress report or POC update only unless otherwise noted.       Treatment     Alexandra received the treatments listed below:         MANUAL THERAPY TECHNIQUES were applied for (10) minutes, including:    Manual Intervention Performed Today    Soft Tissue Mobilization [x] left Periscapular musculature, Rhomboids and paraspinals.    Joint Mobilizations [x] Thoracic PA Grade 3-4     []     []    Functional Dry Needling  []      Plan for Next Visit: Continue as needed       THERAPEUTIC EXERCISES to develop strength, endurance, ROM, flexibility, posture, and core stabilization for (15) minutes including:    Intervention  Performed Today    UBE  x 3 minutes forward/backward    Scapula retraction x 3 x 10 Red band   Open books x  15x on each side                              Plan for Next Visit:        NEUROMUSCULAR RE-EDUCATION ACTIVITIES to improve Balance, Coordination, Kinesthetic, Sense, Proprioception, and Posture for (15) minutes.  The following were included:    Intervention Performed Today    Series 6 X  X  X  X  x X 12 scapula press  X 12 backward circles  X 12 swimmers   X 12 bear hugs  X 12 raise the roof   Prone Ts  x  2x10    Prone Ys                                 Plan for Next Visit:          Patient Education and Home Exercises       Home Exercises Provided and Patient Education Provided     Education provided: (during session) minutes  PURPOSE: Patient educated on the impairments noted above and the effects of physical therapy intervention to improve overall condition and QUALITY OF LIFE.   EXERCISE: Patient was educated on all the above exercise prior/during/after for proper posture, positioning, and execution for safe performance with home exercise program.   Discussed discussed ergo break with patient and provided suggestions     Written Home Exercises Provided: yes.  Exercises were reviewed and Alexandra was able to demonstrate them prior to the end of the session.  Alexandra demonstrated good  understanding of the education provided. See EMR under Patient Instructions for exercises provided during therapy sessions.    Assessment     Pt reported feeling better following manual therapy. She still had some tenderness following the session but reports that it felt better than when she came in. Patient needs verbal and tactile cueing for scapulohumeral coordination with rowing.     Alexandra is progressing well towards her goals.   Patient prognosis is Good.     Patient will continue to benefit from skilled outpatient physical therapy to address the deficits listed in the problem list box on initial evaluation, provide  pt/family education and to maximize patient's level of independence in the home and community environment.     Patient's spiritual, cultural and educational needs considered and pt agreeable to plan of care and goals.     Anticipated Barriers for therapy: occupation    Short Term Goals:  6 weeks Status  Date Met   PAIN: Pt will report worst pain of 6/10 in order to progress toward max functional ability and improve quality of life. [] Progressing  [] Met  [] Not Met     FUNCTION: Patient will demonstrate improved function as indicated by a score of greater than or equal to 65 out of 100 on FOTO. [] Progressing  [] Met  [] Not Met     MOBILITY: Patient will improve AROM to 50% of stated goals, listed in objective measures above, in order to progress towards independence with functional activities.  [] Progressing  [] Met  [] Not Met     STRENGTH: Patient will improve strength to 50% of stated goals, listed in objective measures above, in order to progress towards independence with functional activities. [] Progressing  [] Met  [] Not Met     POSTURE: Patient will correct postural deviations in sitting and standing, to decrease pain and promote long term stability.  [] Progressing  [] Met  [] Not Met     HEP: Patient will demonstrate independence with HEP in order to progress toward functional independence. [] Progressing  [] Met  [] Not Met        Long Term Goals:  12 weeks Status Date Met   PAIN: Pt will report worst pain of 2/10 in order to progress toward max functional ability and improve quality of life [] Progressing  [] Met  [] Not Met     FUNCTION: Patient will demonstrate improved function as indicated by a score of greater than or equal to 69 out of 100 on FOTO. [] Progressing  [] Met  [] Not Met     MOBILITY: Patient will improve AROM to stated goals, listed in objective measures above, in order to return to maximal functional potential and improve quality of life. [] Progressing  [] Met  [] Not Met      STRENGTH: Patient will improve strength to stated goals, listed in objective measures above, in order to improve functional independence and quality of life.  [] Progressing  [] Met  [] Not Met     GAIT: Patient will demonstrate normalized gait mechanics with minimal compensation in order to return to PLOF. [] Progressing  [] Met  [] Not Met     Patient will return to normal ADL's, IADL's, community involvement, recreational activities, and work-related activities with less than or equal to 2/10 pain and maximal function.  [] Progressing  [] Met  [] Not Met           Plan     Continue Plan of Care (POC) and progress per patient tolerance. See treatment section for details on planned progressions next session.      Shiraz Gleason, PT, DPT

## 2023-10-31 ENCOUNTER — CLINICAL SUPPORT (OUTPATIENT)
Dept: REHABILITATION | Facility: HOSPITAL | Age: 53
End: 2023-10-31
Payer: COMMERCIAL

## 2023-10-31 DIAGNOSIS — R68.89 DECREASED FUNCTIONAL ACTIVITY TOLERANCE: ICD-10-CM

## 2023-10-31 DIAGNOSIS — R29.898 DECREASED STRENGTH OF UPPER EXTREMITY: ICD-10-CM

## 2023-10-31 DIAGNOSIS — G89.29 CHRONIC LEFT SHOULDER PAIN: Primary | ICD-10-CM

## 2023-10-31 DIAGNOSIS — M25.512 CHRONIC LEFT SHOULDER PAIN: Primary | ICD-10-CM

## 2023-10-31 PROCEDURE — 97110 THERAPEUTIC EXERCISES: CPT

## 2023-10-31 PROCEDURE — 97112 NEUROMUSCULAR REEDUCATION: CPT

## 2023-10-31 PROCEDURE — 97140 MANUAL THERAPY 1/> REGIONS: CPT

## 2023-10-31 NOTE — PROGRESS NOTES
[facility]   Physical Therapy Treatment Note        Name: Alexandra [facility] Number: [id number]    Therapy Diagnosis:   Encounter Diagnoses   Name Primary?    Chronic left shoulder pain Yes    Decreased strength of upper extremity     Decreased functional activity tolerance        Physician: Tom Hendricks MD    Visit Date: 10/31/2023    Physician Orders: PT Eval and Treat  Medical Diagnosis from Referral: Strain of left shoulder, initial encounter [S46.912A]  Evaluation Date: 10/18/2023  Authorization Period Expiration: 12/31/2023  Plan of Care Expiration: 1/10/2024  Progress Note Due: 11/15/2024  Visit # / Visits authorized: 3/25 (+ evaluation)  FOTO: 1/3 (last performed on 10/18/2023)     Precautions: Standard  PTA Visit #: 1/5     Time In: 9:57 AM   Time Out: 10:45 AM  Total Billable Time: 45 minutes (Billing reflects 1 on 1 treatment time spent with patient)    Subjective     Patient reports: That her muscles feel sore, and she is getting some pain/tension with turning her head to the left.     She was compliant with home exercise program.    Response to previous treatment: sore after evaluation    Functional change: symptoms noted with job duties    Pain: 7/10     Location: left lower angle of scapula    Objective      Objective Measures updated at progress report or POC update only unless otherwise noted.       Treatment     Alexandra received the treatments listed below:         MANUAL THERAPY TECHNIQUES were applied for (9) minutes, including:    Manual Intervention Performed Today    Soft Tissue Mobilization [x] left Periscapular musculature, Rhomboids and paraspinals.    Joint Mobilizations [] Thoracic PA Grade 3-4     []     []    Functional Dry Needling  []      Plan for Next Visit: Continue as needed       THERAPEUTIC EXERCISES to develop strength, endurance, ROM, flexibility, posture, and core stabilization for (23) minutes including:    Intervention  Performed Today    UBE  x 3 minutes forward/backward    Scapula retraction x 3 x 10 Red band (Cables 5#)   Open books x  15x on each side   Shoulder extension x  Cables 5# 2x10   Shoulder ER x  Cables 2.5# 2x10 B                     Plan for Next Visit:        NEUROMUSCULAR RE-EDUCATION ACTIVITIES to improve Balance, Coordination, Kinesthetic, Sense, Proprioception, and Posture for (13) minutes.  The following were included:    Intervention Performed Today    Series 6 X  X  X  X  x X 10 scapula press  X 10 backward circles  X 10 swimmers   X 10 bear hugs  X 10 raise the roof   Prone Ts  x  2x10    Prone Ys  x  2x10                              Plan for Next Visit:          Patient Education and Home Exercises       Home Exercises Provided and Patient Education Provided     Education provided: (during session) minutes  PURPOSE: Patient educated on the impairments noted above and the effects of physical therapy intervention to improve overall condition and QUALITY OF LIFE.   EXERCISE: Patient was educated on all the above exercise prior/during/after for proper posture, positioning, and execution for safe performance with home exercise program.   Discussed discussed ergo break with patient and provided suggestions     Written Home Exercises Provided: yes.  Exercises were reviewed and Alexandra was able to demonstrate them prior to the end of the session.  Marcoskimberly demonstrated good  understanding of the education provided. See EMR under Patient Instructions for exercises provided during therapy sessions.    Assessment     Pt reported feeling better following manual therapy. Patient with significant shoulder fatigue following the addition of shoulder extension, external rotation and prone Ys this session.     Alexandra is progressing well towards her goals.   Patient prognosis is Good.     Patient will continue to benefit from skilled outpatient physical therapy to address the deficits listed in the problem list box on  initial evaluation, provide pt/family education and to maximize patient's level of independence in the home and community environment.     Patient's spiritual, cultural and educational needs considered and pt agreeable to plan of care and goals.     Anticipated Barriers for therapy: occupation    Short Term Goals:  6 weeks Status  Date Met   PAIN: Pt will report worst pain of 6/10 in order to progress toward max functional ability and improve quality of life. [] Progressing  [] Met  [] Not Met     FUNCTION: Patient will demonstrate improved function as indicated by a score of greater than or equal to 65 out of 100 on FOTO. [] Progressing  [] Met  [] Not Met     MOBILITY: Patient will improve AROM to 50% of stated goals, listed in objective measures above, in order to progress towards independence with functional activities.  [] Progressing  [] Met  [] Not Met     STRENGTH: Patient will improve strength to 50% of stated goals, listed in objective measures above, in order to progress towards independence with functional activities. [] Progressing  [] Met  [] Not Met     POSTURE: Patient will correct postural deviations in sitting and standing, to decrease pain and promote long term stability.  [] Progressing  [] Met  [] Not Met     HEP: Patient will demonstrate independence with HEP in order to progress toward functional independence. [] Progressing  [] Met  [] Not Met        Long Term Goals:  12 weeks Status Date Met   PAIN: Pt will report worst pain of 2/10 in order to progress toward max functional ability and improve quality of life [] Progressing  [] Met  [] Not Met     FUNCTION: Patient will demonstrate improved function as indicated by a score of greater than or equal to 69 out of 100 on FOTO. [] Progressing  [] Met  [] Not Met     MOBILITY: Patient will improve AROM to stated goals, listed in objective measures above, in order to return to maximal functional potential and improve quality of life. []  Progressing  [] Met  [] Not Met     STRENGTH: Patient will improve strength to stated goals, listed in objective measures above, in order to improve functional independence and quality of life.  [] Progressing  [] Met  [] Not Met     GAIT: Patient will demonstrate normalized gait mechanics with minimal compensation in order to return to PLOF. [] Progressing  [] Met  [] Not Met     Patient will return to normal ADL's, IADL's, community involvement, recreational activities, and work-related activities with less than or equal to 2/10 pain and maximal function.  [] Progressing  [] Met  [] Not Met           Plan     Continue Plan of Care (POC) and progress per patient tolerance. See treatment section for details on planned progressions next session.      Shiraz Gleason, PT, DPT

## 2023-11-01 ENCOUNTER — CLINICAL SUPPORT (OUTPATIENT)
Dept: REHABILITATION | Facility: HOSPITAL | Age: 53
End: 2023-11-01
Payer: COMMERCIAL

## 2023-11-01 DIAGNOSIS — R68.89 DECREASED FUNCTIONAL ACTIVITY TOLERANCE: ICD-10-CM

## 2023-11-01 DIAGNOSIS — M25.512 CHRONIC LEFT SHOULDER PAIN: Primary | ICD-10-CM

## 2023-11-01 DIAGNOSIS — G89.29 CHRONIC LEFT SHOULDER PAIN: Primary | ICD-10-CM

## 2023-11-01 DIAGNOSIS — R29.898 DECREASED STRENGTH OF UPPER EXTREMITY: ICD-10-CM

## 2023-11-01 PROCEDURE — 97110 THERAPEUTIC EXERCISES: CPT

## 2023-11-01 PROCEDURE — 97112 NEUROMUSCULAR REEDUCATION: CPT

## 2023-11-01 PROCEDURE — 97140 MANUAL THERAPY 1/> REGIONS: CPT

## 2023-11-01 NOTE — PROGRESS NOTES
[facility]   Physical Therapy Treatment Note        Name: Alexandra [facility] Number: [id number]    Therapy Diagnosis:   Encounter Diagnoses   Name Primary?    Chronic left shoulder pain Yes    Decreased strength of upper extremity     Decreased functional activity tolerance          Physician: Tom Hendricks MD    Visit Date: 11/1/2023    Physician Orders: PT Eval and Treat  Medical Diagnosis from Referral: Strain of left shoulder, initial encounter [S46.912A]  Evaluation Date: 10/18/2023  Authorization Period Expiration: 12/31/2023  Plan of Care Expiration: 1/10/2024  Progress Note Due: 11/15/2024  Visit # / Visits authorized: 4/25 (+ evaluation)  FOTO: 1/3 (last performed on 10/18/2023)     Precautions: Standard  PTA Visit #: 1/5     Time In: 7:00 AM   Time Out: 7:47 AM  Total Billable Time: 45 minutes (Billing reflects 1 on 1 treatment time spent with patient)    Subjective     Patient reports: That she is feeling much better this morning compared to yesterday.     She was compliant with home exercise program.    Response to previous treatment: sore after evaluation    Functional change: symptoms noted with job duties    Pain: 3/10     Location: left lower angle of scapula    Objective      Objective Measures updated at progress report or POC update only unless otherwise noted.       Treatment     Alexandra received the treatments listed below:         MANUAL THERAPY TECHNIQUES were applied for (9) minutes, including:    Manual Intervention Performed Today    Soft Tissue Mobilization [x] left Periscapular musculature, Rhomboids and paraspinals.    Joint Mobilizations [x] Thoracic PA Grade 3-4     []     []    Functional Dry Needling  []      Plan for Next Visit: Continue as needed       THERAPEUTIC EXERCISES to develop strength, endurance, ROM, flexibility, posture, and core stabilization for (23) minutes including:    Intervention Performed Today    UBE  x 3 minutes  forward/backward    Rows x 3 x 10 Blue band (Cables 5#)   Open books x  15x on each side   Shoulder extension x   Green band (Cables 5# ) 2x10   Shoulder ER x  Red band (Cables 2.5# ) 2x10 B                     Plan for Next Visit:        NEUROMUSCULAR RE-EDUCATION ACTIVITIES to improve Balance, Coordination, Kinesthetic, Sense, Proprioception, and Posture for (13) minutes.  The following were included:    Intervention Performed Today    Series 6 X  X  X  X  x X 10 scapula press  X 10 backward circles  X 10 swimmers   X 10 bear hugs  X 10 raise the roof   Prone Ts  x  2x10    Prone Ys  x  2x10                              Plan for Next Visit:          Patient Education and Home Exercises       Home Exercises Provided and Patient Education Provided     Education provided: (during session) minutes  PURPOSE: Patient educated on the impairments noted above and the effects of physical therapy intervention to improve overall condition and QUALITY OF LIFE.   EXERCISE: Patient was educated on all the above exercise prior/during/after for proper posture, positioning, and execution for safe performance with home exercise program.   Discussed discussed ergo break with patient and provided suggestions     Written Home Exercises Provided: yes.  Exercises were reviewed and Alexandra was able to demonstrate them prior to the end of the session.  Alexandra demonstrated good  understanding of the education provided. See EMR under Patient Instructions for exercises provided during therapy sessions.    Assessment     Pt had improved tolerance to therapy today with reports of decreased overall pain. She needed varbal and tactile cues for scapulohumeral rhythm with prone T's and Y's    Alexandra is progressing well towards her goals.   Patient prognosis is Good.     Patient will continue to benefit from skilled outpatient physical therapy to address the deficits listed in the problem list box on initial evaluation, provide pt/family education  and to maximize patient's level of independence in the home and community environment.     Patient's spiritual, cultural and educational needs considered and pt agreeable to plan of care and goals.     Anticipated Barriers for therapy: occupation    Short Term Goals:  6 weeks Status  Date Met   PAIN: Pt will report worst pain of 6/10 in order to progress toward max functional ability and improve quality of life. [] Progressing  [] Met  [] Not Met     FUNCTION: Patient will demonstrate improved function as indicated by a score of greater than or equal to 65 out of 100 on FOTO. [] Progressing  [] Met  [] Not Met     MOBILITY: Patient will improve AROM to 50% of stated goals, listed in objective measures above, in order to progress towards independence with functional activities.  [] Progressing  [] Met  [] Not Met     STRENGTH: Patient will improve strength to 50% of stated goals, listed in objective measures above, in order to progress towards independence with functional activities. [] Progressing  [] Met  [] Not Met     POSTURE: Patient will correct postural deviations in sitting and standing, to decrease pain and promote long term stability.  [] Progressing  [] Met  [] Not Met     HEP: Patient will demonstrate independence with HEP in order to progress toward functional independence. [] Progressing  [] Met  [] Not Met        Long Term Goals:  12 weeks Status Date Met   PAIN: Pt will report worst pain of 2/10 in order to progress toward max functional ability and improve quality of life [] Progressing  [] Met  [] Not Met     FUNCTION: Patient will demonstrate improved function as indicated by a score of greater than or equal to 69 out of 100 on FOTO. [] Progressing  [] Met  [] Not Met     MOBILITY: Patient will improve AROM to stated goals, listed in objective measures above, in order to return to maximal functional potential and improve quality of life. [] Progressing  [] Met  [] Not Met     STRENGTH: Patient will  improve strength to stated goals, listed in objective measures above, in order to improve functional independence and quality of life.  [] Progressing  [] Met  [] Not Met     GAIT: Patient will demonstrate normalized gait mechanics with minimal compensation in order to return to PLOF. [] Progressing  [] Met  [] Not Met     Patient will return to normal ADL's, IADL's, community involvement, recreational activities, and work-related activities with less than or equal to 2/10 pain and maximal function.  [] Progressing  [] Met  [] Not Met           Plan     Continue Plan of Care (POC) and progress per patient tolerance. See treatment section for details on planned progressions next session.      Shiraz Gleason, PT, DPT

## 2023-11-02 ENCOUNTER — DOCUMENTATION ONLY (OUTPATIENT)
Dept: REHABILITATION | Facility: HOSPITAL | Age: 53
End: 2023-11-02
Payer: COMMERCIAL

## 2023-11-02 NOTE — PROGRESS NOTES
PT/PTA met face to face to discuss pt's treatment plan and progress towards established goals. Pt will be seen by a physical therapist minimally every 6th visit or every 30 days.        Jed Taveras PTA

## 2023-11-06 ENCOUNTER — CLINICAL SUPPORT (OUTPATIENT)
Dept: REHABILITATION | Facility: HOSPITAL | Age: 53
End: 2023-11-06
Payer: COMMERCIAL

## 2023-11-06 DIAGNOSIS — G89.29 CHRONIC LEFT SHOULDER PAIN: Primary | ICD-10-CM

## 2023-11-06 DIAGNOSIS — M25.512 CHRONIC LEFT SHOULDER PAIN: Primary | ICD-10-CM

## 2023-11-06 DIAGNOSIS — R29.898 DECREASED STRENGTH OF UPPER EXTREMITY: ICD-10-CM

## 2023-11-06 DIAGNOSIS — R68.89 DECREASED FUNCTIONAL ACTIVITY TOLERANCE: ICD-10-CM

## 2023-11-06 PROCEDURE — 97112 NEUROMUSCULAR REEDUCATION: CPT | Mod: CQ

## 2023-11-06 PROCEDURE — 97110 THERAPEUTIC EXERCISES: CPT | Mod: CQ

## 2023-11-06 PROCEDURE — 97140 MANUAL THERAPY 1/> REGIONS: CPT | Mod: CQ

## 2023-11-06 NOTE — PROGRESS NOTES
[facility]   Physical Therapy Treatment Note        Name: Alexandra [facility] Number: [id number]    Therapy Diagnosis:   No diagnosis found.        Physician: Tom Hendricks MD    Visit Date: 11/6/2023    Physician Orders: PT Eval and Treat  Medical Diagnosis from Referral: Strain of left shoulder, initial encounter [S46.912A]  Evaluation Date: 10/18/2023  Authorization Period Expiration: 12/31/2023  Plan of Care Expiration: 1/10/2024  Progress Note Due: 11/15/2024  Visit # / Visits authorized: 5/25 (+ evaluation)  FOTO: 1/3 (last performed on 10/18/2023)     Precautions: Standard  PTA Visit #: 2/5     Time In: 7:53 AM   Time Out: 8:50 AM  Total Billable Time: 55 minutes (Billing reflects 1 on 1 treatment time spent with patient)    Subjective     Patient reports: That she is feeling less left inferior angle pain.     She was compliant with home exercise program.    Response to previous treatment: felt good after last session    Functional change: symptoms noted with job duties, pain with cervical rotation to the left     Pain: 6/10     Location: left upper trap    Objective      Objective Measures updated at progress report or POC update only unless otherwise noted.       Treatment     Alexandra received the treatments listed below:     MANUAL THERAPY TECHNIQUES were applied for (10) minutes, including:    Manual Intervention Performed Today    Soft Tissue Mobilization [] left Periscapular musculature, Rhomboids and paraspinals.    Joint Mobilizations [x] Scapula release    []     []    Functional Dry Needling  []      Plan for Next Visit: Continue as needed       THERAPEUTIC EXERCISES to develop strength, endurance, ROM, flexibility, posture, and core stabilization for (30) minutes including:    Intervention Performed Today    UBE  x 3 minutes forward/backward    Rows x 3 x 10 Blue band (Cables 5#)   Open books x  15x on each side   Shoulder extension x   Green band  (Cables 5# ) 2x10   Shoulder ER   Red band (Cables 2.5# ) 2x10 B                     Plan for Next Visit:        NEUROMUSCULAR RE-EDUCATION ACTIVITIES to improve Balance, Coordination, Kinesthetic, Sense, Proprioception, and Posture for (10) minutes.  The following were included:    Intervention Performed Today    Series 6          X 10 scapula press  X 10 backward circles  X 10 swimmers   X 10 bear hugs  X 10 raise the roof   Prone Ts    2x10    Prone Ys    2x10   Cervical retraction with lift off x X 10 supine 1 pillow   Wall slides  x X 10  left only   Wall slides with lift off x 2 x 10 left only               Plan for Next Visit:          Patient Education and Home Exercises       Home Exercises Provided and Patient Education Provided     Education provided: (during session) minutes  PURPOSE: Patient educated on the impairments noted above and the effects of physical therapy intervention to improve overall condition and QUALITY OF LIFE.   EXERCISE: Patient was educated on all the above exercise prior/during/after for proper posture, positioning, and execution for safe performance with home exercise program.   Discussed discussed ergo break with patient and provided suggestions     Written Home Exercises Provided: yes.  Exercises were reviewed and Alexandra was able to demonstrate them prior to the end of the session.  Alexandra demonstrated good  understanding of the education provided. See EMR under Patient Instructions for exercises provided during therapy sessions.    Assessment     Patient was able to improve her range of motion with open books after instructions provided for correct movement quality. She was able to be progressed with left upper extremity movements against gravity and had good relief from manual therapy and latissimus stretch in sitting.     Alexandra is progressing well towards her goals.   Patient prognosis is Good.     Patient will continue to benefit from skilled outpatient physical therapy  to address the deficits listed in the problem list box on initial evaluation, provide pt/family education and to maximize patient's level of independence in the home and community environment.     Patient's spiritual, cultural and educational needs considered and pt agreeable to plan of care and goals.     Anticipated Barriers for therapy: occupation    Short Term Goals:  6 weeks Status  Date Met   PAIN: Pt will report worst pain of 6/10 in order to progress toward max functional ability and improve quality of life. [] Progressing  [] Met  [] Not Met     FUNCTION: Patient will demonstrate improved function as indicated by a score of greater than or equal to 65 out of 100 on FOTO. [] Progressing  [] Met  [] Not Met     MOBILITY: Patient will improve AROM to 50% of stated goals, listed in objective measures above, in order to progress towards independence with functional activities.  [] Progressing  [] Met  [] Not Met     STRENGTH: Patient will improve strength to 50% of stated goals, listed in objective measures above, in order to progress towards independence with functional activities. [] Progressing  [] Met  [] Not Met     POSTURE: Patient will correct postural deviations in sitting and standing, to decrease pain and promote long term stability.  [] Progressing  [] Met  [] Not Met     HEP: Patient will demonstrate independence with HEP in order to progress toward functional independence. [] Progressing  [] Met  [] Not Met        Long Term Goals:  12 weeks Status Date Met   PAIN: Pt will report worst pain of 2/10 in order to progress toward max functional ability and improve quality of life [] Progressing  [] Met  [] Not Met     FUNCTION: Patient will demonstrate improved function as indicated by a score of greater than or equal to 69 out of 100 on FOTO. [] Progressing  [] Met  [] Not Met     MOBILITY: Patient will improve AROM to stated goals, listed in objective measures above, in order to return to maximal  functional potential and improve quality of life. [] Progressing  [] Met  [] Not Met     STRENGTH: Patient will improve strength to stated goals, listed in objective measures above, in order to improve functional independence and quality of life.  [] Progressing  [] Met  [] Not Met     GAIT: Patient will demonstrate normalized gait mechanics with minimal compensation in order to return to PLOF. [] Progressing  [] Met  [] Not Met     Patient will return to normal ADL's, IADL's, community involvement, recreational activities, and work-related activities with less than or equal to 2/10 pain and maximal function.  [] Progressing  [] Met  [] Not Met           Plan     Continue Plan of Care (POC) and progress per patient tolerance. See treatment section for details on planned progressions next session.      Jed Taveras, PTA

## 2023-11-08 ENCOUNTER — CLINICAL SUPPORT (OUTPATIENT)
Dept: REHABILITATION | Facility: HOSPITAL | Age: 53
End: 2023-11-08
Payer: COMMERCIAL

## 2023-11-08 DIAGNOSIS — R29.898 DECREASED STRENGTH OF UPPER EXTREMITY: ICD-10-CM

## 2023-11-08 DIAGNOSIS — M25.512 CHRONIC LEFT SHOULDER PAIN: Primary | ICD-10-CM

## 2023-11-08 DIAGNOSIS — G89.29 CHRONIC LEFT SHOULDER PAIN: Primary | ICD-10-CM

## 2023-11-08 DIAGNOSIS — R68.89 DECREASED FUNCTIONAL ACTIVITY TOLERANCE: ICD-10-CM

## 2023-11-08 PROCEDURE — 97112 NEUROMUSCULAR REEDUCATION: CPT

## 2023-11-08 PROCEDURE — 97110 THERAPEUTIC EXERCISES: CPT

## 2023-11-08 PROCEDURE — 97140 MANUAL THERAPY 1/> REGIONS: CPT

## 2023-11-08 NOTE — PROGRESS NOTES
[facility]   Physical Therapy Treatment Note        Name: Alexandra [facility] Number: [id number]    Therapy Diagnosis:   Encounter Diagnoses   Name Primary?    Chronic left shoulder pain Yes    Decreased strength of upper extremity     Decreased functional activity tolerance            Physician: Tom Hendricks MD    Visit Date: 11/8/2023    Physician Orders: PT Eval and Treat  Medical Diagnosis from Referral: Strain of left shoulder, initial encounter [S46.912A]  Evaluation Date: 10/18/2023  Authorization Period Expiration: 12/31/2023  Plan of Care Expiration: 1/10/2024  Progress Note Due: 11/15/2024  Visit # / Visits authorized: 6/25 (+ evaluation)  FOTO: 1/3 (last performed on 10/18/2023)     Precautions: Standard  PTA Visit #: 2/5     Time In: 7:45 AM   Time Out: 8:30 AM  Total Billable Time: 45 minutes (Billing reflects 1 on 1 treatment time spent with patient)    Subjective     Patient reports: That she is feeling good today and she is having little to no pain today    She was compliant with home exercise program.    Response to previous treatment: felt good after last session    Functional change: symptoms noted with job duties, pain with cervical rotation to the left     Pain: 1/10     Location: Mid scap    Objective      Objective Measures updated at progress report or POC update only unless otherwise noted.       Treatment     Alexandra received the treatments listed below:     MANUAL THERAPY TECHNIQUES were applied for (10) minutes, including:    Manual Intervention Performed Today    Soft Tissue Mobilization [x] left Periscapular musculature, Rhomboids and paraspinals.    Joint Mobilizations [x] Scapula release, Prone PA grade 2-3 to T spine    []     []    Functional Dry Needling  []      Plan for Next Visit: Continue as needed       THERAPEUTIC EXERCISES to develop strength, endurance, ROM, flexibility, posture, and core stabilization for (15) minutes  including:    Intervention Performed Today    UBE  x 3 minutes forward/backward    Rows x 3 x 10 Blue band (Cables 5#)   Open books   15x on each side   Shoulder extension    Green band (Cables 5# ) 2x10   Shoulder ER x  Red band (Cables 2.5# ) 2x10 B    Kneeling Archers  x  YTB 15x on each side               Plan for Next Visit:        NEUROMUSCULAR RE-EDUCATION ACTIVITIES to improve Balance, Coordination, Kinesthetic, Sense, Proprioception, and Posture for (20) minutes.  The following were included:    Intervention Performed Today     Scap 6 RTB X  X  X  X  X  x X 10 Rows  X 10 T's  X 10 Y's  X 10 I's  X 10 90/90 ER  X 10 90/90 Press   Prone Ts  x  2x10    Prone Ys  x  2x10   Cervical retraction with lift off x X 20 supine 1 pillow   Wall washes x 1 min each up/down  side/side   circles   Wall slides   X 10  left only   Wall slides with lift off  2 x 10 left only               Plan for Next Visit:          Patient Education and Home Exercises       Home Exercises Provided and Patient Education Provided     Education provided: (during session) minutes  PURPOSE: Patient educated on the impairments noted above and the effects of physical therapy intervention to improve overall condition and QUALITY OF LIFE.   EXERCISE: Patient was educated on all the above exercise prior/during/after for proper posture, positioning, and execution for safe performance with home exercise program.   Discussed discussed ergo break with patient and provided suggestions     Written Home Exercises Provided: yes.  Exercises were reviewed and Alexandra was able to demonstrate them prior to the end of the session.  Alexandra demonstrated good  understanding of the education provided. See EMR under Patient Instructions for exercises provided during therapy sessions.    Assessment     Patient was able to tolerate progression of open books to kneeling archers, and tolerated addition of wall washes with only reports of muscular fatigue. Patient is  progressing well with ROM, strength and pain tolerance.     Alexandra is progressing well towards her goals.   Patient prognosis is Good.     Patient will continue to benefit from skilled outpatient physical therapy to address the deficits listed in the problem list box on initial evaluation, provide pt/family education and to maximize patient's level of independence in the home and community environment.     Patient's spiritual, cultural and educational needs considered and pt agreeable to plan of care and goals.     Anticipated Barriers for therapy: occupation    Short Term Goals:  6 weeks Status  Date Met   PAIN: Pt will report worst pain of 6/10 in order to progress toward max functional ability and improve quality of life. [] Progressing  [] Met  [] Not Met     FUNCTION: Patient will demonstrate improved function as indicated by a score of greater than or equal to 65 out of 100 on FOTO. [] Progressing  [] Met  [] Not Met     MOBILITY: Patient will improve AROM to 50% of stated goals, listed in objective measures above, in order to progress towards independence with functional activities.  [] Progressing  [] Met  [] Not Met     STRENGTH: Patient will improve strength to 50% of stated goals, listed in objective measures above, in order to progress towards independence with functional activities. [] Progressing  [] Met  [] Not Met     POSTURE: Patient will correct postural deviations in sitting and standing, to decrease pain and promote long term stability.  [] Progressing  [] Met  [] Not Met     HEP: Patient will demonstrate independence with HEP in order to progress toward functional independence. [] Progressing  [] Met  [] Not Met        Long Term Goals:  12 weeks Status Date Met   PAIN: Pt will report worst pain of 2/10 in order to progress toward max functional ability and improve quality of life [] Progressing  [] Met  [] Not Met     FUNCTION: Patient will demonstrate improved function as indicated by a score  of greater than or equal to 69 out of 100 on FOTO. [] Progressing  [] Met  [] Not Met     MOBILITY: Patient will improve AROM to stated goals, listed in objective measures above, in order to return to maximal functional potential and improve quality of life. [] Progressing  [] Met  [] Not Met     STRENGTH: Patient will improve strength to stated goals, listed in objective measures above, in order to improve functional independence and quality of life.  [] Progressing  [] Met  [] Not Met     GAIT: Patient will demonstrate normalized gait mechanics with minimal compensation in order to return to PLOF. [] Progressing  [] Met  [] Not Met     Patient will return to normal ADL's, IADL's, community involvement, recreational activities, and work-related activities with less than or equal to 2/10 pain and maximal function.  [] Progressing  [] Met  [] Not Met           Plan     Continue Plan of Care (POC) and progress per patient tolerance. See treatment section for details on planned progressions next session.      Shiraz Gleason, PT,DPT

## 2023-11-15 ENCOUNTER — TELEPHONE (OUTPATIENT)
Dept: REHABILITATION | Facility: HOSPITAL | Age: 53
End: 2023-11-15
Payer: COMMERCIAL

## 2023-11-15 NOTE — TELEPHONE ENCOUNTER
Name: Alexandra Rivas  Clinic Number: [id number]      Call Date: 11/15/2023    Reason for call: Late to appointment    Message Left:  Alexandra Rivas was called and a message was left informing patient of her appointment time and to call ahead if she is not going to make her appointment time. Call back number provided at [phone], with recommendations to call as soon as possible.     Plan: Follow up with patient next visit.     Shiraz Gleason, PT, DPT

## 2023-11-20 ENCOUNTER — CLINICAL SUPPORT (OUTPATIENT)
Dept: REHABILITATION | Facility: HOSPITAL | Age: 53
End: 2023-11-20
Payer: COMMERCIAL

## 2023-11-20 DIAGNOSIS — G89.29 CHRONIC LEFT SHOULDER PAIN: Primary | ICD-10-CM

## 2023-11-20 DIAGNOSIS — M25.512 CHRONIC LEFT SHOULDER PAIN: Primary | ICD-10-CM

## 2023-11-20 DIAGNOSIS — R29.898 DECREASED STRENGTH OF UPPER EXTREMITY: ICD-10-CM

## 2023-11-20 DIAGNOSIS — R68.89 DECREASED FUNCTIONAL ACTIVITY TOLERANCE: ICD-10-CM

## 2023-11-20 PROCEDURE — 97110 THERAPEUTIC EXERCISES: CPT | Mod: CQ

## 2023-11-20 PROCEDURE — 97112 NEUROMUSCULAR REEDUCATION: CPT | Mod: CQ

## 2023-11-20 NOTE — PLAN OF CARE
[facility]   Physical Therapy Treatment Note + Progress Report       [facility] [address] [facility] [id number]    Therapy Diagnosis:   Encounter Diagnoses   Name Primary?    Chronic left shoulder pain Yes    Decreased strength of upper extremity     Decreased functional activity tolerance              Physician: Tom Hendricks MD    Visit Date: 11/20/2023    Physician Orders: PT Eval and Treat  Medical Diagnosis from Referral: Strain of left shoulder, initial encounter [S46.912A]  Evaluation Date: 10/18/2023  Authorization Period Expiration: 12/31/2023  Plan of Care Expiration: 1/10/2024  Progress Note Due: 11/15/2024  Visit # / Visits authorized: 7/25 (+ evaluation)  FOTO: 1/3 (last performed on 10/18/2023)     Precautions: Standard  PTA Visit #: 7/5     Time In: 7:45 AM   Time Out: 0845  Total Billable Time: 55 minutes (Billing reflects 1 on 1 treatment time spent with patient)    Subjective     Patient reports: That she was sick last week and therefore had to cancel her therapy appointments and also worked less at her desk at her job last week. Because of her working less last week she feels that her pain is less because of her not being in her desk chair posture as long as usual. She reports she is still having some discomfort but less compared to before therapy was initiated.       She was compliant with home exercise program.    Response to previous treatment: felt good after last session    Functional change: symptoms noted with job duties, pain with cervical rotation to the left     Pain: 1/10     Location: Mid scap    Objective      Objective Measures updated at progress report or POC update only unless otherwise noted.          RANGE OF MOTION:   Cervical Right   (spine) Left     Pain/Dysfunction with Movement Goal Right 11/20/2023 Left 11/20/2023   Cervical Flexion (60º) 50 ---   60 59 ---   Cervical Extension (80º) 65 ---   80 46 ---   Cervical Side Bending  (45º) 45 45   - 43 43   Cervical Rotation (75º) 80 80          - 71 68       Shoulder AROM/PROM Right Left Pain/Dysfunction with Movement Goal Right 11/20/2023 Left 11/20/2023   Shoulder Flexion (180º) 172 170 Pain with overhead motion in mid Tspine on Left side  - 156 157   Shoulder Abduction (180º) 170 168   - 180 172   Shoulder Extension (60º) 50 50   -     Shoulder ER  at 90º (90º) 102 100 Pain in same spot on L with full ER - 90 90   Shoulder IR at 90º (70º) 70 68   - 44 47   Functional ER Superior angle of opposite scapula Superior angle of opposite scapula   - Fingertips to to T3 Fingertips to to T3   Functional IR Inferior angle of opposite scapula Inferior angle of opposite scapula   - Fingertips to to T6 Fingertips to to T6            STRENGTH:   U/E MMT Right Left Pain/Dysfunction with Movement Goal Right 11/20/2023 Left 11/20/2023   Shoulder Flexion 4/5 4/5   5/5 B 4/5 4/5   Shoulder Extension 4/5 4/5   5/5 B 4/5 4/5   Shoulder Abduction 4/5 4/5   5/5 B 4/5 4/5   Shoulder IR 4+/5 4+/5   5/5 B 4+/5 4+/5   Shoulder ER 4/5 4-/5   5/5 B 4/5 4/5   Serratus Anterior 4/5 4/5   5/5 B 4/5 4/5   Middle Trapezius 3+/5 3+/5 Reproduction of symptoms 5/5 B NT NT   Lower Trapezius 3+/5 3+/5 Reproduction of symptoms 5/5 B NT NT   Elbow Flexion  5/5 5/5   5/5 B 5/5 5/5   Elbow Extension 5/5 5/5   5/5 B 5/5 5/5   Wrist Flexion 5/5 4-/5   5/5 B 5/5 4/5   Wrist Extension 5/5 4/5   5/5 B 5/5 4+5                 Treatment     Alexandra received the treatments listed below:     MANUAL THERAPY TECHNIQUES were applied for (0) minutes, including:    Manual Intervention Performed Today    Soft Tissue Mobilization [] left Periscapular musculature, Rhomboids and paraspinals.    Joint Mobilizations [] Scapula release, Prone PA grade 2-3 to T spine    []     []    Functional Dry Needling  []      Plan for Next Visit: Continue as needed       THERAPEUTIC EXERCISES to develop strength, endurance, ROM, flexibility, posture, and core  stabilization for (45) minutes including: obtaining objective measurements    Intervention Performed Today    UBE  x 3 minutes forward/backward    Rows  3 x 10 Blue band (Cables 5#)   Open books   15x on each side   Shoulder extension    Green band (Cables 5# ) 2x10   Shoulder ER   Red band (Cables 2.5# ) 2x10 B    Kneeling Archers    YTB 15x on each side   reassessment x           Plan for Next Visit:        NEUROMUSCULAR RE-EDUCATION ACTIVITIES to improve Balance, Coordination, Kinesthetic, Sense, Proprioception, and Posture for (10) minutes.  The following were included:    Intervention Performed Today     Scap 6 RTB          X 10 Rows  X 10 T's  X 10 Y's  X 10 I's  X 10 90/90 ER  X 10 90/90 Press   Series 6 (added)   X 20 scapula protraction/retraction with pillow roll along spine  X 20 backward shoulder circles with pillow roll along spine  X 20 swimmers with pillow roll along spine  X 20 bear hugs with pillow roll along spine  X 20 raise the roof with pillow roll along spine   Prone Ts    2x10    Prone Ys    2x10   Cervical retraction with lift off  X 20 supine 1 pillow   Wall washes  1 min each up/down  side/side   circles   Wall slides   X 10  left only   Wall slides with lift off  2 x 10 left only   Shoulder horizontal abduction (added)  x 3 x 8 yellow band standing    Shoulder diagonal each direction (added)  x 3 x 5 yellow band standing     Plan for Next Visit:          Patient Education and Home Exercises       Home Exercises Provided and Patient Education Provided     Education provided: (during session) minutes  PURPOSE: Patient educated on the impairments noted above and the effects of physical therapy intervention to improve overall condition and QUALITY OF LIFE.   EXERCISE: Patient was educated on all the above exercise prior/during/after for proper posture, positioning, and execution for safe performance with home exercise program.   Discussed discussed ergo break with patient and provided  suggestions of stretches to be performed during breaks. Discussed frequency of every 20-25 minutes to get out of desk chair posture. Added work posture pictures to my chart for additional explanation.      Written Home Exercises Provided: yes.  Exercises were reviewed and Alexandra was able to demonstrate them prior to the end of the session.  Alexandra demonstrated good  understanding of the education provided. See EMR under Patient Instructions for exercises provided during therapy sessions.    Assessment     Mrs. Rivas reports she has made much progress since starting physical therapy. She continues to have pain in her left scapula but her pain is now less intense and less frequent. She is able to demonstrate an improvement in her range of motion in the following: cervical flexion, right shoulder abduction, and functional internal rotation/external rotation. Her strength has remained grossly the same. All objective measurements are compared to 10/17/2023. she has met her FOTO goal. She was able to be progressed to performing series 6 movements on a pillow roll along her spine and progressed with more aggressive scapula strengthening. She may benefit from additional physical therapy to address her remaining deficits.     Alexandra is progressing well towards her goals.   Patient prognosis is Good.     Patient will continue to benefit from skilled outpatient physical therapy to address the deficits listed in the problem list box on initial evaluation, provide pt/family education and to maximize patient's level of independence in the home and community environment.     Patient's spiritual, cultural and educational needs considered and pt agreeable to plan of care and goals.     Anticipated Barriers for therapy: occupation    Short Term Goals:  6 weeks Status  Date Met   PAIN: Pt will report worst pain of 6/10 in order to progress toward max functional ability and improve quality of life. [] Progressing  [x] Met  [] Not Met   11/20/2023   FUNCTION: Patient will demonstrate improved function as indicated by a score of greater than or equal to 65 out of 100 on FOTO. [] Progressing  [x] Met  [] Not Met 11/20/2023    MOBILITY: Patient will improve AROM to 50% of stated goals, listed in objective measures above, in order to progress towards independence with functional activities.  [x] Progressing  [] Met  [] Not Met     STRENGTH: Patient will improve strength to 50% of stated goals, listed in objective measures above, in order to progress towards independence with functional activities. [x] Progressing  [] Met  [] Not Met     POSTURE: Patient will correct postural deviations in sitting and standing, to decrease pain and promote long term stability.  [] Progressing  [x] Met  [] Not Met  11/20/2023   HEP: Patient will demonstrate independence with HEP in order to progress toward functional independence. [] Progressing  [x] Met  [] Not Met 11/20/2023       Long Term Goals:  12 weeks Status Date Met   PAIN: Pt will report worst pain of 2/10 in order to progress toward max functional ability and improve quality of life [] Progressing  [x] Met  [] Not Met 11/20/2023    FUNCTION: Patient will demonstrate improved function as indicated by a score of greater than or equal to 69 out of 100 on FOTO. [] Progressing  [x] Met  [] Not Met  11/20/2023   MOBILITY: Patient will improve AROM to stated goals, listed in objective measures above, in order to return to maximal functional potential and improve quality of life. [] Progressing  [] Met  [] Not Met     STRENGTH: Patient will improve strength to stated goals, listed in objective measures above, in order to improve functional independence and quality of life.  [] Progressing  [] Met  [] Not Met     GAIT: Patient will demonstrate normalized gait mechanics with minimal compensation in order to return to PLOF. [] Progressing  [x] Met  [] Not Met 11/20/2023    Patient will return to normal ADL's, IADL's,  community involvement, recreational activities, and work-related activities with less than or equal to 2/10 pain and maximal function.  [x] Progressing  [] Met  [] Not Met           Plan     Continue Plan of Care (POC) and progress per patient tolerance. See treatment section for details on planned progressions next session.      Jed Taveras, PTA

## 2023-11-20 NOTE — PROGRESS NOTES
[facility]   Physical Therapy Treatment Note + Progress Report       [facility] [id number]    Therapy Diagnosis:   Encounter Diagnoses   Name Primary?    Chronic left shoulder pain Yes    Decreased strength of upper extremity     Decreased functional activity tolerance              Physician: Tom Hendricks MD    Visit Date: 11/20/2023    Physician Orders: PT Eval and Treat  Medical Diagnosis from Referral: Strain of left shoulder, initial encounter [S46.912A]  Evaluation Date: 10/18/2023  Authorization Period Expiration: 12/31/2023  Plan of Care Expiration: 1/10/2024  Progress Note Due: 11/15/2024  Visit # / Visits authorized: 7/25 (+ evaluation)  FOTO: 1/3 (last performed on 10/18/2023)     Precautions: Standard  PTA Visit #: 7/5     Time In: 7:45 AM   Time Out: 0845  Total Billable Time: 55 minutes (Billing reflects 1 on 1 treatment time spent with patient)    Subjective     Patient reports: That she was sick last week and therefore had to cancel her therapy appointments and also worked less at her desk at her job last week. Because of her working less last week she feels that her pain is less because of her not being in her desk chair posture as long as usual. She reports she is still having some discomfort but less compared to before therapy was initiated.       She was compliant with home exercise program.    Response to previous treatment: felt good after last session    Functional change: symptoms noted with job duties, pain with cervical rotation to the left     Pain: 1/10     Location: Mid scap    Objective      Objective Measures updated at progress report or POC update only unless otherwise noted.          RANGE OF MOTION:   Cervical Right   (spine) Left     Pain/Dysfunction with Movement Goal Right 11/20/2023 Left 11/20/2023   Cervical Flexion (60º) 50 ---   60 59 ---   Cervical Extension (80º) 65 ---   80 46 ---   Cervical Side Bending  (45º) 45 45   - 43 43   Cervical Rotation (75º) 80 80          - 71 68       Shoulder AROM/PROM Right Left Pain/Dysfunction with Movement Goal Right 11/20/2023 Left 11/20/2023   Shoulder Flexion (180º) 172 170 Pain with overhead motion in mid Tspine on Left side  - 156 157   Shoulder Abduction (180º) 170 168   - 180 172   Shoulder Extension (60º) 50 50   -     Shoulder ER  at 90º (90º) 102 100 Pain in same spot on L with full ER - 90 90   Shoulder IR at 90º (70º) 70 68   - 44 47   Functional ER Superior angle of opposite scapula Superior angle of opposite scapula   - Fingertips to to T3 Fingertips to to T3   Functional IR Inferior angle of opposite scapula Inferior angle of opposite scapula   - Fingertips to to T6 Fingertips to to T6            STRENGTH:   U/E MMT Right Left Pain/Dysfunction with Movement Goal Right 11/20/2023 Left 11/20/2023   Shoulder Flexion 4/5 4/5   5/5 B 4/5 4/5   Shoulder Extension 4/5 4/5   5/5 B 4/5 4/5   Shoulder Abduction 4/5 4/5   5/5 B 4/5 4/5   Shoulder IR 4+/5 4+/5   5/5 B 4+/5 4+/5   Shoulder ER 4/5 4-/5   5/5 B 4/5 4/5   Serratus Anterior 4/5 4/5   5/5 B 4/5 4/5   Middle Trapezius 3+/5 3+/5 Reproduction of symptoms 5/5 B NT NT   Lower Trapezius 3+/5 3+/5 Reproduction of symptoms 5/5 B NT NT   Elbow Flexion  5/5 5/5   5/5 B 5/5 5/5   Elbow Extension 5/5 5/5   5/5 B 5/5 5/5   Wrist Flexion 5/5 4-/5   5/5 B 5/5 4/5   Wrist Extension 5/5 4/5   5/5 B 5/5 4+5                 Treatment     Alexandra received the treatments listed below:     MANUAL THERAPY TECHNIQUES were applied for (0) minutes, including:    Manual Intervention Performed Today    Soft Tissue Mobilization [] left Periscapular musculature, Rhomboids and paraspinals.    Joint Mobilizations [] Scapula release, Prone PA grade 2-3 to T spine    []     []    Functional Dry Needling  []      Plan for Next Visit: Continue as needed       THERAPEUTIC EXERCISES to develop strength, endurance, ROM, flexibility, posture, and core  stabilization for (45) minutes including: obtaining objective measurements    Intervention Performed Today    UBE  x 3 minutes forward/backward    Rows  3 x 10 Blue band (Cables 5#)   Open books   15x on each side   Shoulder extension    Green band (Cables 5# ) 2x10   Shoulder ER   Red band (Cables 2.5# ) 2x10 B    Kneeling Archers    YTB 15x on each side   reassessment x           Plan for Next Visit:        NEUROMUSCULAR RE-EDUCATION ACTIVITIES to improve Balance, Coordination, Kinesthetic, Sense, Proprioception, and Posture for (10) minutes.  The following were included:    Intervention Performed Today     Scap 6 RTB          X 10 Rows  X 10 T's  X 10 Y's  X 10 I's  X 10 90/90 ER  X 10 90/90 Press   Series 6 (added)   X 20 scapula protraction/retraction with pillow roll along spine  X 20 backward shoulder circles with pillow roll along spine  X 20 swimmers with pillow roll along spine  X 20 bear hugs with pillow roll along spine  X 20 raise the roof with pillow roll along spine   Prone Ts    2x10    Prone Ys    2x10   Cervical retraction with lift off  X 20 supine 1 pillow   Wall washes  1 min each up/down  side/side   circles   Wall slides   X 10  left only   Wall slides with lift off  2 x 10 left only   Shoulder horizontal abduction (added)  x 3 x 8 yellow band standing    Shoulder diagonal each direction (added)  x 3 x 5 yellow band standing     Plan for Next Visit:          Patient Education and Home Exercises       Home Exercises Provided and Patient Education Provided     Education provided: (during session) minutes  PURPOSE: Patient educated on the impairments noted above and the effects of physical therapy intervention to improve overall condition and QUALITY OF LIFE.   EXERCISE: Patient was educated on all the above exercise prior/during/after for proper posture, positioning, and execution for safe performance with home exercise program.   Discussed discussed ergo break with patient and provided  suggestions of stretches to be performed during breaks. Discussed frequency of every 20-25 minutes to get out of desk chair posture. Added work posture pictures to my chart for additional explanation.      Written Home Exercises Provided: yes.  Exercises were reviewed and Alexandra was able to demonstrate them prior to the end of the session.  Alexandra demonstrated good  understanding of the education provided. See EMR under Patient Instructions for exercises provided during therapy sessions.    Assessment     Mrs. Rivas reports she has made much progress since starting physical therapy. She continues to have pain in her left scapula but her pain is now less intense and less frequent. She is able to demonstrate an improvement in her range of motion in the following: cervical flexion, right shoulder abduction, and functional internal rotation/external rotation. Her strength has remained grossly the same. All objective measurements are compared to 10/17/2023. she has met her FOTO goal. She was able to be progressed to performing series 6 movements on a pillow roll along her spine and progressed with more aggressive scapula strengthening. She may benefit from additional physical therapy to address her remaining deficits.     Alexandra is progressing well towards her goals.   Patient prognosis is Good.     Patient will continue to benefit from skilled outpatient physical therapy to address the deficits listed in the problem list box on initial evaluation, provide pt/family education and to maximize patient's level of independence in the home and community environment.     Patient's spiritual, cultural and educational needs considered and pt agreeable to plan of care and goals.     Anticipated Barriers for therapy: occupation    Short Term Goals:  6 weeks Status  Date Met   PAIN: Pt will report worst pain of 6/10 in order to progress toward max functional ability and improve quality of life. [] Progressing  [x] Met  [] Not Met   11/20/2023   FUNCTION: Patient will demonstrate improved function as indicated by a score of greater than or equal to 65 out of 100 on FOTO. [] Progressing  [x] Met  [] Not Met 11/20/2023    MOBILITY: Patient will improve AROM to 50% of stated goals, listed in objective measures above, in order to progress towards independence with functional activities.  [x] Progressing  [] Met  [] Not Met     STRENGTH: Patient will improve strength to 50% of stated goals, listed in objective measures above, in order to progress towards independence with functional activities. [x] Progressing  [] Met  [] Not Met     POSTURE: Patient will correct postural deviations in sitting and standing, to decrease pain and promote long term stability.  [] Progressing  [x] Met  [] Not Met  11/20/2023   HEP: Patient will demonstrate independence with HEP in order to progress toward functional independence. [] Progressing  [x] Met  [] Not Met 11/20/2023       Long Term Goals:  12 weeks Status Date Met   PAIN: Pt will report worst pain of 2/10 in order to progress toward max functional ability and improve quality of life [] Progressing  [x] Met  [] Not Met 11/20/2023    FUNCTION: Patient will demonstrate improved function as indicated by a score of greater than or equal to 69 out of 100 on FOTO. [] Progressing  [x] Met  [] Not Met  11/20/2023   MOBILITY: Patient will improve AROM to stated goals, listed in objective measures above, in order to return to maximal functional potential and improve quality of life. [] Progressing  [] Met  [] Not Met     STRENGTH: Patient will improve strength to stated goals, listed in objective measures above, in order to improve functional independence and quality of life.  [] Progressing  [] Met  [] Not Met     GAIT: Patient will demonstrate normalized gait mechanics with minimal compensation in order to return to PLOF. [] Progressing  [x] Met  [] Not Met 11/20/2023    Patient will return to normal ADL's, IADL's,  community involvement, recreational activities, and work-related activities with less than or equal to 2/10 pain and maximal function.  [x] Progressing  [] Met  [] Not Met           Plan     Continue Plan of Care (POC) and progress per patient tolerance. See treatment section for details on planned progressions next session.      Jed Taveras, PTA

## 2023-11-22 ENCOUNTER — CLINICAL SUPPORT (OUTPATIENT)
Dept: REHABILITATION | Facility: HOSPITAL | Age: 53
End: 2023-11-22
Payer: COMMERCIAL

## 2023-11-22 DIAGNOSIS — R68.89 DECREASED FUNCTIONAL ACTIVITY TOLERANCE: ICD-10-CM

## 2023-11-22 DIAGNOSIS — G89.29 CHRONIC LEFT SHOULDER PAIN: Primary | ICD-10-CM

## 2023-11-22 DIAGNOSIS — R29.898 DECREASED STRENGTH OF UPPER EXTREMITY: ICD-10-CM

## 2023-11-22 DIAGNOSIS — M25.512 CHRONIC LEFT SHOULDER PAIN: Primary | ICD-10-CM

## 2023-11-22 PROCEDURE — 97112 NEUROMUSCULAR REEDUCATION: CPT

## 2023-11-22 PROCEDURE — 97110 THERAPEUTIC EXERCISES: CPT

## 2023-11-22 PROCEDURE — 97140 MANUAL THERAPY 1/> REGIONS: CPT

## 2023-11-22 NOTE — PROGRESS NOTES
[facility]   Physical Therapy Treatment Note + Progress Report       Name: Alexandra [facility] Number: [id number]    Therapy Diagnosis:   Encounter Diagnoses   Name Primary?    Chronic left shoulder pain Yes    Decreased strength of upper extremity     Decreased functional activity tolerance              Physician: Tom Hendricks MD    Visit Date: 11/22/2023    Physician Orders: PT Eval and Treat  Medical Diagnosis from Referral: Strain of left shoulder, initial encounter [S46.912A]  Evaluation Date: 10/18/2023  Authorization Period Expiration: 12/31/2023  Plan of Care Expiration: 1/10/2024  Progress Note Due: 11/15/2024  Visit # / Visits authorized: 7/25 (+ evaluation)  FOTO: 1/3 (last performed on 10/18/2023)     Precautions: Standard  PTA Visit #: 7/5     Time In: 7:45 AM   Time Out: 0830 AM  Total Billable Time: 45 minutes (Billing reflects 1 on 1 treatment time spent with patient)    Subjective     Patient reports: That she was feeling ill last week which is why she cancelled her appointment, overall she feels her pain has gotten a lot better, and reports it as more of a dull ache than anything.     She was compliant with home exercise program.    Response to previous treatment: felt good after last session    Functional change: symptoms noted with job duties, pain with cervical rotation to the left     Pain: 1/10     Location: Mid scap    Objective      Objective Measures updated at progress report or POC update only unless otherwise noted.       Treatment     Alexandra received the treatments listed below:     MANUAL THERAPY TECHNIQUES were applied for (8) minutes, including:    Manual Intervention Performed Today    Soft Tissue Mobilization [] left Periscapular musculature, Rhomboids and paraspinals.    Joint Mobilizations [x] Scapula release, Prone PA grade 2-3 to T spine    []     []    Functional Dry Needling  []      Plan for Next Visit: Continue as needed        THERAPEUTIC EXERCISES to develop strength, endurance, ROM, flexibility, posture, and core stabilization for (12) minutes including: obtaining objective measurements    Intervention Performed Today    UBE  x 3 minutes forward/backward    Rows  3 x 10 Blue band (Cables 5#)   Open books   15x on each side   Shoulder extension x   Green band (Cables 5# ) 2x10   Shoulder ER x  Red band (Cables 2.5# ) 2x10 B    Kneeling Archers  x  RTB 15x on each side               Plan for Next Visit:        NEUROMUSCULAR RE-EDUCATION ACTIVITIES to improve Balance, Coordination, Kinesthetic, Sense, Proprioception, and Posture for (25) minutes.  The following were included:    Intervention Performed Today     Scap 6 RTB          X 10 Rows  X 10 T's  X 10 Y's  X 10 I's  X 10 90/90 ER  X 10 90/90 Press   Series 6 (added)   X 20 scapula protraction/retraction with pillow roll along spine  X 20 backward shoulder circles with pillow roll along spine  X 20 swimmers with pillow roll along spine  X 20 bear hugs with pillow roll along spine  X 20 raise the roof with pillow roll along spine   Prone Ts  x  2x10    Prone Ys  x  2x10   Cervical retraction with lift off  X 20 supine 1 pillow   Wall washes x 1 min each up/down  side/side   circles   Wall slides   X 10  left only   Wall slides with lift off  2 x 10 left only   Shoulder horizontal abduction (added)  x 2x10 yellow band standing    Shoulder Ys x 2 x 10 yellow band standing     Plan for Next Visit:          Patient Education and Home Exercises       Home Exercises Provided and Patient Education Provided     Education provided: (during session) minutes  PURPOSE: Patient educated on the impairments noted above and the effects of physical therapy intervention to improve overall condition and QUALITY OF LIFE.   EXERCISE: Patient was educated on all the above exercise prior/during/after for proper posture, positioning, and execution for safe performance with home exercise program.   Discussed  discussed ergo break with patient and provided suggestions of stretches to be performed during breaks. Discussed frequency of every 20-25 minutes to get out of desk chair posture. Added work posture pictures to my chart for additional explanation.      Written Home Exercises Provided: yes.  Exercises were reviewed and Alexandra was able to demonstrate them prior to the end of the session.  Alexandra demonstrated good  understanding of the education provided. See EMR under Patient Instructions for exercises provided during therapy sessions.    Assessment     Patient tolerated this session well without significant shoulder pain or discomfort. She needed slight tactile cueing for scapular retraction with prone Ts and Ys.  She has been progressing well with PT overall and discussed with the patient about potentially discharging soon if we continue on this trend.     Alexandra is progressing well towards her goals.   Patient prognosis is Good.     Patient will continue to benefit from skilled outpatient physical therapy to address the deficits listed in the problem list box on initial evaluation, provide pt/family education and to maximize patient's level of independence in the home and community environment.     Patient's spiritual, cultural and educational needs considered and pt agreeable to plan of care and goals.     Anticipated Barriers for therapy: occupation    Short Term Goals:  6 weeks Status  Date Met   PAIN: Pt will report worst pain of 6/10 in order to progress toward max functional ability and improve quality of life. [] Progressing  [x] Met  [] Not Met  11/20/2023   FUNCTION: Patient will demonstrate improved function as indicated by a score of greater than or equal to 65 out of 100 on FOTO. [] Progressing  [x] Met  [] Not Met 11/20/2023    MOBILITY: Patient will improve AROM to 50% of stated goals, listed in objective measures above, in order to progress towards independence with functional activities.  [x]  Progressing  [] Met  [] Not Met     STRENGTH: Patient will improve strength to 50% of stated goals, listed in objective measures above, in order to progress towards independence with functional activities. [x] Progressing  [] Met  [] Not Met     POSTURE: Patient will correct postural deviations in sitting and standing, to decrease pain and promote long term stability.  [] Progressing  [x] Met  [] Not Met  11/20/2023   HEP: Patient will demonstrate independence with HEP in order to progress toward functional independence. [] Progressing  [x] Met  [] Not Met 11/20/2023       Long Term Goals:  12 weeks Status Date Met   PAIN: Pt will report worst pain of 2/10 in order to progress toward max functional ability and improve quality of life [] Progressing  [x] Met  [] Not Met 11/20/2023    FUNCTION: Patient will demonstrate improved function as indicated by a score of greater than or equal to 69 out of 100 on FOTO. [] Progressing  [x] Met  [] Not Met  11/20/2023   MOBILITY: Patient will improve AROM to stated goals, listed in objective measures above, in order to return to maximal functional potential and improve quality of life. [] Progressing  [] Met  [] Not Met     STRENGTH: Patient will improve strength to stated goals, listed in objective measures above, in order to improve functional independence and quality of life.  [] Progressing  [] Met  [] Not Met     GAIT: Patient will demonstrate normalized gait mechanics with minimal compensation in order to return to PLOF. [] Progressing  [x] Met  [] Not Met 11/20/2023    Patient will return to normal ADL's, IADL's, community involvement, recreational activities, and work-related activities with less than or equal to 2/10 pain and maximal function.  [x] Progressing  [] Met  [] Not Met           Plan     Continue Plan of Care (POC) and progress per patient tolerance. See treatment section for details on planned progressions next session.      Shiraz Gleason, PT,DPT

## 2023-11-27 ENCOUNTER — CLINICAL SUPPORT (OUTPATIENT)
Dept: REHABILITATION | Facility: HOSPITAL | Age: 53
End: 2023-11-27
Payer: COMMERCIAL

## 2023-11-27 DIAGNOSIS — R68.89 DECREASED FUNCTIONAL ACTIVITY TOLERANCE: ICD-10-CM

## 2023-11-27 DIAGNOSIS — M25.512 CHRONIC LEFT SHOULDER PAIN: Primary | ICD-10-CM

## 2023-11-27 DIAGNOSIS — G89.29 CHRONIC LEFT SHOULDER PAIN: Primary | ICD-10-CM

## 2023-11-27 DIAGNOSIS — R29.898 DECREASED STRENGTH OF UPPER EXTREMITY: ICD-10-CM

## 2023-11-27 PROCEDURE — 97112 NEUROMUSCULAR REEDUCATION: CPT | Mod: CQ

## 2023-11-27 PROCEDURE — 97140 MANUAL THERAPY 1/> REGIONS: CPT | Mod: CQ

## 2023-11-27 PROCEDURE — 97110 THERAPEUTIC EXERCISES: CPT | Mod: CQ

## 2023-11-27 NOTE — PROGRESS NOTES
[facility]   Physical Therapy Treatment Note        Name: Alexandra [facility] Number: [id number]    Therapy Diagnosis:   Encounter Diagnoses   Name Primary?    Chronic left shoulder pain Yes    Decreased strength of upper extremity     Decreased functional activity tolerance        Physician: Tom Hendricks MD    Visit Date: 11/27/2023    Physician Orders: PT Eval and Treat  Medical Diagnosis from Referral: Strain of left shoulder, initial encounter [S46.912A]  Evaluation Date: 10/18/2023  Authorization Period Expiration: 12/31/2023  Plan of Care Expiration: 1/10/2024  Progress Note Due: 11/15/2024  Visit # / Visits authorized: 9/25 (+ evaluation)  FOTO: 1/3 (last performed on 10/18/2023)     Precautions: Standard  PTA Visit #: 1/5     Time In: 7:45 AM   Time Out: 0840 AM  Total Billable Time: 45 minutes (Billing reflects 1 on 1 treatment time spent with patient)    Subjective     Patient reports: That she was able to cook for the holiday without scapula/shoulder pain.      She was compliant with home exercise program.    Response to previous treatment: felt good after last session    Functional change: symptoms noted with job duties, pain with cervical rotation to the left     Pain: 0/10     Location: Mid scapula (no pain today)    Objective      Objective Measures updated at progress report or POC update only unless otherwise noted.       Treatment     Alexandra received the treatments listed below:     MANUAL THERAPY TECHNIQUES were applied for (10) minutes, including:    Manual Intervention Performed Today    Soft Tissue Mobilization x Bilateral Periscapular musculature, Rhomboids and paraspinals.    Joint Mobilizations [x] Scapula release bilateral     []     []    Functional Dry Needling  []      Plan for Next Visit: Continue as needed       THERAPEUTIC EXERCISES to develop strength, endurance, ROM, flexibility, posture, and core stabilization for (15) minutes  including: obtaining objective measurements    Intervention Performed Today    UBE  x 3 minutes forward/backward level 2 (increased)    Rows  3 x 10 Blue band (Cables 5#)   Open books   15x on each side   Shoulder extension x 3 x 10 Cables 5 pound    Shoulder ER   Red band 3 x 12 Bilateral     1/2 Kneeling Archers (open books) x Red band  3 x 5 bilateral                Plan for Next Visit:        NEUROMUSCULAR RE-EDUCATION ACTIVITIES to improve Balance, Coordination, Kinesthetic, Sense, Proprioception, and Posture for (25) minutes.  The following were included:    Intervention Performed Today     Scap 6 Red band           x X 10 Rows  X 10 T's  X 10 Y's  X 10 I's  X 10 90/90 ER  3 x 7  90/90 Press (increased)    Series 6 (added)      X    x X 20 scapula protraction/retraction with pillow roll along spine  X 20 backward shoulder circles with pillow roll along spine  X 20 swimmers with pillow roll along spine  X 20 bear hugs with pillow roll along spine  X 20 raise the roof with pillow roll along spine   Prone Ts    2x10    Prone Ys    2x10   Cervical retraction with lift off  X 20 supine 1 pillow   Wall washes  1 min each up/down  side/side   circles   Wall slides   X 10  left only   Cabinet reach overhead (added)  x 2 x 10   Wall slides with lift off  2 x 10 left only   Shoulder horizontal abduction (added)   2x10 yellow band standing    Shoulder Ys  2 x 10 yellow band standing     Plan for Next Visit:          Patient Education and Home Exercises       Home Exercises Provided and Patient Education Provided     Education provided: (during session) minutes  PURPOSE: Patient educated on the impairments noted above and the effects of physical therapy intervention to improve overall condition and QUALITY OF LIFE.   EXERCISE: Patient was educated on all the above exercise prior/during/after for proper posture, positioning, and execution for safe performance with home exercise program.   Discussed ergo break with patient  and provided suggestions of stretches to be performed during breaks. Discussed frequency of every 20-25 minutes to get out of desk chair posture. Added work posture pictures to my chart for additional explanation.   11/27/2023: educated on how to change position of head resulting in a different muscle fiber stretch sensation. Discussed appropriate force and frequency.      Written Home Exercises Provided: yes.  Exercises were reviewed and Alexandra was able to demonstrate them prior to the end of the session.  Alexandra demonstrated good  understanding of the education provided. See EMR under Patient Instructions for exercises provided during therapy sessions.    Assessment     Patient tolerated this session with progressions being made. Fatigue noted with 1/2 kneeling open books with improvement in exercise quality after instructed to rest more frequently. She tolerated this session with no complaints of pain from any exercise but still with tenderness in her bilateral upper trapezius muscles left more symptomatic than right.     Alexandra is progressing well towards her goals.   Patient prognosis is Good.     Patient will continue to benefit from skilled outpatient physical therapy to address the deficits listed in the problem list box on initial evaluation, provide pt/family education and to maximize patient's level of independence in the home and community environment.     Patient's spiritual, cultural and educational needs considered and pt agreeable to plan of care and goals.     Anticipated Barriers for therapy: occupation    Short Term Goals:  6 weeks Status  Date Met   PAIN: Pt will report worst pain of 6/10 in order to progress toward max functional ability and improve quality of life. [] Progressing  [x] Met  [] Not Met  11/20/2023   FUNCTION: Patient will demonstrate improved function as indicated by a score of greater than or equal to 65 out of 100 on FOTO. [] Progressing  [x] Met  [] Not Met 11/20/2023     MOBILITY: Patient will improve AROM to 50% of stated goals, listed in objective measures above, in order to progress towards independence with functional activities.  [x] Progressing  [] Met  [] Not Met     STRENGTH: Patient will improve strength to 50% of stated goals, listed in objective measures above, in order to progress towards independence with functional activities. [x] Progressing  [] Met  [] Not Met     POSTURE: Patient will correct postural deviations in sitting and standing, to decrease pain and promote long term stability.  [] Progressing  [x] Met  [] Not Met  11/20/2023   HEP: Patient will demonstrate independence with HEP in order to progress toward functional independence. [] Progressing  [x] Met  [] Not Met 11/20/2023       Long Term Goals:  12 weeks Status Date Met   PAIN: Pt will report worst pain of 2/10 in order to progress toward max functional ability and improve quality of life [] Progressing  [x] Met  [] Not Met 11/20/2023    FUNCTION: Patient will demonstrate improved function as indicated by a score of greater than or equal to 69 out of 100 on FOTO. [] Progressing  [x] Met  [] Not Met  11/20/2023   MOBILITY: Patient will improve AROM to stated goals, listed in objective measures above, in order to return to maximal functional potential and improve quality of life. [] Progressing  [] Met  [] Not Met     STRENGTH: Patient will improve strength to stated goals, listed in objective measures above, in order to improve functional independence and quality of life.  [] Progressing  [] Met  [] Not Met     GAIT: Patient will demonstrate normalized gait mechanics with minimal compensation in order to return to PLOF. [] Progressing  [x] Met  [] Not Met 11/20/2023    Patient will return to normal ADL's, IADL's, community involvement, recreational activities, and work-related activities with less than or equal to 2/10 pain and maximal function.  [x] Progressing  [] Met  [] Not Met           Plan      Continue Plan of Care (POC) and progress per patient tolerance. See treatment section for details on planned progressions next session.      Jed Taveras, PTA

## 2023-11-29 ENCOUNTER — CLINICAL SUPPORT (OUTPATIENT)
Dept: REHABILITATION | Facility: HOSPITAL | Age: 53
End: 2023-11-29
Payer: COMMERCIAL

## 2023-11-29 DIAGNOSIS — R68.89 DECREASED FUNCTIONAL ACTIVITY TOLERANCE: ICD-10-CM

## 2023-11-29 DIAGNOSIS — M25.512 CHRONIC LEFT SHOULDER PAIN: Primary | ICD-10-CM

## 2023-11-29 DIAGNOSIS — R29.898 DECREASED STRENGTH OF UPPER EXTREMITY: ICD-10-CM

## 2023-11-29 DIAGNOSIS — G89.29 CHRONIC LEFT SHOULDER PAIN: Primary | ICD-10-CM

## 2023-11-29 PROCEDURE — 97112 NEUROMUSCULAR REEDUCATION: CPT

## 2023-11-29 PROCEDURE — 97140 MANUAL THERAPY 1/> REGIONS: CPT

## 2023-11-29 PROCEDURE — 97110 THERAPEUTIC EXERCISES: CPT

## 2023-11-29 PROCEDURE — 97530 THERAPEUTIC ACTIVITIES: CPT

## 2023-11-29 NOTE — PLAN OF CARE
[facility]  Physical Therapy Discharge Note    [id number]    Therapy Diagnosis:   Encounter Diagnoses   Name Primary?    Chronic left shoulder pain Yes    Decreased strength of upper extremity     Decreased functional activity tolerance      Physician: Tom Hendricks MD    Physician Orders: PT Eval and Treat  Medical Diagnosis from Referral: Strain of left shoulder, initial encounter [S46.912A]  Evaluation Date: 10/18/2023      Date of Last visit: 11/29/2023  Total Visits Received: 11    ASSESSMENT      Patient no longer has any pain with her normal ADL's and functional task. She is able to tolerate a full workday without significant discomfort. She has met all her goals and no longer is in need of skilled physical therapy.     Discharge reason: Patient has met all of his/her goals    Discharge FOTO Score: 95%    Goals:   Short Term Goals:  6 weeks Status  Date Met   PAIN: Pt will report worst pain of 6/10 in order to progress toward max functional ability and improve quality of life. [] Progressing  [x] Met  [] Not Met  11/20/2023   FUNCTION: Patient will demonstrate improved function as indicated by a score of greater than or equal to 65 out of 100 on FOTO. [] Progressing  [x] Met  [] Not Met 11/20/2023    MOBILITY: Patient will improve AROM to 50% of stated goals, listed in objective measures above, in order to progress towards independence with functional activities.  [] Progressing  [x] Met  [] Not Met 11/29/23     STRENGTH: Patient will improve strength to 50% of stated goals, listed in objective measures above, in order to progress towards independence with functional activities. [] Progressing  [x] Met  [] Not Met 11/29/23     POSTURE: Patient will correct postural deviations in sitting and standing, to decrease pain and promote long term stability.  [] Progressing  [x] Met  [] Not Met  11/20/2023   HEP: Patient will demonstrate  independence with HEP in order to progress toward functional independence. [] Progressing  [x] Met  [] Not Met 11/20/2023       Long Term Goals:  12 weeks Status Date Met   PAIN: Pt will report worst pain of 2/10 in order to progress toward max functional ability and improve quality of life [] Progressing  [x] Met  [] Not Met 11/20/2023    FUNCTION: Patient will demonstrate improved function as indicated by a score of greater than or equal to 69 out of 100 on FOTO. [] Progressing  [x] Met  [] Not Met  11/20/2023   MOBILITY: Patient will improve AROM to stated goals, listed in objective measures above, in order to return to maximal functional potential and improve quality of life. [] Progressing  [x] Met  [] Not Met 11/29/23    STRENGTH: Patient will improve strength to stated goals, listed in objective measures above, in order to improve functional independence and quality of life.  [] Progressing  [x] Met  [] Not Met  11/29/23    GAIT: Patient will demonstrate normalized gait mechanics with minimal compensation in order to return to PLOF. [] Progressing  [x] Met  [] Not Met 11/20/2023    Patient will return to normal ADL's, IADL's, community involvement, recreational activities, and work-related activities with less than or equal to 2/10 pain and maximal function.  [] Progressing  [x] Met  [] Not Met  11/29/23             PLAN   This patient is discharged from Physical Therapy      Shiraz Gleason, PT ,DPT

## 2023-11-29 NOTE — PROGRESS NOTES
[facility]   Physical Therapy Treatment Note + Discharge       [facility] [id number]    Therapy Diagnosis:   Encounter Diagnoses   Name Primary?    Chronic left shoulder pain Yes    Decreased strength of upper extremity     Decreased functional activity tolerance          Physician: Tom Hendricks MD    Visit Date: 11/29/2023    Physician Orders: PT Eval and Treat  Medical Diagnosis from Referral: Strain of left shoulder, initial encounter [S46.912A]  Evaluation Date: 10/18/2023  Authorization Period Expiration: 12/31/2023  Plan of Care Expiration: 1/10/2024  Progress Note Due: 12/18/23  Visit # / Visits authorized: 9/25 (+ evaluation)  FOTO: 1/3 (last performed on 10/18/2023)     Precautions: Standard  PTA Visit #: 1/5     Time In: 7:45 AM   Time Out: 0840 AM  Total Billable Time: 45 minutes (Billing reflects 1 on 1 treatment time spent with patient)    Subjective     Patient reports: That she has not had any pain over the last two weeks, and she has been feeling really good. She has been doing all of her at home exercises.     She was compliant with home exercise program.    Response to previous treatment: felt good after last session    Functional change: Able to tolerate working full days without any pain.     Pain: 0/10     Location: Mid scapula (no pain today)    Objective      Objective Measures updated at progress report or POC update only unless otherwise noted.          RANGE OF MOTION:   Cervical Right   (spine) Left     Pain/Dysfunction with Movement Goal Right 11/29/2023 Left 11/29/2023   Cervical Flexion (60º) 50 ---   60 60 ---   Cervical Extension (80º) 65 ---   80 75 ---   Cervical Side Bending (45º) 45 45   - 45 45   Cervical Rotation (75º) 80 80          - 75 72              STRENGTH:   U/E MMT Right Left Pain/Dysfunction with Movement Goal   Shoulder Flexion 5/5 5/5   5/5 B   Shoulder Extension 5/5 5/5   5/5 B   Shoulder  Abduction 5/5 5/5   5/5 B   Shoulder IR 5/5 5/5   5/5 B   Shoulder ER 5/5 5/5   5/5 B   Serratus Anterior 5/5 5/5   5/5 B   Middle Trapezius 5/5 5/5  5/5 B   Lower Trapezius 5/5 5/5  5/5 B   Elbow Flexion  5/5 5/5   5/5 B   Elbow Extension 5/5 5/5   5/5 B   Wrist Flexion 5/5 5/5   5/5 B   Wrist Extension 5/5 5/5   5/5 B               Treatment     Alexandra received the treatments listed below:     MANUAL THERAPY TECHNIQUES were applied for (9) minutes, including:    Manual Intervention Performed Today    Soft Tissue Mobilization []  [] Bilateral Periscapular musculature, Rhomboids  Scapula release bilateral    Joint Mobilizations [x] Prone PA T spine and HVLAT of T spine    []     []    Functional Dry Needling  []      Plan for Next Visit: Continue as needed       THERAPEUTIC EXERCISES to develop strength, endurance, ROM, flexibility, posture, and core stabilization for (12) minutes including: obtaining objective measurements    Intervention Performed Today    UBE  x 3 minutes forward/backward level 2 (increased)    Rows  3 x 10 Blue band (Cables 5#)   Single arm shoulder flexion with Iso abduction x  2x5 bilateral  on each side  RTB   Shoulder extension x 3 x 10 Cables 5 pound    Shoulder ER   Red band 3 x 12 Bilateral     1/2 Kneeling Archers (open books) x Red band  3 x 5 bilateral                Plan for Next Visit:        NEUROMUSCULAR RE-EDUCATION ACTIVITIES to improve Balance, Coordination, Kinesthetic, Sense, Proprioception, and Posture for (12) minutes.  The following were included:    Intervention Performed Today     Scap 6 Red band  X 10 Rows  X 10 T's  X 10 Y's  X 10 I's  X 10 90/90 ER  3 x 7  90/90 Press (increased)    Series 6 (added)   X 20 scapula protraction/retraction with pillow roll along spine  X 20 backward shoulder circles with pillow roll along spine  X 20 swimmers with pillow roll along spine  X 20 bear hugs with pillow roll along spine  X 20 raise the roof with pillow roll along spine    Cervical retraction with isometric extension x GTB 5sec hold 2x10    Wall washes x 1 min each up/down  side/side   circles   Cabinet reach overhead (added)   2 x 10   Wall slides with lift off  2 x 10 left only   Shoulder horizontal abduction (added)   2x10 yellow band standing    Shoulder Ys  2 x 10 yellow band standing     Plan for Next Visit:          THERAPEUTIC ACTIVITIES to improve dynamic and functional  performance for (12) minutes including:    Intervention Performed Today     Carries x 3 laps   OH Press x 3x10    OH Carries. x  3 laps                              Plan for Next Visit:           Patient Education and Home Exercises       Home Exercises Provided and Patient Education Provided     Education provided: (during session) minutes  PURPOSE: Patient educated on the impairments noted above and the effects of physical therapy intervention to improve overall condition and QUALITY OF LIFE.   EXERCISE: Patient was educated on all the above exercise prior/during/after for proper posture, positioning, and execution for safe performance with home exercise program.   Discussed ergo break with patient and provided suggestions of stretches to be performed during breaks. Discussed frequency of every 20-25 minutes to get out of desk chair posture. Added work posture pictures to my chart for additional explanation.   11/27/2023: educated on how to change position of head resulting in a different muscle fiber stretch sensation. Discussed appropriate force and frequency.      Written Home Exercises Provided: yes.  Exercises were reviewed and Alexandra was able to demonstrate them prior to the end of the session.  Alexandra demonstrated good  understanding of the education provided. See EMR under Patient Instructions for exercises provided during therapy sessions.    Assessment   Patient tolerate today session well with no reports of pain/discomfort in her shoulder with higher level activities. She has progressed  well with PT and is being discharged to home program.     Alexandra is progressing well towards her goals.   Patient prognosis is Good.     Patient will continue to benefit from skilled outpatient physical therapy to address the deficits listed in the problem list box on initial evaluation, provide pt/family education and to maximize patient's level of independence in the home and community environment.     Patient's spiritual, cultural and educational needs considered and pt agreeable to plan of care and goals.     Anticipated Barriers for therapy: occupation    Short Term Goals:  6 weeks Status  Date Met   PAIN: Pt will report worst pain of 6/10 in order to progress toward max functional ability and improve quality of life. [] Progressing  [x] Met  [] Not Met  11/20/2023   FUNCTION: Patient will demonstrate improved function as indicated by a score of greater than or equal to 65 out of 100 on FOTO. [] Progressing  [x] Met  [] Not Met 11/20/2023    MOBILITY: Patient will improve AROM to 50% of stated goals, listed in objective measures above, in order to progress towards independence with functional activities.  [] Progressing  [x] Met  [] Not Met 11/29/23     STRENGTH: Patient will improve strength to 50% of stated goals, listed in objective measures above, in order to progress towards independence with functional activities. [] Progressing  [x] Met  [] Not Met 11/29/23     POSTURE: Patient will correct postural deviations in sitting and standing, to decrease pain and promote long term stability.  [] Progressing  [x] Met  [] Not Met  11/20/2023   HEP: Patient will demonstrate independence with HEP in order to progress toward functional independence. [] Progressing  [x] Met  [] Not Met 11/20/2023       Long Term Goals:  12 weeks Status Date Met   PAIN: Pt will report worst pain of 2/10 in order to progress toward max functional ability and improve quality of life [] Progressing  [x] Met  [] Not Met 11/20/2023     FUNCTION: Patient will demonstrate improved function as indicated by a score of greater than or equal to 69 out of 100 on FOTO. [] Progressing  [x] Met  [] Not Met  11/20/2023   MOBILITY: Patient will improve AROM to stated goals, listed in objective measures above, in order to return to maximal functional potential and improve quality of life. [] Progressing  [x] Met  [] Not Met 11/29/23    STRENGTH: Patient will improve strength to stated goals, listed in objective measures above, in order to improve functional independence and quality of life.  [] Progressing  [x] Met  [] Not Met  11/29/23    GAIT: Patient will demonstrate normalized gait mechanics with minimal compensation in order to return to PLOF. [] Progressing  [x] Met  [] Not Met 11/20/2023    Patient will return to normal ADL's, IADL's, community involvement, recreational activities, and work-related activities with less than or equal to 2/10 pain and maximal function.  [] Progressing  [x] Met  [] Not Met  11/29/23          Plan     Discharge from PT      Shiraz Gleason PT,DPT

## 2024-03-18 ENCOUNTER — HOSPITAL ENCOUNTER (OUTPATIENT)
Dept: RADIOLOGY | Facility: HOSPITAL | Age: 54
Discharge: HOME | End: 2024-03-18
Attending: PEDIATRICS
Payer: COMMERCIAL

## 2024-03-18 DIAGNOSIS — Z80.8 FH: THYROID CANCER: ICD-10-CM

## 2024-03-18 PROCEDURE — 76536 US EXAM OF HEAD AND NECK: CPT | Mod: 26,,, | Performed by: RADIOLOGY

## 2024-03-18 PROCEDURE — 76536 US EXAM OF HEAD AND NECK: CPT | Mod: TC

## 2024-03-25 ENCOUNTER — OFFICE VISIT (OUTPATIENT)
Dept: INTERNAL MEDICINE | Facility: CLINIC | Age: 54
End: 2024-03-25
Payer: COMMERCIAL

## 2024-03-25 ENCOUNTER — HOSPITAL ENCOUNTER (OUTPATIENT)
Dept: CARDIOLOGY | Facility: HOSPITAL | Age: 54
Discharge: HOME | End: 2024-03-25
Attending: PEDIATRICS
Payer: COMMERCIAL

## 2024-03-25 VITALS
RESPIRATION RATE: 17 BRPM | WEIGHT: 112.88 LBS | HEIGHT: 65 IN | DIASTOLIC BLOOD PRESSURE: 76 MMHG | OXYGEN SATURATION: 99 % | BODY MASS INDEX: 18.81 KG/M2 | SYSTOLIC BLOOD PRESSURE: 118 MMHG | HEART RATE: 70 BPM | TEMPERATURE: 97 F

## 2024-03-25 DIAGNOSIS — R07.89 ATYPICAL CHEST PAIN: ICD-10-CM

## 2024-03-25 DIAGNOSIS — Z80.0 FH: COLON CANCER IN FIRST DEGREE RELATIVE <60 YEARS OLD: ICD-10-CM

## 2024-03-25 DIAGNOSIS — E55.9 VITAMIN D DEFICIENCY DISEASE: ICD-10-CM

## 2024-03-25 DIAGNOSIS — Z80.8 FH: THYROID CANCER: ICD-10-CM

## 2024-03-25 DIAGNOSIS — S46.912D STRAIN OF LEFT SHOULDER, SUBSEQUENT ENCOUNTER: ICD-10-CM

## 2024-03-25 DIAGNOSIS — M85.89 OSTEOPENIA OF MULTIPLE SITES: ICD-10-CM

## 2024-03-25 DIAGNOSIS — K21.9 GASTROESOPHAGEAL REFLUX DISEASE WITHOUT ESOPHAGITIS: ICD-10-CM

## 2024-03-25 DIAGNOSIS — E78.49 OTHER HYPERLIPIDEMIA: ICD-10-CM

## 2024-03-25 DIAGNOSIS — G43.109 MIGRAINE WITH AURA AND WITHOUT STATUS MIGRAINOSUS, NOT INTRACTABLE: ICD-10-CM

## 2024-03-25 DIAGNOSIS — J30.1 CHRONIC SEASONAL ALLERGIC RHINITIS DUE TO POLLEN: ICD-10-CM

## 2024-03-25 DIAGNOSIS — Z00.00 WELL ADULT EXAM: Primary | ICD-10-CM

## 2024-03-25 LAB
OHS QRS DURATION: 90 MS
OHS QTC CALCULATION: 427 MS

## 2024-03-25 PROCEDURE — 93005 ELECTROCARDIOGRAM TRACING: CPT

## 2024-03-25 PROCEDURE — 3074F SYST BP LT 130 MM HG: CPT | Mod: CPTII,S$GLB,, | Performed by: PEDIATRICS

## 2024-03-25 PROCEDURE — 1160F RVW MEDS BY RX/DR IN RCRD: CPT | Mod: CPTII,S$GLB,, | Performed by: PEDIATRICS

## 2024-03-25 PROCEDURE — 3008F BODY MASS INDEX DOCD: CPT | Mod: CPTII,S$GLB,, | Performed by: PEDIATRICS

## 2024-03-25 PROCEDURE — 3078F DIAST BP <80 MM HG: CPT | Mod: CPTII,S$GLB,, | Performed by: PEDIATRICS

## 2024-03-25 PROCEDURE — 99999 PR PBB SHADOW E&M-EST. PATIENT-LVL V: CPT | Mod: PBBFAC,,, | Performed by: PEDIATRICS

## 2024-03-25 PROCEDURE — 1159F MED LIST DOCD IN RCRD: CPT | Mod: CPTII,S$GLB,, | Performed by: PEDIATRICS

## 2024-03-25 PROCEDURE — 99396 PREV VISIT EST AGE 40-64: CPT | Mod: S$GLB,,, | Performed by: PEDIATRICS

## 2024-03-25 PROCEDURE — 93010 ELECTROCARDIOGRAM REPORT: CPT | Mod: ,,, | Performed by: INTERNAL MEDICINE

## 2024-03-25 NOTE — PROGRESS NOTES
Subjective     Patient ID: Alexandra Rivas is a 53 y.o. female.    Chief Complaint: Follow-up    Alexandra Rivas is a 53 y.o. female who presents today for a follow up. She also complains about left shoulder pain. States PT helped in past but would like to see ortho. She also states she has some sharp chest pain randomly without exercise.     1)Migraine with aura: quiet on elavil only as needed. Improvement after menopause   2)FH: thyroid cancer: regular checking of TSH and US. US was benign discussed with pt.  3)FH: colon cancer: sister Dx with colon cancer at 51yo. Colonoscopy 5/22 que 10 years.  4)Vitamin D: on weekly D  5)Myofascial pain tender lower left shoulder: was in PT now doing home therapy with incomplete improvement. Also has left anterior chest wall pain. Does not worsen with exertion.   6)Gastritis/duodenitis/GERD: quiet on Prilosec   7)LIPIDS: following D&E    LABS REVIEWED AND DISCUSSED WITH PATIENT    PMHx, PSHx, SocHx, and FHx reviewed and discussed with patient.        Review of Systems   Constitutional:  Negative for chills and fever.   HENT:  Negative for nasal congestion and rhinorrhea.    Respiratory:  Negative for cough and shortness of breath.    Cardiovascular:  Negative for chest pain.   Gastrointestinal:  Negative for abdominal pain, blood in stool, change in bowel habit, constipation, diarrhea and nausea.   Endocrine: Negative for cold intolerance, heat intolerance, polydipsia, polyphagia and polyuria.   Genitourinary:  Negative for dysuria.   Neurological:  Negative for weakness.          Objective     Physical Exam  Constitutional:       General: She is not in acute distress.     Appearance: Normal appearance. She is normal weight. She is not ill-appearing or toxic-appearing.   Cardiovascular:      Rate and Rhythm: Normal rate and regular rhythm.      Pulses: Normal pulses.      Heart sounds: Normal heart sounds. No murmur heard.     No gallop.   Pulmonary:       Effort: Pulmonary effort is normal. No respiratory distress.      Breath sounds: Normal breath sounds. No stridor. No wheezing, rhonchi or rales.   Chest:      Chest wall: No tenderness.   Abdominal:      General: There is no distension.      Palpations: There is no mass.      Tenderness: There is no abdominal tenderness.      Hernia: No hernia is present.   Musculoskeletal:         General: Tenderness present.      Comments: Left pectoralis major tender. Palptation reproduces pain   Neurological:      General: No focal deficit present.      Mental Status: She is alert and oriented to person, place, and time. Mental status is at baseline.      Cranial Nerves: No cranial nerve deficit.      Motor: No weakness.      Gait: Gait normal.   Psychiatric:         Mood and Affect: Mood normal.         Behavior: Behavior normal.         Thought Content: Thought content normal.         Judgment: Judgment normal.            Assessment and Plan     1. Well adult exam  -     Lipid Panel; Future; Expected date: 03/25/2024  -     Comprehensive Metabolic Panel; Future; Expected date: 03/25/2024  -     CBC Auto Differential; Future; Expected date: 03/25/2024  -     Hemoglobin A1C; Future; Expected date: 03/25/2024  -     Lipid Panel; Future; Expected date: 03/25/2024  -     Comprehensive Metabolic Panel; Future; Expected date: 03/25/2024  -     CBC Auto Differential; Future; Expected date: 03/25/2024    2. Migraine with aura and without status migrainosus, not intractable    3. FH: thyroid cancer  -     TSH; Future; Expected date: 03/25/2024  -     US Thyroid; Future; Expected date: 03/25/2024  -     TSH; Future; Expected date: 03/25/2024    4. Vitamin D deficiency disease  -     Vitamin D; Future; Expected date: 03/25/2024  -     Vitamin D; Future; Expected date: 03/25/2024    5. Chronic seasonal allergic rhinitis due to pollen    6. Other hyperlipidemia    7. Gastroesophageal reflux disease without esophagitis    8. FH: colon  cancer in first degree relative <60 years old  Overview:  Sister at 51 yo.      9. Osteopenia of multiple sites    10. Strain of left shoulder, subsequent encounter  -     Ambulatory referral/consult to Orthopedics; Future; Expected date: 04/01/2024    11. Atypical chest pain  -     SCHEDULED EKG 12-LEAD (to Muse); Future        At goal for B/P. Labs today. Will refer to ortho. Will have EKG. Suspect the left chest pain is pectoralis major muscle strain related to the shoulder strain. Maintain meds, self monitoring D&E, weight moderation. F/U annually with labs.            Follow up in about 1 year (around 3/25/2025).

## 2024-03-28 DIAGNOSIS — M25.512 LEFT SHOULDER PAIN, UNSPECIFIED CHRONICITY: Primary | ICD-10-CM

## 2024-04-01 ENCOUNTER — OFFICE VISIT (OUTPATIENT)
Dept: SPORTS MEDICINE | Facility: CLINIC | Age: 54
End: 2024-04-01
Payer: COMMERCIAL

## 2024-04-01 ENCOUNTER — HOSPITAL ENCOUNTER (OUTPATIENT)
Dept: RADIOLOGY | Facility: HOSPITAL | Age: 54
Discharge: HOME | End: 2024-04-01
Attending: STUDENT IN AN ORGANIZED HEALTH CARE EDUCATION/TRAINING PROGRAM
Payer: COMMERCIAL

## 2024-04-01 VITALS — WEIGHT: 112.88 LBS | BODY MASS INDEX: 18.81 KG/M2 | HEIGHT: 65 IN

## 2024-04-01 DIAGNOSIS — M79.18 MYOFASCIAL PAIN SYNDROME OF THORACIC SPINE: Primary | ICD-10-CM

## 2024-04-01 DIAGNOSIS — M25.512 LEFT SHOULDER PAIN, UNSPECIFIED CHRONICITY: ICD-10-CM

## 2024-04-01 DIAGNOSIS — S46.912D STRAIN OF LEFT SHOULDER, SUBSEQUENT ENCOUNTER: ICD-10-CM

## 2024-04-01 PROCEDURE — 1159F MED LIST DOCD IN RCRD: CPT | Mod: CPTII,S$GLB,, | Performed by: STUDENT IN AN ORGANIZED HEALTH CARE EDUCATION/TRAINING PROGRAM

## 2024-04-01 PROCEDURE — 73030 X-RAY EXAM OF SHOULDER: CPT | Mod: 26,LT,, | Performed by: RADIOLOGY

## 2024-04-01 PROCEDURE — 3008F BODY MASS INDEX DOCD: CPT | Mod: CPTII,S$GLB,, | Performed by: STUDENT IN AN ORGANIZED HEALTH CARE EDUCATION/TRAINING PROGRAM

## 2024-04-01 PROCEDURE — 73030 X-RAY EXAM OF SHOULDER: CPT | Mod: TC,LT

## 2024-04-01 PROCEDURE — 99203 OFFICE O/P NEW LOW 30 MIN: CPT | Mod: S$GLB,,, | Performed by: STUDENT IN AN ORGANIZED HEALTH CARE EDUCATION/TRAINING PROGRAM

## 2024-04-01 PROCEDURE — 99999 PR PBB SHADOW E&M-EST. PATIENT-LVL V: CPT | Mod: PBBFAC,,, | Performed by: STUDENT IN AN ORGANIZED HEALTH CARE EDUCATION/TRAINING PROGRAM

## 2024-04-01 RX ORDER — ERGOCALCIFEROL 1.25 MG/1
50000 CAPSULE ORAL
Qty: 12 CAPSULE | Refills: 3 | Status: SHIPPED | OUTPATIENT
Start: 2024-04-01

## 2024-04-01 NOTE — PATIENT INSTRUCTIONS
Assessment:  Alexandra Rivas is a 53 y.o. female   Chief Complaint   Patient presents with    Left Shoulder - Pain       Encounter Diagnoses   Name Primary?    Strain of left shoulder, subsequent encounter     Myofascial pain syndrome of thoracic spine Yes        Plan:  Apply topical diclofenac (Voltaren) up to 4 times a day to the affected area.  It can be bought over the counter at any local pharmacy.    Please take Tylenol 1 Gram (2 extra-strength Tylenol tablets) up to 3 times a day.  Please to not take any extra doses of tylenol if you are scheduling in this manner.   Physical Therapy has been ordered for you today and the referred clinic should be reaching out in the next few days.  If you do not hear from them, please let us know.  Give at least 3-4 weeks of dry needling, if still not improving, please reach out in mychart.     Follow-up: AS needed or sooner if there are any problems between now and then.    Thank you for choosing [facility] and Dr. Michael Nixon for your orthopedic & sports medicine care. It is our goal to provide you with exceptional care that will help keep you healthy, active, and get you back in the game.    Please do not hesitate to reach out to us via email, phone, or MyChart with any questions, concerns, or feedback.    If you felt that you received exemplary care today, please consider leaving us feedback on Healthgrades at:  [email or web address]    If you are experiencing pain/discomfort ,or have questions after 5pm and would like to be connected to the [facility] on-call team, please call this number and specify which Sports Medicine provider is treating you: [phone]

## 2024-04-01 NOTE — TELEPHONE ENCOUNTER
Refill Routing Note   Medication(s) are not appropriate for processing by [facility] for the following reason(s):        Outside of protocol  Responsible provider unclear    [facility] action(s):  Route        Medication Therapy Plan: no pcp listed on profile; LOV 3/5/24      Appointments  past 12m or future 3m with PCP    Date Provider   Last Visit   3/25/2024 Tom Hendricks MD   Next Visit   Visit date not found Tom Hendricks MD   ED visits in past 90 days: 0        Note composed:5:44 AM 04/01/2024

## 2024-04-01 NOTE — PROGRESS NOTES
"        Patient ID: Alexandra Rivas  YOB: 1970  MRN: [id number]    Chief Complaint: Pain of the Left Shoulder      Referred By: Dr Hendricks for left scapular pain    History of Present Illness: Alexandra Rivas is a right-hand dominant 53 y.o. female who presents today with left shoulder and scapular pain.     The patient is active in none.  Occupation:  [employer] [place]    53-year-old female presenting today for chronic left shoulder and scapular pain.  Pain mostly over left upper trapezius and rhomboids.  Mostly feels it around the scapula with movement.  Denies any lifting fall injury trauma.  No pain over the shoulder joint or lateral upper arm.  Does note symptoms sometimes extend down in the fingers however feels more radicular-type.  No cervical injuries or surgeries in the past.  Symptoms come and go in the upper extremity related to the referred pain down into the hand.  Pain is worse with prolonged activity reaching overhead.  Do some physical therapy last fall with minimal to no improvement at the [facility].  She currently lives in [place].    Past Medical History:   Past Medical History:   Diagnosis Date    Allergic rhinitis     FH: thyroid cancer     Gastritis and duodenitis 4/19/2016    Headache, classical migraine     occasional aura "every now and then"    Vitamin D deficiency disease      Past Surgical History:   Procedure Laterality Date    COLONOSCOPY N/A 5/12/2022    Procedure: COLONOSCOPY;  Surgeon: Néstor Mitchell MD;  Location: [facility];  Service: Gastroenterology;  Laterality: N/A;    TONSILLECTOMY      TUBAL LIGATION       Family History   Problem Relation Age of Onset    Hypertension Mother     Psoriasis Mother     Aneurysm Father     Thyroid cancer Unknown         papillary, mother and sister 43y.    Hypertension Sister     Psoriasis Sister     Diabetes Paternal Grandmother     Psoriasis Maternal Grandmother     Breast cancer Neg Hx     " Colon cancer Neg Hx     Ovarian cancer Neg Hx     Melanoma Neg Hx     Lupus Neg Hx     Eczema Neg Hx     Thrombophilia Neg Hx      Social History     Socioeconomic History    Marital status:    Tobacco Use    Smoking status: Never    Smokeless tobacco: Never   Substance and Sexual Activity    Alcohol use: Yes     Alcohol/week: 0.0 standard drinks of alcohol     Comment: occasionally    Drug use: No    Sexual activity: Yes     Partners: Male     Birth control/protection: Surgical     Comment: BTL; mut monog   Other Topics Concern    Are you pregnant or think you may be? No    Breast-feeding No     Social Determinants of Health     Financial Resource Strain: Low Risk  (3/9/2023)    Overall Financial Resource Strain (CARDIA)     Difficulty of Paying Living Expenses: Not hard at all   Food Insecurity: No Food Insecurity (3/9/2023)    Hunger Vital Sign     Worried About Running Out of Food in the Last Year: Never true     Ran Out of Food in the Last Year: Never true   Transportation Needs: No Transportation Needs (3/9/2023)    PRAPARE - Transportation     Lack of Transportation (Medical): No     Lack of Transportation (Non-Medical): No   Physical Activity: Inactive (3/9/2023)    Exercise Vital Sign     Days of Exercise per Week: 0 days     Minutes of Exercise per Session: 0 min   Stress: No Stress Concern Present (3/9/2023)    [place] [place] [employer] - Occupational Stress Questionnaire     Feeling of Stress : Only a little   Social Connections: Unknown (3/9/2023)    Social Connection and Isolation Panel [NHANES]     Frequency of Communication with Friends and Family: More than three times a week     Frequency of Social Gatherings with Friends and Family: Once a week     Active Member of Clubs or Organizations: Yes     Attends Club or Organization Meetings: More than 4 times per year     Marital Status:    Housing Stability: Low Risk  (3/9/2023)    Housing Stability Vital Sign     Unable to  Pay for Housing in the Last Year: No     Number of Places Lived in the Last Year: 1     Unstable Housing in the Last Year: No     Medication List with Changes/Refills   Current Medications    AMITRIPTYLINE (ELAVIL) 10 MG TABLET    Take 1 tablet (10 mg total) by mouth nightly as needed for Insomnia.    CLOBETASOL (TEMOVATE) 0.05 % EXTERNAL SOLUTION    Apply to affected areas of scalp 1-2 times daily as needed for itch    ERGOCALCIFEROL (ERGOCALCIFEROL) 50,000 UNIT CAP    Take 1 capsule by mouth once a week    ESTRADIOL (ESTRACE) 0.01 % (0.1 MG/GRAM) VAGINAL CREAM    Place 1 g vaginally twice a week.    IBANDRONATE (BONIVA) 150 MG TABLET    Take 150 mg by mouth every 30 days.    KETOCONAZOLE (NIZORAL) 2 % SHAMPOO    Wash hair with medicated shampoo at least 1x/week - let sit on scalp at least 5 minutes prior to rinsing    OMEPRAZOLE (PRILOSEC) 40 MG CAPSULE    Take 1 capsule (40 mg total) by mouth once daily.     Review of patient's allergies indicates:   Allergen Reactions    Oxycodone-acetaminophen Itching       Physical Exam:   Body mass index is 18.78 kg/m².    GENERAL: Well appearing, in no acute distress.  HEAD: Normocephalic and atraumatic.  ENT: External ears and nose grossly normal.  EYES: EOMI bilaterally  PULMONARY: Respirations are grossly even and non-labored.  NEURO: Awake, alert, and oriented x 3.  SKIN: No obvious rashes appreciated.  PSYCH: Mood & affect are appropriate.    Detailed MSK exam:     Tenderness in multiple trigger points appreciated in the upper trapezius rhomboids.  No significant scapular dyskinesis appreciated full range of motion the shoulder in flexion abduction internal external rotation good strength in all ranges of motion as well.  Positive full can for reproduction of scapular pain negative empty can good strength no signs of impingement negative Speed's negative [place]'s.  No tenderness over the anterior posterior glenohumeral joint.  Tenderness over the AC joint.  Some pain  with scapular retraction    Imaging:  X-Ray Shoulder 2 or More Views Left  Narrative: EXAM:  XR SHOULDER COMPLETE 2 OR MORE VIEWS LEFT    INDICATIONS:  Left shoulder pain    TECHNIQUE:  4 total views of the left shoulder    COMPARISONS:  None available.    FINDINGS:  No fractures, no degenerative changes, no joint space loss and no soft tissue calcifications.  Left clavicle, left upper ribs and left upper lung are normal.  Impression:  Normal left shoulder and adjacent structures    Finalized on: 4/1/2024 10:26 AM By:  Hollis Heaton MD  [facility]# [id number]      2024-04-01 10:28:[id number]    [facility]      Relevant imaging results were reviewed and interpreted by me and per my read as above.  This was discussed with the patient and / or family today.     Assessment:  Alexandra Rivas is a 53 y.o. female presents today for myofascial pain of thoracic and cervical spine.  Most of her pain is myofascial in nature, discussed the role of dry needling manual work and continued physical therapy.  She is open to doing this we will send her closer to home in [place] at [facility]. referral sent today.  Doing well otherwise with pain management with ibuprofen consider muscle relaxant versus topicals versus prescription anti-inflammatories in the future as needed.    Myofascial pain syndrome of thoracic spine  -     Cancel: Ambulatory referral/consult to Physical/Occupational Therapy; Future; Expected date: 04/08/2024  -     Ambulatory referral/consult to Physical/Occupational Therapy; Future; Expected date: 04/08/2024    Strain of left shoulder, subsequent encounter  -     Ambulatory referral/consult to Orthopedics  -     Cancel: Ambulatory referral/consult to Physical/Occupational Therapy; Future; Expected date: 04/08/2024  -     Ambulatory referral/consult to Physical/Occupational Therapy; Future; Expected date: 04/08/2024         A copy of today's visit note has been sent to the referring provider.       Michael Nixon  MD    Disclaimer: This note was prepared using a voice recognition system and is likely to have sound alike errors within the text.

## 2024-09-23 ENCOUNTER — TELEPHONE (OUTPATIENT)
Dept: INTERNAL MEDICINE | Facility: CLINIC | Age: 54
End: 2024-09-23
Payer: COMMERCIAL

## 2024-09-23 NOTE — TELEPHONE ENCOUNTER
----- Message from Jam Verdugo MA sent at 9/23/2024  2:26 PM CDT -----  Contact: Alexandra @ [phone]  The patient calling to ask staff to fax lab orders over to [facility]. Says her insurance will cover it there. Please give her a call back at [phone].

## 2024-10-16 ENCOUNTER — HOSPITAL ENCOUNTER (OUTPATIENT)
Dept: RADIOLOGY | Facility: HOSPITAL | Age: 54
Discharge: HOME | End: 2024-10-16
Attending: PEDIATRICS
Payer: COMMERCIAL

## 2024-10-16 DIAGNOSIS — Z80.8 FH: THYROID CANCER: ICD-10-CM

## 2024-10-16 PROCEDURE — 76536 US EXAM OF HEAD AND NECK: CPT | Mod: TC

## 2024-10-16 PROCEDURE — 76536 US EXAM OF HEAD AND NECK: CPT | Mod: 26,,, | Performed by: STUDENT IN AN ORGANIZED HEALTH CARE EDUCATION/TRAINING PROGRAM

## 2024-10-23 ENCOUNTER — OFFICE VISIT (OUTPATIENT)
Dept: INTERNAL MEDICINE | Facility: CLINIC | Age: 54
End: 2024-10-23
Payer: COMMERCIAL

## 2024-10-23 VITALS
WEIGHT: 112.63 LBS | SYSTOLIC BLOOD PRESSURE: 120 MMHG | DIASTOLIC BLOOD PRESSURE: 72 MMHG | TEMPERATURE: 98 F | HEART RATE: 89 BPM | BODY MASS INDEX: 18.77 KG/M2 | OXYGEN SATURATION: 99 % | HEIGHT: 65 IN | RESPIRATION RATE: 17 BRPM

## 2024-10-23 DIAGNOSIS — E04.1 THYROID CYST: ICD-10-CM

## 2024-10-23 DIAGNOSIS — E78.49 OTHER HYPERLIPIDEMIA: ICD-10-CM

## 2024-10-23 DIAGNOSIS — R73.03 PRE-DIABETES: ICD-10-CM

## 2024-10-23 DIAGNOSIS — Z71.89 CARDIAC RISK COUNSELING: Primary | ICD-10-CM

## 2024-10-23 PROCEDURE — 3044F HG A1C LEVEL LT 7.0%: CPT | Mod: CPTII,S$GLB,, | Performed by: PEDIATRICS

## 2024-10-23 PROCEDURE — 99999 PR PBB SHADOW E&M-EST. PATIENT-LVL V: CPT | Mod: PBBFAC,,, | Performed by: PEDIATRICS

## 2024-10-23 PROCEDURE — 3074F SYST BP LT 130 MM HG: CPT | Mod: CPTII,S$GLB,, | Performed by: PEDIATRICS

## 2024-10-23 PROCEDURE — 1160F RVW MEDS BY RX/DR IN RCRD: CPT | Mod: CPTII,S$GLB,, | Performed by: PEDIATRICS

## 2024-10-23 PROCEDURE — 1159F MED LIST DOCD IN RCRD: CPT | Mod: CPTII,S$GLB,, | Performed by: PEDIATRICS

## 2024-10-23 PROCEDURE — 3008F BODY MASS INDEX DOCD: CPT | Mod: CPTII,S$GLB,, | Performed by: PEDIATRICS

## 2024-10-23 PROCEDURE — G2211 COMPLEX E/M VISIT ADD ON: HCPCS | Mod: S$GLB,,, | Performed by: PEDIATRICS

## 2024-10-23 PROCEDURE — 99214 OFFICE O/P EST MOD 30 MIN: CPT | Mod: S$GLB,,, | Performed by: PEDIATRICS

## 2024-10-23 PROCEDURE — 3078F DIAST BP <80 MM HG: CPT | Mod: CPTII,S$GLB,, | Performed by: PEDIATRICS

## 2024-10-23 NOTE — LETTER
October 23, 2024      The [address]  [address] [facility]  [place] [address]  Phone: [phone]  Fax: [phone]       Patient: Alexandra Rivas   YOB: 1970  Date of Visit: 10/23/2024    To Whom It May Concern:    Mike Rivas  was at [facility] on 10/23/2024. The patient may return to work/school on 10/28/2024 with no restrictions. If you have any questions or concerns, or if I can be of further assistance, please do not hesitate to contact me.    Sincerely,    Elen Bolanos MA

## 2024-10-23 NOTE — PROGRESS NOTES
Patient ID: Alexandra Rivas is a 54 y.o. female.    Chief Complaint: Follow-up (Results /)    History of Present Illness    CHIEF COMPLAINT:  Patient presents today for follow-up on labs and concerns about thyroid test.    LAB RESULTS:  She reports her thyroid test is normal. 2 mm thyorid nodule on latest US, below bx threshold. Blood count is normal with no anemia. Vitamin D level has improved to 49 from a previously low level. Blood chemistry results, including electrolytes, kidney function, and liver tests, are normal. Cholesterol results show a slight elevation in total cholesterol. HDL cholesterol is 73. Triglycerides are 64. LDL cholesterol is slightly elevated at 153, increased from a previous reading of 132. A1C test results are in the prediabetes range, showing an increase from 5.4 two years ago, but not yet in the diabetic range.    CARDIOVASCULAR CONCERNS:  She reports experiencing occasional chest pain with exertion. She denies chest discomfort at rest or while watching television. She also denies any coordination issues, facial drooping, or speech difficulties.    THYROID:  A recent thyroid ultrasound revealed a 2mm nodule. She expresses concern about potential growth of the nodule compared to previous imaging, but acknowledges it remains below the threshold for further intervention.    PMH, PSH, SH, FH reviewed with patient.      ROS:  General: -fever, -chills, -fatigue, -weight gain, -weight loss  Eyes: -vision changes, -redness, -discharge  ENT: -ear pain, -nasal congestion, -sore throat  Cardiovascular: +chest pain, -palpitations, -lower extremity edema, -chest pressure  Respiratory: -cough, -shortness of breath  Gastrointestinal: -abdominal pain, -nausea, -vomiting, -diarrhea, -constipation, -blood in stool  Genitourinary: -dysuria, -hematuria, -frequency  Musculoskeletal: -joint pain, -muscle pain  Skin: -rash, -lesion  Neurological: -headache, -dizziness, -numbness,  -tingling  Psychiatric: -anxiety, -depression, -sleep difficulty         Exam:  Physical Exam    General: No acute distress. Well-developed. Well-nourished.  Eyes: EOMI. Sclerae anicteric.  HENT: Normocephalic. Atraumatic. Nares patent. Moist oral mucosa.  Cardiovascular: Regular rate. Regular rhythm. No murmurs. No rubs. No gallops. Normal S1, S2.  Respiratory: Normal respiratory effort. Clear to auscultation bilaterally. No rales. No rhonchi. No wheezing.  Abdomen: Soft. Non-tender. Non-distended. Normoactive bowel sounds.  Musculoskeletal: No  obvious deformity.  Extremities: No lower extremity edema.  Neurological: Alert & oriented x3. No slurred speech. Normal gait.  Psychiatric: Normal mood. Normal affect. Good insight. Good judgment.  Skin: Warm. Dry. No rash.         Assessment/Plan:  Cardiac risk counseling  -     Ambulatory referral/consult to Cardiology; Future; Expected date: 10/30/2024    Other hyperlipidemia  -     Comprehensive Metabolic Panel; Future; Expected date: 10/23/2024  -     Lipid Panel; Future; Expected date: 10/23/2024    Pre-diabetes  -     Hemoglobin A1C; Future; Expected date: 10/23/2024    Thyroid cyst  -     US Thyroid; Future; Expected date: 10/23/2024  -     Cancel: TSH; Future; Expected date: 10/23/2024  -     TSH; Future; Expected date: 10/23/2024         Assessment & Plan    Assessed thyroid function, blood count, vitamin D, blood chemistry, kidney function, and liver tests - all within normal limits  Evaluated cholesterol profile: total cholesterol slightly elevated, HDL 73, triglycerides 64,  (up from 132)  Noted A1C in prediabetes range at 5.7, increased from 5.4 2 years ago  Calculated ASCVD 10-year risk score of 2%, below the 7% threshold for recommending statin therapy  Considered starting cholesterol medication but deemed unnecessary at this time given risk profile  Evaluated reported chest pain, likely muscular in origin based on previous exam    PREDIABETES:  -  Advised the patient to monitor carbohydrate intake, including sugars, bread, pasta, rice, and potatoes, to manage prediabetes.  - Recommend lifestyle modifications, including diet and exercise, for prediabetes management.  - Explained A1C as a screening method for diabetes, differentiating between prediabetes and diabetes ranges.  - Ordered lab work including A1C to be completed at [facility] prior to next visit.  - Noted that the patient's A1C has increased from 5.4 two years ago to a level in the prediabetes range.  - Recorded current blood glucose level at 82.  - Discussed prediabetes management options, including medication (Metformin) and lifestyle modifications.  - Recommend lifestyle modification over medication at this point, focusing on carbohydrate intake and regular exercise.  - Scheduled A1C recheck in 6 months.    THYROID NODULE:  - Reviewed thyroid ultrasound showing a 2 mm nodule, which is below the threshold for concern.  - Noted normal thyroid test results.  - Assessed that the 2 mm thyroid nodule is too small to require intervention at this time.  - Planned to repeat thyroid ultrasound at the next visit to monitor nodule size.    HYPERLIPIDEMIA:  - Ordered lab work including cholesterol panel to be completed at [facility] prior to next visit.  - Noted total cholesterol is slightly high, with LDL cholesterol at 153 (up from 132 previously), HDL at 73, and triglycerides at 64.  - Evaluated LDL cholesterol as slightly elevated at 153, above the desired level of less than 130.  - Recommend lifestyle modifications over starting cholesterol medication at this time, but offered medication as an option if the patient prefers.  - Planned to recheck cholesterol levels in 6 months.    CARDIOVASCULAR RISK ASSESSMENT:  - Calculated Atherosclerotic Cardiovascular Disease (ASCVD) 10-year risk score at 2%, which is slightly higher than expected for a 54-year-old woman but below the 7% threshold for medication.  -  Assessed that cholesterol medication is not absolutely necessary at this time based on the risk score and current guidelines.  - Suggested a cardiology referral for further evaluation if the patient is concerned.  - Discussed ASCVD risk score and its implications for treatment decisions.  - Provided information on symptoms of myocardial infarction and cerebrovascular accident, emphasizing importance of immediate medical attention.  - Evaluated risk score of 2% as slightly higher than expected for a 54-year-old woman, but below the 7% threshold for medication intervention.  - Discussed cardiovascular risk and screening options with the patient, including the possibility of further cardiac evaluation.    CHEST PAIN:  - Referred to cardiologist for further cardiac risk assessment and evaluation of reported chest pain.    WEIGHT MANAGEMENT:  - Patient to maintain current weight.    FOLLOW UP:  - Recommend minimizing carbohydrate intake, including bread, pasta, rice, and potatoes.  - Patient to continue regular exercise routine.    - Follow up in 6 months.  - Complete lab work at [facility] a few days before next appointment.          Visit today included increased complexity associated with the care of the episodic problem  addressed and managing the longitudinal care of the patient due to the serious and/or complex managed problem(s) .      Follow up in about 6 months (around 4/23/2025).    This note was generated with the assistance of ambient listening technology. Verbal consent was obtained by the patient and accompanying visitor(s) for the recording of patient appointment to facilitate this note. I attest to having reviewed and edited the generated note for accuracy, though some syntax or spelling errors may persist. Please contact the author of this note for any clarification.

## 2024-11-03 PROBLEM — R07.89 ATYPICAL CHEST PAIN: Status: ACTIVE | Noted: 2024-11-03

## 2024-11-03 NOTE — PROGRESS NOTES
"Subjective   Patient ID:  Alexandra Rivas is a 54 y.o. female who presents for evaluation of Chest Pain      Pt referred by Dr. Cuong Hendricks      HPI  Pt presents for CP.  Pt has pre-DM, hyperlipidemia.  Nonsmoker.  Pt saw PCP recently and reported CP w exertion.  Referred to Cards.  Has left sided CP, not acute but more chronic.  Atypical.  Nonexertional.  Short  lasting, a minute or so.  At random, 3 - 4 times/month.  No associated sxs.  Ecg 3/25/24 personally reviewed: NSR, RV conduction delay; no ischemia or infarct noted.  - stress echo in 2016.  Lipids elevated for years.  10 year ASCVD risk is very low.  PCP watching it.          Past Medical History:   Diagnosis Date    Allergic rhinitis     FH: thyroid cancer     Gastritis and duodenitis 4/19/2016    Headache, classical migraine     occasional aura "every now and then"    Vitamin D deficiency disease        Current Outpatient Medications:     ALAHIST CF 2-10-20 mg Tab, Take 1 tablet by mouth 4 (four) times daily., Disp: , Rfl:     amitriptyline (ELAVIL) 10 MG tablet, Take 1 tablet (10 mg total) by mouth nightly as needed for Insomnia., Disp: 90 tablet, Rfl: 3    clobetasoL (TEMOVATE) 0.05 % external solution, Apply to affected areas of scalp 1-2 times daily as needed for itch, Disp: 50 mL, Rfl: 3    ergocalciferol (ERGOCALCIFEROL) 50,000 unit Cap, Take 1 capsule by mouth once a week, Disp: 12 capsule, Rfl: 3    fluticasone propionate (FLONASE) 50 mcg/actuation nasal spray, 1 spray in each nostril Nasally Twice a day for 30 days, Disp: , Rfl:     ibandronate (BONIVA) 150 mg tablet, Take 150 mg by mouth every 30 days., Disp: , Rfl:     ketoconazole (NIZORAL) 2 % shampoo, Wash hair with medicated shampoo at least 1x/week - let sit on scalp at least 5 minutes prior to rinsing, Disp: 120 mL, Rfl: 5    omeprazole (PRILOSEC) 40 MG capsule, Take 1 capsule (40 mg total) by mouth once daily., Disp: 90 capsule, Rfl: 3    estradioL (ESTRACE) 0.01 % (0.1 " mg/gram) vaginal cream, Place 1 g vaginally twice a week., Disp: , Rfl:       Review of Systems   Constitutional: Negative.   HENT: Negative.     Eyes: Negative.    Cardiovascular:  Positive for chest pain.   Respiratory: Negative.     Endocrine: Negative.    Hematologic/Lymphatic: Negative.    Skin: Negative.    Musculoskeletal: Negative.    Gastrointestinal: Negative.    Genitourinary: Negative.    Neurological: Negative.    Psychiatric/Behavioral: Negative.     Allergic/Immunologic: Negative.        /60 (BP Location: Left arm, Patient Position: Sitting)   Pulse 85   Wt 51.4 kg (113 lb 5.1 oz)   LMP 12/23/2017 (Approximate)   SpO2 99%   BMI 18.86 kg/m²       Wt Readings from Last 3 Encounters:   11/04/24 51.4 kg (113 lb 5.1 oz)   10/23/24 51.1 kg (112 lb 10.5 oz)   04/01/24 51.2 kg (112 lb 14 oz)     Temp Readings from Last 3 Encounters:   10/23/24 97.9 °F (36.6 °C) (Tympanic)   03/25/24 97.4 °F (36.3 °C) (Tympanic)   09/25/23 96.2 °F (35.7 °C) (Tympanic)     BP Readings from Last 3 Encounters:   11/04/24 100/60   10/23/24 120/72   03/25/24 118/76     Pulse Readings from Last 3 Encounters:   11/04/24 85   10/23/24 89   03/25/24 70            Objective     Physical Exam  Vitals and nursing note reviewed.   Constitutional:       General: She is not in acute distress.     Appearance: Normal appearance. She is well-developed. She is not ill-appearing or diaphoretic.   HENT:      Head: Normocephalic.   Neck:      Thyroid: No thyromegaly.      Vascular: Normal carotid pulses. No carotid bruit, hepatojugular reflux or JVD.   Cardiovascular:      Rate and Rhythm: Normal rate and regular rhythm.      Chest Wall: PMI is not displaced.      Pulses: Normal pulses.           Radial pulses are 2+ on the right side and 2+ on the left side.      Heart sounds: Normal heart sounds, S1 normal and S2 normal. No murmur heard.     No friction rub. No gallop.   Pulmonary:      Effort: Pulmonary effort is normal.      Breath  sounds: Normal breath sounds. No wheezing or rales.   Abdominal:      General: Bowel sounds are normal. There is no abdominal bruit.      Palpations: Abdomen is soft. There is no hepatomegaly, splenomegaly or mass.      Tenderness: There is no abdominal tenderness.   Musculoskeletal:      Cervical back: Neck supple.   Lymphadenopathy:      Cervical: No cervical adenopathy.   Skin:     General: Skin is warm.   Neurological:      Mental Status: She is alert and oriented to person, place, and time.   Psychiatric:         Behavior: Behavior normal. Behavior is cooperative.         I have reviewed all pertinent labs and cardiac studies.          Chemistry        Component Value Date/Time     10/16/2024 1207    K 4.1 10/16/2024 1207     10/16/2024 1207    CO2 24 10/16/2024 1207    BUN 10 10/16/2024 1207    CREATININE 0.78 10/16/2024 1207    GLU 82 10/16/2024 1207        Component Value Date/Time    CALCIUM 9.6 10/16/2024 1207    ALKPHOS 56 03/25/2024 1020    AST 14 10/16/2024 1207    ALT 6 10/16/2024 1207    BILITOT 1.2 10/16/2024 1207    ESTGFRAFRICA >60.0 01/31/2022 1537    EGFRNONAA >60.0 01/31/2022 1537        Lab Results   Component Value Date    WBC 5.2 10/16/2024    HGB 12.7 10/16/2024    HCT 39.4 10/16/2024    MCV 85 10/16/2024     10/16/2024       Lab Results   Component Value Date    HGBA1C 5.9 (H) 10/16/2024       Lab Results   Component Value Date    CHOL 237 (H) 10/16/2024    CHOL 248 (H) 03/25/2024    CHOL 213 (H) 03/10/2023     Lab Results   Component Value Date    HDL 73 10/16/2024    HDL 66 03/25/2024    HDL 71 03/10/2023     Lab Results   Component Value Date    LDLCALC 153 (H) 10/16/2024    LDLCALC 167.8 (H) 03/25/2024    LDLCALC 132 (H) 03/10/2023     Lab Results   Component Value Date    TRIG 64 10/16/2024    TRIG 71 03/25/2024    TRIG 57 03/10/2023       Lab Results   Component Value Date    CHOLHDL 26.6 03/25/2024    CHOLHDL 30.9 05/14/2019    CHOLHDL 27.8 03/14/2018             Assessment and Plan     1. Atypical chest pain    2. Pre-diabetes    3. Other hyperlipidemia    4. Cardiac risk counseling    5. Abnormal ECG        Plan:      Atypical CP: not acute.  Seems noncardiac but does have some CV risk factors.  Stress echocardiogram.  CXR pa/lateral.  Consider LHC if needed should stress echo reveal coronary ischemia.  Risks/benefits discussed.  CV risk factor modification discussed.  Lipids: low cholesterol diet.  Consider statin tx in future.  Pre-DM: optimal HGAIC control advised.  Abnl ecg: Benign abnl. Monitor.  Consider coronary calcium score test in future.  Cardiac diet.  Exercise daily w goal 30 minutes.    PHONE REVIEW.          I have reviewed all pertinent labs and cardiac studies independently. Plans and recommendations have been formulated under my direct supervision. All questions answered and patient voiced understanding.

## 2024-11-04 ENCOUNTER — OFFICE VISIT (OUTPATIENT)
Dept: CARDIOLOGY | Facility: CLINIC | Age: 54
End: 2024-11-04
Payer: COMMERCIAL

## 2024-11-04 VITALS
OXYGEN SATURATION: 99 % | DIASTOLIC BLOOD PRESSURE: 60 MMHG | HEART RATE: 85 BPM | WEIGHT: 113.31 LBS | BODY MASS INDEX: 18.86 KG/M2 | SYSTOLIC BLOOD PRESSURE: 100 MMHG

## 2024-11-04 DIAGNOSIS — E78.49 OTHER HYPERLIPIDEMIA: ICD-10-CM

## 2024-11-04 DIAGNOSIS — Z71.89 CARDIAC RISK COUNSELING: ICD-10-CM

## 2024-11-04 DIAGNOSIS — R73.03 PRE-DIABETES: ICD-10-CM

## 2024-11-04 DIAGNOSIS — R94.31 ABNORMAL ECG: ICD-10-CM

## 2024-11-04 DIAGNOSIS — R07.89 ATYPICAL CHEST PAIN: Primary | ICD-10-CM

## 2024-11-04 PROCEDURE — 1159F MED LIST DOCD IN RCRD: CPT | Mod: CPTII,S$GLB,, | Performed by: INTERNAL MEDICINE

## 2024-11-04 PROCEDURE — 1160F RVW MEDS BY RX/DR IN RCRD: CPT | Mod: CPTII,S$GLB,, | Performed by: INTERNAL MEDICINE

## 2024-11-04 PROCEDURE — 3078F DIAST BP <80 MM HG: CPT | Mod: CPTII,S$GLB,, | Performed by: INTERNAL MEDICINE

## 2024-11-04 PROCEDURE — 99205 OFFICE O/P NEW HI 60 MIN: CPT | Mod: S$GLB,,, | Performed by: INTERNAL MEDICINE

## 2024-11-04 PROCEDURE — 3044F HG A1C LEVEL LT 7.0%: CPT | Mod: CPTII,S$GLB,, | Performed by: INTERNAL MEDICINE

## 2024-11-04 PROCEDURE — 3074F SYST BP LT 130 MM HG: CPT | Mod: CPTII,S$GLB,, | Performed by: INTERNAL MEDICINE

## 2024-11-04 PROCEDURE — 3008F BODY MASS INDEX DOCD: CPT | Mod: CPTII,S$GLB,, | Performed by: INTERNAL MEDICINE

## 2024-11-04 PROCEDURE — 99999 PR PBB SHADOW E&M-EST. PATIENT-LVL IV: CPT | Mod: PBBFAC,,, | Performed by: INTERNAL MEDICINE

## 2024-11-04 RX ORDER — FLUTICASONE PROPIONATE 50 MCG
SPRAY, SUSPENSION (ML) NASAL
COMMUNITY

## 2024-11-04 RX ORDER — DEXBROMPHENIRAMINE MALEATE, DEXTROMETHORPHAN HBR, PHENYLEPHRINE HCL 2; 20; 10 G/1; G/1; G/1
1 TABLET ORAL 4 TIMES DAILY
COMMUNITY
Start: 2024-07-25

## 2024-11-05 ENCOUNTER — HOSPITAL ENCOUNTER (OUTPATIENT)
Dept: RADIOLOGY | Facility: HOSPITAL | Age: 54
Discharge: HOME | End: 2024-11-05
Attending: INTERNAL MEDICINE
Payer: COMMERCIAL

## 2024-11-05 ENCOUNTER — HOSPITAL ENCOUNTER (OUTPATIENT)
Dept: CARDIOLOGY | Facility: HOSPITAL | Age: 54
Discharge: HOME | End: 2024-11-05
Attending: INTERNAL MEDICINE
Payer: COMMERCIAL

## 2024-11-05 VITALS
DIASTOLIC BLOOD PRESSURE: 60 MMHG | WEIGHT: 113 LBS | HEIGHT: 65 IN | SYSTOLIC BLOOD PRESSURE: 100 MMHG | BODY MASS INDEX: 18.83 KG/M2

## 2024-11-05 DIAGNOSIS — R94.31 ABNORMAL ECG: ICD-10-CM

## 2024-11-05 DIAGNOSIS — R73.03 PRE-DIABETES: ICD-10-CM

## 2024-11-05 DIAGNOSIS — R07.89 ATYPICAL CHEST PAIN: ICD-10-CM

## 2024-11-05 LAB
AORTIC ROOT ANNULUS: 3.11 CM
ASCENDING AORTA: 2.96 CM
AV INDEX (PROSTH): 0.74
AV MEAN GRADIENT: 2.2 MMHG
AV PEAK GRADIENT: 4 MMHG
AV VALVE AREA BY VELOCITY RATIO: 2.5 CM²
AV VALVE AREA: 2.3 CM²
AV VELOCITY RATIO: 0.8
BSA FOR ECHO PROCEDURE: 1.53 M2
CV ECHO LV RWT: 0.4 CM
CV STRESS BASE HR: 67 BPM
DIASTOLIC BLOOD PRESSURE: 82 MMHG
DOP CALC AO PEAK VEL: 1 M/S
DOP CALC AO VTI: 19.8 CM
DOP CALC LVOT AREA: 3.1 CM2
DOP CALC LVOT DIAMETER: 2 CM
DOP CALC LVOT PEAK VEL: 0.8 M/S
DOP CALC LVOT STROKE VOLUME: 45.8 CM3
DOP CALC RVOT PEAK VEL: 0.52 M/S
DOP CALC RVOT VTI: 11 CM
DOP CALCLVOT PEAK VEL VTI: 14.6 CM
ECHO LV POSTERIOR WALL: 0.7 CM (ref 0.6–1.1)
FRACTIONAL SHORTENING: 34.3 % (ref 28–44)
INTERVENTRICULAR SEPTUM: 0.8 CM (ref 0.6–1.1)
IVC DIAMETER: 1.17 CM
LA MAJOR: 2.55 CM
LA MINOR: 2.77 CM
LA WIDTH: 2.2 CM
LEFT ATRIUM AREA SYSTOLIC (APICAL 2 CHAMBER): 7.76 CM2
LEFT ATRIUM AREA SYSTOLIC (APICAL 4 CHAMBER): 10.87 CM2
LEFT ATRIUM SIZE: 1.72 CM
LEFT ATRIUM VOLUME INDEX MOD: 12.3 ML/M2
LEFT ATRIUM VOLUME INDEX: 5.5 ML/M2
LEFT ATRIUM VOLUME MOD: 19.05 ML
LEFT ATRIUM VOLUME: 8.54 CM3
LEFT INTERNAL DIMENSION IN SYSTOLE: 2.3 CM (ref 2.1–4)
LEFT VENTRICLE DIASTOLIC VOLUME INDEX: 32.63 ML/M2
LEFT VENTRICLE DIASTOLIC VOLUME: 50.58 ML
LEFT VENTRICLE END SYSTOLIC VOLUME APICAL 2 CHAMBER: 14.43 ML
LEFT VENTRICLE END SYSTOLIC VOLUME APICAL 4 CHAMBER: 21.73 ML
LEFT VENTRICLE MASS INDEX: 44.5 G/M2
LEFT VENTRICLE SYSTOLIC VOLUME INDEX: 11.2 ML/M2
LEFT VENTRICLE SYSTOLIC VOLUME: 17.4 ML
LEFT VENTRICULAR INTERNAL DIMENSION IN DIASTOLE: 3.5 CM (ref 3.5–6)
LEFT VENTRICULAR MASS: 68.9 G
LVED V (TEICH): 50.58 ML
LVES V (TEICH): 17.4 ML
LVOT MG: 1.22 MMHG
LVOT MV: 0.52 CM/S
OHS CV CPX 1 MINUTE RECOVERY HEART RATE: 125 BPM
OHS CV CPX 85 PERCENT MAX PREDICTED HEART RATE MALE: 141
OHS CV CPX ESTIMATED METS: 7
OHS CV CPX MAX PREDICTED HEART RATE: 166
OHS CV CPX PATIENT IS FEMALE: 1
OHS CV CPX PATIENT IS MALE: 0
OHS CV CPX PEAK DIASTOLIC BLOOD PRESSURE: 117 MMHG
OHS CV CPX PEAK HEAR RATE: 166 BPM
OHS CV CPX PEAK RATE PRESSURE PRODUCT: NORMAL
OHS CV CPX PEAK SYSTOLIC BLOOD PRESSURE: 201 MMHG
OHS CV CPX PERCENT MAX PREDICTED HEART RATE ACHIEVED: 105
OHS CV CPX RATE PRESSURE PRODUCT PRESENTING: 8040
PISA TR MAX VEL: 2.2 M/S
PV MEAN GRADIENT: 1 MMHG
PV PEAK GRADIENT: 2 MMHG
PV PEAK VELOCITY: 0.65 M/S
RA MAJOR: 2.79 CM
RA PRESSURE ESTIMATED: 3 MMHG
RA WIDTH: 1.86 CM
RIGHT VENTRICULAR END-DIASTOLIC DIMENSION: 2.74 CM
RV TB RVSP: 5 MMHG
SINUS: 3 CM
STJ: 2.33 CM
STRESS ECHO POST EXERCISE DUR MIN: 6 MINUTES
STRESS ECHO POST EXERCISE DUR SEC: 10 SECONDS
SYSTOLIC BLOOD PRESSURE: 120 MMHG
TDI LATERAL: 0.11 M/S
TDI SEPTAL: 0.1 M/S
TDI: 0.11 M/S
TR MAX PG: 19 MMHG
TRICUSPID ANNULAR PLANE SYSTOLIC EXCURSION: 1.9 CM
TV REST PULMONARY ARTERY PRESSURE: 22 MMHG
Z-SCORE OF LEFT VENTRICULAR DIMENSION IN END DIASTOLE: -2.48
Z-SCORE OF LEFT VENTRICULAR DIMENSION IN END SYSTOLE: -1.51

## 2024-11-05 PROCEDURE — 71046 X-RAY EXAM CHEST 2 VIEWS: CPT | Mod: 26,,, | Performed by: RADIOLOGY

## 2024-11-05 PROCEDURE — 93351 STRESS TTE COMPLETE: CPT | Mod: 26,,, | Performed by: INTERNAL MEDICINE

## 2024-11-05 PROCEDURE — 93351 STRESS TTE COMPLETE: CPT

## 2024-11-05 PROCEDURE — 93325 DOPPLER ECHO COLOR FLOW MAPG: CPT | Mod: 26,,, | Performed by: INTERNAL MEDICINE

## 2024-11-05 PROCEDURE — 71046 X-RAY EXAM CHEST 2 VIEWS: CPT | Mod: TC

## 2024-11-05 PROCEDURE — 93320 DOPPLER ECHO COMPLETE: CPT | Mod: 26,,, | Performed by: INTERNAL MEDICINE

## 2025-03-08 NOTE — TELEPHONE ENCOUNTER
No care due was identified.  [facility] Embedded Care Due Messages. Reference number: [id number].   3/08/2025 5:42:29 AM CST

## 2025-03-10 NOTE — TELEPHONE ENCOUNTER
Refill Routing Note   Medication(s) are not appropriate for processing by [facility] for the following reason(s):        Outside of protocol    [facility] action(s):  Route               Appointments  past 12m or future 3m with PCP    Date Provider   Last Visit   10/23/2024 Tom Hendricks MD   Next Visit   3/25/2025 Tom Hendricks MD   ED visits in past 90 days: 0        Note composed:7:46 AM 03/10/2025

## 2025-03-11 RX ORDER — ERGOCALCIFEROL 1.25 MG/1
50000 CAPSULE ORAL
Qty: 12 CAPSULE | Refills: 3 | Status: SHIPPED | OUTPATIENT
Start: 2025-03-11

## 2025-03-25 ENCOUNTER — OFFICE VISIT (OUTPATIENT)
Dept: INTERNAL MEDICINE | Facility: CLINIC | Age: 55
End: 2025-03-25
Payer: COMMERCIAL

## 2025-03-25 VITALS
RESPIRATION RATE: 17 BRPM | TEMPERATURE: 96 F | WEIGHT: 111.56 LBS | BODY MASS INDEX: 18.59 KG/M2 | DIASTOLIC BLOOD PRESSURE: 70 MMHG | HEIGHT: 65 IN | OXYGEN SATURATION: 99 % | HEART RATE: 69 BPM | SYSTOLIC BLOOD PRESSURE: 104 MMHG

## 2025-03-25 DIAGNOSIS — G43.109 MIGRAINE WITH AURA AND WITHOUT STATUS MIGRAINOSUS, NOT INTRACTABLE: ICD-10-CM

## 2025-03-25 DIAGNOSIS — M85.89 OSTEOPENIA OF MULTIPLE SITES: ICD-10-CM

## 2025-03-25 DIAGNOSIS — J30.1 SEASONAL ALLERGIC RHINITIS DUE TO POLLEN: ICD-10-CM

## 2025-03-25 DIAGNOSIS — Z80.8 FH: THYROID CANCER: ICD-10-CM

## 2025-03-25 DIAGNOSIS — N95.9 MENOPAUSAL DISORDER: ICD-10-CM

## 2025-03-25 DIAGNOSIS — R73.03 PRE-DIABETES: ICD-10-CM

## 2025-03-25 DIAGNOSIS — Z80.0 FH: COLON CANCER IN FIRST DEGREE RELATIVE <60 YEARS OLD: ICD-10-CM

## 2025-03-25 DIAGNOSIS — K21.9 GASTROESOPHAGEAL REFLUX DISEASE WITHOUT ESOPHAGITIS: ICD-10-CM

## 2025-03-25 DIAGNOSIS — E78.49 OTHER HYPERLIPIDEMIA: ICD-10-CM

## 2025-03-25 DIAGNOSIS — M70.62 GREATER TROCHANTERIC BURSITIS, LEFT: ICD-10-CM

## 2025-03-25 DIAGNOSIS — Z00.00 WELL ADULT EXAM: Primary | ICD-10-CM

## 2025-03-25 DIAGNOSIS — E55.9 VITAMIN D DEFICIENCY DISEASE: ICD-10-CM

## 2025-03-25 PROCEDURE — 3074F SYST BP LT 130 MM HG: CPT | Mod: CPTII,S$GLB,, | Performed by: PEDIATRICS

## 2025-03-25 PROCEDURE — 1160F RVW MEDS BY RX/DR IN RCRD: CPT | Mod: CPTII,S$GLB,, | Performed by: PEDIATRICS

## 2025-03-25 PROCEDURE — 3078F DIAST BP <80 MM HG: CPT | Mod: CPTII,S$GLB,, | Performed by: PEDIATRICS

## 2025-03-25 PROCEDURE — 99999 PR PBB SHADOW E&M-EST. PATIENT-LVL V: CPT | Mod: PBBFAC,,, | Performed by: PEDIATRICS

## 2025-03-25 PROCEDURE — 1159F MED LIST DOCD IN RCRD: CPT | Mod: CPTII,S$GLB,, | Performed by: PEDIATRICS

## 2025-03-25 PROCEDURE — 99396 PREV VISIT EST AGE 40-64: CPT | Mod: S$GLB,,, | Performed by: PEDIATRICS

## 2025-03-25 PROCEDURE — 3008F BODY MASS INDEX DOCD: CPT | Mod: CPTII,S$GLB,, | Performed by: PEDIATRICS

## 2025-03-25 RX ORDER — ERGOCALCIFEROL 1.25 MG/1
50000 CAPSULE ORAL
Qty: 12 CAPSULE | Refills: 3 | Status: SHIPPED | OUTPATIENT
Start: 2025-03-25

## 2025-03-25 RX ORDER — LORATADINE 10 MG/1
10 TABLET ORAL DAILY
Qty: 30 TABLET | Refills: 11 | Status: SHIPPED | OUTPATIENT
Start: 2025-03-25 | End: 2026-03-25

## 2025-03-25 RX ORDER — OMEPRAZOLE 40 MG/1
40 CAPSULE, DELAYED RELEASE ORAL
Qty: 180 CAPSULE | Refills: 3 | Status: SHIPPED | OUTPATIENT
Start: 2025-03-25

## 2025-03-25 RX ORDER — AMITRIPTYLINE HYDROCHLORIDE 10 MG/1
10 TABLET, FILM COATED ORAL NIGHTLY PRN
Qty: 90 TABLET | Refills: 3 | Status: SHIPPED | OUTPATIENT
Start: 2025-03-25

## 2025-03-25 RX ORDER — FLUTICASONE PROPIONATE 50 MCG
1 SPRAY, SUSPENSION (ML) NASAL DAILY
Qty: 16 G | Refills: 11 | Status: SHIPPED | OUTPATIENT
Start: 2025-03-25

## 2025-03-25 RX ORDER — IBANDRONATE SODIUM 150 MG/1
150 TABLET, FILM COATED ORAL
Qty: 3 TABLET | Refills: 3 | Status: SHIPPED | OUTPATIENT
Start: 2025-03-25

## 2025-03-25 NOTE — PROGRESS NOTES
Subjective:       Patient ID: Alexandra Rivas is a 54 y.o. female.    Chief Complaint: Annual Exam    Alexandra Rivas is a 54 y.o. female who presents today for aNNUAL follow up.    1)Migraine with aura: quiet on elavil only as needed. Improvement after menopause   2)FH: thyroid cancer: regular checking of TSH and US. US was benign discussed with pt.  3)FH: colon cancer: sister Dx with colon cancer at 51yo. Colonoscopy 5/22 que 10 years.  4)Vitamin D: on weekly D  5)Myofascial pain tender lower left shoulder: Saw cards left anterior chest wall pain. Does not worsen with exertion. Cardiac w/u negative, suspect GERD. Left hip painful with lying on left.   6)Gastritis/duodenitis/GERD: despite Prilosec qd, flaring, CP and reflux and bloating with lying down.   7)LIPIDS: following D&E     LABS REVIEWED AND DISCUSSED WITH PATIENT     PMHx, PSHx, SocHx, and FHx reviewed and discussed with patient      Review of Systems   Constitutional:  Negative for fever and unexpected weight change.   HENT:  Positive for congestion, postnasal drip, rhinorrhea, sinus pressure and sneezing.    Eyes:  Negative for discharge and redness.   Respiratory:  Negative for cough and wheezing.    Cardiovascular:  Positive for chest pain. Negative for palpitations and leg swelling.   Gastrointestinal:  Positive for abdominal pain. Negative for constipation, diarrhea and vomiting.   Endocrine: Negative for cold intolerance, heat intolerance, polydipsia, polyphagia and polyuria.   Genitourinary:  Negative for decreased urine volume, difficulty urinating and menstrual problem.   Musculoskeletal:  Positive for arthralgias (left hip). Negative for joint swelling.   Skin:  Negative for rash and wound.   Neurological:  Negative for syncope and headaches.   Psychiatric/Behavioral:  Negative for behavioral problems and sleep disturbance.        Objective:      Physical Exam  Constitutional:       Appearance: Normal appearance. She is  well-developed.   HENT:      Head: Normocephalic and atraumatic.      Nose: Congestion and rhinorrhea present.      Mouth/Throat:      Pharynx: No oropharyngeal exudate.   Eyes:      General: Lids are normal.      Conjunctiva/sclera: Conjunctivae normal.      Pupils: Pupils are equal, round, and reactive to light.   Neck:      Thyroid: No thyroid mass or thyromegaly.      Vascular: No carotid bruit.      Trachea: Trachea normal.      Meningeal: Brudzinski's sign and Kernig's sign absent.   Cardiovascular:      Rate and Rhythm: Normal rate and regular rhythm.      Pulses: Normal pulses.      Heart sounds: Normal heart sounds. No murmur heard.  Pulmonary:      Effort: Pulmonary effort is normal.      Breath sounds: Normal breath sounds. No wheezing, rhonchi or rales.   Abdominal:      Palpations: Abdomen is soft.      Tenderness: There is no abdominal tenderness. There is no rebound.   Musculoskeletal:      Cervical back: Normal range of motion and neck supple.      Comments: Rom hips normal, left greater tronch TTP.   Skin:     General: Skin is warm.      Findings: No abrasion or rash.   Neurological:      Mental Status: She is alert and oriented to person, place, and time.      Cranial Nerves: No cranial nerve deficit.      Sensory: No sensory deficit.      Coordination: Coordination normal.      Gait: Gait normal.      Deep Tendon Reflexes: Reflexes are normal and symmetric.   Psychiatric:         Mood and Affect: Mood is not anxious or depressed.         Speech: Speech normal.         Behavior: Behavior normal. Behavior is cooperative.         Thought Content: Thought content normal.         Judgment: Judgment normal.         Assessment:       1. Well adult exam    2. Pre-diabetes    3. Other hyperlipidemia    4. Migraine with aura and without status migrainosus, not intractable    5. FH: thyroid cancer    6. FH: colon cancer in first degree relative <60 years old    7. Gastroesophageal reflux disease without  esophagitis    8. Osteopenia of multiple sites    9. Vitamin D deficiency disease    10. Menopausal disorder        Plan:       Well adult exam    Pre-diabetes    Other hyperlipidemia    Migraine with aura and without status migrainosus, not intractable  -     amitriptyline (ELAVIL) 10 MG tablet; Take 1 tablet (10 mg total) by mouth nightly as needed for Insomnia.  Dispense: 90 tablet; Refill: 3    FH: thyroid cancer    FH: colon cancer in first degree relative <60 years old    Gastroesophageal reflux disease without esophagitis  -     omeprazole (PRILOSEC) 40 MG capsule; Take 1 capsule (40 mg total) by mouth 2 (two) times daily before meals.  Dispense: 180 capsule; Refill: 3    Osteopenia of multiple sites    Vitamin D deficiency disease    Menopausal disorder  -     amitriptyline (ELAVIL) 10 MG tablet; Take 1 tablet (10 mg total) by mouth nightly as needed for Insomnia.  Dispense: 90 tablet; Refill: 3    Other orders  -     ergocalciferol (ERGOCALCIFEROL) 50,000 unit Cap; Take 1 capsule (50,000 Units total) by mouth every 7 days.  Dispense: 12 capsule; Refill: 3  -     ibandronate (BONIVA) 150 mg tablet; Take 1 tablet (150 mg total) by mouth every 30 days.  Dispense: 3 tablet; Refill: 3    HMI and vaccines D/W pt. PCV 20 now. OTC voltaren or sports cream, ice/heat, cushion left hip, if not better see ortho for injection. Allergy treatment d/w pt. BID PPI, if not better, refer for EGD. Maintian baseline meds. Await thyroid us. F/u 6 months with labs.

## 2025-04-23 ENCOUNTER — RESULTS FOLLOW-UP (OUTPATIENT)
Dept: INTERNAL MEDICINE | Facility: CLINIC | Age: 55
End: 2025-04-23

## 2025-04-23 ENCOUNTER — HOSPITAL ENCOUNTER (OUTPATIENT)
Dept: RADIOLOGY | Facility: HOSPITAL | Age: 55
Discharge: HOME | End: 2025-04-23
Attending: PEDIATRICS
Payer: COMMERCIAL

## 2025-04-23 DIAGNOSIS — E04.1 THYROID CYST: ICD-10-CM

## 2025-04-23 PROCEDURE — 76536 US EXAM OF HEAD AND NECK: CPT | Mod: 26,,, | Performed by: RADIOLOGY

## 2025-04-23 PROCEDURE — 76536 US EXAM OF HEAD AND NECK: CPT | Mod: TC
